# Patient Record
Sex: MALE | Race: WHITE | NOT HISPANIC OR LATINO | Employment: OTHER | URBAN - METROPOLITAN AREA
[De-identification: names, ages, dates, MRNs, and addresses within clinical notes are randomized per-mention and may not be internally consistent; named-entity substitution may affect disease eponyms.]

---

## 2017-07-25 DIAGNOSIS — D72.819 LEUKOPENIA, UNSPECIFIED TYPE: ICD-10-CM

## 2017-07-25 DIAGNOSIS — D50.9 IRON DEFICIENCY ANEMIA, UNSPECIFIED: ICD-10-CM

## 2017-07-25 DIAGNOSIS — D61.818 PANCYTOPENIA: ICD-10-CM

## 2017-07-25 DIAGNOSIS — D69.59 OTHER SECONDARY THROMBOCYTOPENIA: ICD-10-CM

## 2017-07-25 RX ORDER — FERROUS GLUCONATE 324(38)MG
324 TABLET ORAL 2 TIMES DAILY
Qty: 60 TABLET | Refills: 2 | COMMUNITY
Start: 2017-07-25 | End: 2019-09-09 | Stop reason: SDUPTHER

## 2018-04-23 ENCOUNTER — HISTORICAL (OUTPATIENT)
Dept: ADMINISTRATIVE | Facility: HOSPITAL | Age: 58
End: 2018-04-23

## 2018-04-23 ENCOUNTER — OFFICE VISIT (OUTPATIENT)
Dept: HEMATOLOGY/ONCOLOGY | Facility: CLINIC | Age: 58
End: 2018-04-23
Payer: MEDICARE

## 2018-04-23 VITALS
SYSTOLIC BLOOD PRESSURE: 129 MMHG | BODY MASS INDEX: 29.17 KG/M2 | DIASTOLIC BLOOD PRESSURE: 71 MMHG | WEIGHT: 181.5 LBS | HEART RATE: 74 BPM | HEIGHT: 66 IN | TEMPERATURE: 97 F

## 2018-04-23 DIAGNOSIS — D50.9 IRON DEFICIENCY ANEMIA, UNSPECIFIED IRON DEFICIENCY ANEMIA TYPE: Primary | ICD-10-CM

## 2018-04-23 DIAGNOSIS — D61.818 PANCYTOPENIA: ICD-10-CM

## 2018-04-23 DIAGNOSIS — D69.59 OTHER SECONDARY THROMBOCYTOPENIA: ICD-10-CM

## 2018-04-23 LAB
% SATURATION: 13 %
ALBUMIN SERPL-MCNC: 3.7 G/DL (ref 3.1–4.7)
ALP SERPL-CCNC: 85 IU/L (ref 40–104)
ALT (SGPT): 29 IU/L (ref 3–33)
AST SERPL-CCNC: 29 IU/L (ref 10–40)
BASOPHILS NFR BLD: 0.1 K/UL (ref 0–0.2)
BASOPHILS NFR BLD: 0.6 %
BILIRUB SERPL-MCNC: 0.3 MG/DL (ref 0.3–1)
BUN SERPL-MCNC: 15 MG/DL (ref 8–20)
CALCIUM SERPL-MCNC: 9.1 MG/DL (ref 7.7–10.4)
CHLORIDE: 100 MMOL/L (ref 98–110)
CO2 SERPL-SCNC: 24.5 MMOL/L (ref 22.8–31.6)
CREATININE: 1.12 MG/DL (ref 0.6–1.4)
EOSINOPHIL NFR BLD: 0.1 K/UL (ref 0–0.7)
EOSINOPHIL NFR BLD: 0.7 %
ERYTHROCYTE [DISTWIDTH] IN BLOOD BY AUTOMATED COUNT: 13.2 % (ref 12.5–14.5)
FERRITIN SERPL-MCNC: 33 NG/ML (ref 37–201)
GLUCOSE: 182 MG/DL (ref 70–99)
GRAN #: 6.6 K/UL (ref 1.4–6.5)
GRAN%: 78.2 %
HCT VFR BLD AUTO: 38.8 % (ref 39–55)
HGB BLD-MCNC: 13.2 G/DL (ref 14–16)
IMMATURE GRANS (ABS): 0 K/UL (ref 0–1)
IMMATURE GRANULOCYTES: 0.4 %
IRON: 43 MCG/DL (ref 32–176)
LYMPH #: 1.2 K/UL (ref 1.2–3.4)
LYMPH%: 13.7 %
MCH RBC QN AUTO: 29.9 PG (ref 25–35)
MCHC RBC AUTO-ENTMCNC: 34 G/DL (ref 31–36)
MCV RBC AUTO: 88 FL (ref 80–100)
MONO #: 0.5 K/UL (ref 0.1–0.6)
MONO%: 6.4 %
NUCLEATED RBCS: 0 %
NUCLEATED RED BLOOD CELLS: 0 /100 WBC
PERFORMED BY:: ABNORMAL
PLATELET # BLD AUTO: 187 K/UL (ref 140–440)
PMV BLD AUTO: 9.7 FL (ref 8.8–12.7)
POTASSIUM SERPL-SCNC: 4.3 MMOL/L (ref 3.5–5)
PROT SERPL-MCNC: 7 G/DL (ref 6–8.2)
RBC # BLD AUTO: 4.41 M/UL (ref 4.3–5.9)
SODIUM: 134 MMOL/L (ref 134–144)
TOTAL IRON BINDING CAPACITY: 321 MCG/DL (ref 177–435)
WBC # BLD: 8.5 K/UL (ref 5–10)

## 2018-04-23 PROCEDURE — 99213 OFFICE O/P EST LOW 20 MIN: CPT | Mod: ,,, | Performed by: INTERNAL MEDICINE

## 2018-04-23 RX ORDER — INSULIN GLARGINE 300 U/ML
INJECTION, SOLUTION SUBCUTANEOUS
Refills: 5 | COMMUNITY
Start: 2018-02-07 | End: 2019-11-08 | Stop reason: SDUPTHER

## 2018-04-23 RX ORDER — INSULIN DEGLUDEC 100 U/ML
INJECTION, SOLUTION SUBCUTANEOUS
Refills: 2 | COMMUNITY
Start: 2018-03-07 | End: 2019-11-08 | Stop reason: SDUPTHER

## 2018-04-23 RX ORDER — BLOOD SUGAR DIAGNOSTIC
STRIP MISCELLANEOUS
Refills: 5 | COMMUNITY
Start: 2018-03-19 | End: 2020-05-12 | Stop reason: SDUPTHER

## 2018-04-23 RX ORDER — LANCETS 33 GAUGE
EACH MISCELLANEOUS
Refills: 5 | COMMUNITY
Start: 2018-02-02 | End: 2020-01-08 | Stop reason: SDUPTHER

## 2018-04-23 RX ORDER — BLOOD-GLUCOSE METER
EACH MISCELLANEOUS
Refills: 0 | COMMUNITY
Start: 2018-03-31

## 2018-04-23 NOTE — PROGRESS NOTES
"   [facility] Hematology/Oncology  PROGRESS NOTE      Subjective:       Patient ID:   NAME: Matthieu Jovel : 1960     58 y.o. male    Referring Doc: Efren  Other Physicians:    Chief Complaint:  Iron defic anemia f/u    History of Present Illness:     Patient returns today for a regularly scheduled follow-up visit.  The patient is here with his transport person. He is resident of [facility]. He has some genralized aches and pains which I asked him to address with his mPCP; no SOB, HA's or N/V.             ROS:   GEN: normal without any fever, night sweats or weight loss  HEENT: normal with no HA's, sore throat, stiff neck, changes in vision  CV: normal with no CP, SOB, PND, PRADO or orthopnea  PULM: normal with no SOB, cough, hemoptysis, sputum or pleuritic pain  GI: normal with no abdominal pain, nausea, vomiting, constipation, diarrhea, melanotic stools, BRBPR, or hematemesis  : normal with no hematuria, dysuria  BREAST: normal with no mass, discharge, pain  SKIN: normal with no rash, erythema, bruising, or swelling    Allergies:  Review of patient's allergies indicates:   Allergen Reactions    Codeine        Medications:    Current Outpatient Prescriptions:     ascorbic acid (VITAMIN C) 500 MG tablet, Take 500 mg by mouth once daily., Disp: , Rfl:     BD INSULIN PEN NEEDLE UF MINI 31 gauge x 3/16" Ndle, USE TWICE DAILY AS DIRECTED, Disp: , Rfl: 4    benztropine (COGENTIN) 2 MG Tab, Take 1 mg by mouth 2 (two) times daily., Disp: , Rfl:     bisacodyl (GENTLE LAXATIVE) 5 mg EC tablet, Take 5 mg by mouth daily as needed., Disp: , Rfl:     CONTOUR METER Misc, UTD, Disp: , Rfl: 0    CONTOUR TEST STRIPS Strp, TEST QID PRN, Disp: , Rfl: 5    ferrous gluconate (FERGON) 324 MG tablet, Take 1 tablet (324 mg total) by mouth 2 (two) times daily., Disp: 60 tablet, Rfl: 2    fluoxetine (PROZAC) 10 MG Tab, Take 10 mg by mouth once daily., Disp: , Rfl:     gabapentin (NEURONTIN) 300 MG capsule, TK ONE C PO  " "BID, Disp: , Rfl: 1    hydrOXYzine (VISTARIL) 100 MG capsule, TK ONE C PO  QHS, Disp: , Rfl: 0    metformin (GLUCOPHAGE) 1000 MG tablet, Take 1,000 mg by mouth 2 (two) times daily with meals., Disp: , Rfl:     omeprazole (PRILOSEC) 20 MG capsule, TK ONE C PO  QD, Disp: , Rfl: 3    oxcarbazepine (TRILEPTAL) 300 MG Tab, TK 1 T PO  QD, Disp: , Rfl: 1    potassium chloride (KLOR-CON) 20 mEq Pack, Take 20 mEq by mouth once., Disp: , Rfl:     pravastatin (PRAVACHOL) 40 MG tablet, TK 1 T PO  QD, Disp: , Rfl: 5    quetiapine (SEROQUEL) 300 MG Tab, Take by mouth., Disp: , Rfl:     TOUJEO SOLOSTAR U-300 INSULIN 300 unit/mL (1.5 mL) InPn pen, INJECT 30 UNITS SQ D, Disp: , Rfl: 5    TRESIBA FLEXTOUCH U-100 100 unit/mL (3 mL) InPn, INJECT 30 UNITS UNDER THE SKIN ONCE DAILY, Disp: , Rfl: 2    TRUEPLUS LANCETS 33 gauge Misc, USE AS DIRECTED QID, Disp: , Rfl: 5    divalproex (DEPAKOTE) 500 MG Tb24, Take 500 mg by mouth once daily., Disp: , Rfl:     LATUDA 80 mg Tab tablet, TK 1 T PO BID, Disp: , Rfl: 1    PMHx/PSHx Updates:  See patient's last visit with me on 9/28/2016.  See H&P on 8/26/2013        Pathology:  none          Objective:     Vitals:  Blood pressure 129/71, pulse 74, temperature 97.4 °F (36.3 °C), height 5' 6" (1.676 m), weight 82.3 kg (181 lb 8 oz).    Physical Examination:   GEN: no apparent distress, comfortable; AAOx3  HEAD: atraumatic and normocephalic  EYES: no pallor, no icterus, PERRLA  ENT: OMM, no pharyngeal erythema, external ears WNL; no nasal discharge; no thrush  NECK: no masses, thyroid normal, trachea midline, no LAD/LN's, supple  CV: RRR with no murmur; normal pulse; normal S1 and S2; no pedal edema  CHEST: Normal respiratory effort; CTAB; normal breath sounds; no wheeze or crackles  ABDOM: nontender and nondistended; soft; normal bowel sounds; no rebound/guarding  MUSC/Skeletal: ROM normal; no crepitus; joints normal; no deformities or arthropathy  EXTREM: no clubbing, cyanosis, " inflammation or swelling  SKIN: no rashes, lesions, ulcers, petechiae or subcutaneous nodules  : no patel  NEURO: grossly intact; motor/sensory WNL; AAOx3; no tremors  PSYCH: normal mood, affect and behavior  LYMPH: normal cervical, supraclavicular, axillary and groin LN's            Labs:     Done with Dr Schwartz      Radiology/Diagnostic Studies:    No results found.    I have reviewed all available lab results and radiology reports.    Assessment/Plan:   (1) 58 y.o. male with diagnosis of psychiatric and mental issues who is a resident of [facility]      (2) mild chronic pancytopenia suspected secondary to antipsychotic meds  - he has not had any recent labs done    (3) iron defic anemia in past    (4) Noncompliance with follow-up        PLAN:  1. Check up to date labs incl. Iron panel  2. F/u with PCP  3. encouraged compliance  4.  RTC in 6 months  Fax note to Peter Schwartz MD,         Discussion:     I have explained all of the above in detail and the patient understands all of the current recommendation(s). I have answered all of their questions to the best of my ability and to their complete satisfaction.   The patient is to continue with the current management plan.            Electronically signed by Wing Arechiga MD

## 2018-04-23 NOTE — LETTER
April 23, 2018      Peter Schwartz MD  [address]  [address]  [place]  [place] [address]           [facility] Hematology Oncology  [address]  [address]  [place] [address]  Phone: [phone]  Fax: [phone]          Patient: Matthieu Jovel   MR Number: [id number]   YOB: 1960   Date of Visit: 4/23/2018       Dear Dr. Peter Schwartz:    Thank you for referring Matthieu Jovel to me for evaluation. Attached you will find relevant portions of my assessment and plan of care.    If you have questions, please do not hesitate to call me. I look forward to following Matthieu Jovel along with you.    Sincerely,    Wing Arechiga MD    Enclosure  CC:  No Recipients    If you would like to receive this communication electronically, please contact [email or web address] or [phone] to request more information on [employer] Link access.    For providers and/or their staff who would like to refer a patient to [facility], please contact us through our one-stop-shop provider referral line, [facility], at [phone].    If you feel you have received this communication in error or would no longer like to receive these types of communications, please e-mail [email or web address]

## 2018-09-24 LAB — HBA1C MFR BLD: 8.8 %

## 2018-10-18 ENCOUNTER — TELEPHONE (OUTPATIENT)
Dept: HEMATOLOGY/ONCOLOGY | Facility: CLINIC | Age: 58
End: 2018-10-18

## 2018-10-18 NOTE — TELEPHONE ENCOUNTER
Called to remind the pt to have the lab work done prior to the upcoming appt.    Pt ask that I call back tomorrow to remind his  Mireille.    Assured the pt that I will call back tomorrow

## 2018-10-19 NOTE — TELEPHONE ENCOUNTER
Called to speak w/ Mireille as the pt requested, the  said she wasn't in. I let the home know that the pt has a appt this Monday and we would like the pt to have his bloodwork done prior to the appt.  She will relay the message and try and have the labs done.

## 2018-10-22 ENCOUNTER — OFFICE VISIT (OUTPATIENT)
Dept: HEMATOLOGY/ONCOLOGY | Facility: CLINIC | Age: 58
End: 2018-10-22
Payer: MEDICARE

## 2018-10-22 VITALS
DIASTOLIC BLOOD PRESSURE: 70 MMHG | WEIGHT: 183.13 LBS | HEART RATE: 79 BPM | SYSTOLIC BLOOD PRESSURE: 143 MMHG | BODY MASS INDEX: 29.55 KG/M2 | TEMPERATURE: 98 F | RESPIRATION RATE: 20 BRPM

## 2018-10-22 DIAGNOSIS — D72.819 LEUKOPENIA, UNSPECIFIED TYPE: ICD-10-CM

## 2018-10-22 DIAGNOSIS — D69.59 OTHER SECONDARY THROMBOCYTOPENIA: Primary | ICD-10-CM

## 2018-10-22 DIAGNOSIS — D61.818 PANCYTOPENIA: ICD-10-CM

## 2018-10-22 DIAGNOSIS — D50.9 IRON DEFICIENCY ANEMIA, UNSPECIFIED IRON DEFICIENCY ANEMIA TYPE: ICD-10-CM

## 2018-10-22 LAB
% SATURATION: 8 %
ALBUMIN SERPL-MCNC: 3.7 G/DL (ref 3.1–4.7)
ALP SERPL-CCNC: 117 IU/L (ref 40–104)
ALT (SGPT): 18 IU/L (ref 3–33)
AST SERPL-CCNC: 17 IU/L (ref 10–40)
BASOPHILS NFR BLD: 0 K/UL (ref 0–0.2)
BASOPHILS NFR BLD: 0.6 %
BILIRUB SERPL-MCNC: 0.6 MG/DL (ref 0.3–1)
BUN SERPL-MCNC: 15 MG/DL (ref 8–20)
CALCIUM SERPL-MCNC: 9.1 MG/DL (ref 7.7–10.4)
CHLORIDE: 105 MMOL/L (ref 98–110)
CO2 SERPL-SCNC: 22.5 MMOL/L (ref 22.8–31.6)
CREATININE: 1.13 MG/DL (ref 0.6–1.4)
EOSINOPHIL NFR BLD: 0.1 K/UL (ref 0–0.7)
EOSINOPHIL NFR BLD: 1.2 %
ERYTHROCYTE [DISTWIDTH] IN BLOOD BY AUTOMATED COUNT: 13.1 % (ref 12.5–14.5)
FERRITIN SERPL-MCNC: 65 NG/ML (ref 37–201)
GLUCOSE: 356 MG/DL (ref 70–99)
GRAN #: 5.3 K/UL (ref 1.4–6.5)
GRAN%: 79.7 %
HCT VFR BLD AUTO: 39.9 % (ref 39–55)
HGB BLD-MCNC: 13.2 G/DL (ref 14–16)
IMMATURE GRANS (ABS): 0 K/UL (ref 0–1)
IMMATURE GRANULOCYTES: 0.5 %
IRON: 25 MCG/DL (ref 32–176)
LYMPH #: 0.8 K/UL (ref 1.2–3.4)
LYMPH%: 12.4 %
MCH RBC QN AUTO: 29.7 PG (ref 25–35)
MCHC RBC AUTO-ENTMCNC: 33.1 G/DL (ref 31–36)
MCV RBC AUTO: 89.9 FL (ref 80–100)
MONO #: 0.4 K/UL (ref 0.1–0.6)
MONO%: 5.6 %
NUCLEATED RBCS: 0 %
NUCLEATED RED BLOOD CELLS: 0 /100 WBC
PERFORMED BY:: ABNORMAL
PLATELET # BLD AUTO: 120 K/UL (ref 140–440)
PMV BLD AUTO: 11 FL (ref 8.8–12.7)
POTASSIUM SERPL-SCNC: 4.5 MMOL/L (ref 3.5–5)
PROT SERPL-MCNC: 7 G/DL (ref 6–8.2)
RBC # BLD AUTO: 4.44 M/UL (ref 4.3–5.9)
SODIUM: 137 MMOL/L (ref 134–144)
TOTAL IRON BINDING CAPACITY: 296 MCG/DL (ref 177–435)
WBC # BLD: 6.6 K/UL (ref 5–10)

## 2018-10-22 PROCEDURE — 99213 OFFICE O/P EST LOW 20 MIN: CPT | Mod: ,,, | Performed by: INTERNAL MEDICINE

## 2018-10-22 NOTE — PROGRESS NOTES
"   [facility] Hematology/Oncology  PROGRESS NOTE      Subjective:       Patient ID:   NAME: Matthieu Jovel : 1960     58 y.o. male    Referring Doc: Efren  Other Physicians:    Chief Complaint:  Iron defic anemia f/u    History of Present Illness:     Patient returns today for a regularly scheduled follow-up visit.  The patient is here with his transport person. He is resident of [facility]. He went to ER at [facility] recently because his catheter bag was clogged; otherwise he denies any new issues; no SOB, HA's or N/V.             ROS:   GEN: normal without any fever, night sweats or weight loss  HEENT: normal with no HA's, sore throat, stiff neck, changes in vision  CV: normal with no CP, SOB, PND, PRADO or orthopnea  PULM: normal with no SOB, cough, hemoptysis, sputum or pleuritic pain  GI: normal with no abdominal pain, nausea, vomiting, constipation, diarrhea, melanotic stools, BRBPR, or hematemesis  : normal with no hematuria, dysuria  BREAST: normal with no mass, discharge, pain  SKIN: normal with no rash, erythema, bruising, or swelling    Allergies:  Review of patient's allergies indicates:   Allergen Reactions    Codeine        Medications:    Current Outpatient Medications:     ascorbic acid (VITAMIN C) 500 MG tablet, Take 500 mg by mouth once daily., Disp: , Rfl:     BD INSULIN PEN NEEDLE UF MINI 31 gauge x 3/16" Ndle, USE TWICE DAILY AS DIRECTED, Disp: , Rfl: 4    benztropine (COGENTIN) 2 MG Tab, Take 1 mg by mouth 2 (two) times daily., Disp: , Rfl:     bisacodyl (GENTLE LAXATIVE) 5 mg EC tablet, Take 5 mg by mouth daily as needed., Disp: , Rfl:     CONTOUR METER Misc, UTD, Disp: , Rfl: 0    CONTOUR TEST STRIPS Strp, TEST QID PRN, Disp: , Rfl: 5    divalproex (DEPAKOTE) 500 MG Tb24, Take 500 mg by mouth once daily., Disp: , Rfl:     ferrous gluconate (FERGON) 324 MG tablet, Take 1 tablet (324 mg total) by mouth 2 (two) times daily., Disp: 60 tablet, Rfl: 2    fluoxetine (PROZAC) 10 MG " Tab, Take 10 mg by mouth once daily., Disp: , Rfl:     gabapentin (NEURONTIN) 300 MG capsule, TK ONE C PO  BID, Disp: , Rfl: 1    hydrOXYzine (VISTARIL) 100 MG capsule, TK ONE C PO  QHS, Disp: , Rfl: 0    metformin (GLUCOPHAGE) 1000 MG tablet, Take 1,000 mg by mouth 2 (two) times daily with meals., Disp: , Rfl:     omeprazole (PRILOSEC) 20 MG capsule, TK ONE C PO  QD, Disp: , Rfl: 3    oxcarbazepine (TRILEPTAL) 300 MG Tab, TK 1 T PO  QD, Disp: , Rfl: 1    potassium chloride (KLOR-CON) 20 mEq Pack, Take 20 mEq by mouth once., Disp: , Rfl:     pravastatin (PRAVACHOL) 40 MG tablet, TK 1 T PO  QD, Disp: , Rfl: 5    quetiapine (SEROQUEL) 300 MG Tab, Take by mouth., Disp: , Rfl:     TOUJEO SOLOSTAR U-300 INSULIN 300 unit/mL (1.5 mL) InPn pen, INJECT 30 UNITS SQ D, Disp: , Rfl: 5    TRESIBA FLEXTOUCH U-100 100 unit/mL (3 mL) InPn, INJECT 30 UNITS UNDER THE SKIN ONCE DAILY, Disp: , Rfl: 2    TRUEPLUS LANCETS 33 gauge Misc, USE AS DIRECTED QID, Disp: , Rfl: 5    LATUDA 80 mg Tab tablet, TK 1 T PO BID, Disp: , Rfl: 1    PMHx/PSHx Updates:  See patient's last visit with me on 4/23/2018  See H&P on 8/26/2013        Pathology:  none          Objective:     Vitals:  Blood pressure (!) 143/70, pulse 79, temperature 98 °F (36.7 °C), resp. rate 20, weight 83.1 kg (183 lb 1.6 oz).    Physical Examination:   GEN: no apparent distress, comfortable; AAOx3  HEAD: atraumatic and normocephalic  EYES: no pallor, no icterus, PERRLA  ENT: OMM, no pharyngeal erythema, external ears WNL; no nasal discharge; no thrush  NECK: no masses, thyroid normal, trachea midline, no LAD/LN's, supple  CV: RRR with no murmur; normal pulse; normal S1 and S2; no pedal edema  CHEST: Normal respiratory effort; CTAB; normal breath sounds; no wheeze or crackles  ABDOM: nontender and nondistended; soft; normal bowel sounds; no rebound/guarding  MUSC/Skeletal: ROM normal; no crepitus; joints normal; no deformities or arthropathy  EXTREM: no clubbing,  cyanosis, inflammation or swelling  SKIN: no rashes, lesions, ulcers, petechiae or subcutaneous nodules  : no patel  NEURO: grossly intact; motor/sensory WNL; AAOx3; no tremors  PSYCH: normal mood, affect and behavior  LYMPH: normal cervical, supraclavicular, axillary and groin LN's            Labs:     10/22/2018 pending      Radiology/Diagnostic Studies:    No results found.    I have reviewed all available lab results and radiology reports.    Assessment/Plan:   (1) 58 y.o. male with diagnosis of psychiatric and mental issues who is a resident of [facility]      (2) mild chronic pancytopenia suspected secondary to antipsychotic meds  - he has not had any recent labs done until today and they are pending    (3) iron defic anemia in past    (4) Noncompliance with follow-up and labs    1. Other secondary thrombocytopenia  CBC auto differential    Comprehensive metabolic panel    Iron and TIBC    Ferritin    CBC auto differential    Comprehensive metabolic panel    Iron and TIBC    Ferritin   2. Pancytopenia  CBC auto differential    Comprehensive metabolic panel    Iron and TIBC    Ferritin    CBC auto differential    Comprehensive metabolic panel    Iron and TIBC    Ferritin   3. Iron deficiency anemia, unspecified iron deficiency anemia type  CBC auto differential    Comprehensive metabolic panel    Iron and TIBC    Ferritin    CBC auto differential    Comprehensive metabolic panel    Iron and TIBC    Ferritin   4. Leukopenia, unspecified type  CBC auto differential    Comprehensive metabolic panel    Iron and TIBC    Ferritin    CBC auto differential    Comprehensive metabolic panel    Iron and TIBC    Ferritin         PLAN:  1. Check up to date labs incl. Iron panel that were drawn today  2. F/u with PCP  3. encouraged compliance  4.  RTC in 6 months  Fax note to Efren        Discussion:     I have explained all of the above in detail and the patient understands all of the current recommendation(s). I  have answered all of their questions to the best of my ability and to their complete satisfaction.   The patient is to continue with the current management plan.            Electronically signed by Wing Arechiga MD

## 2018-10-22 NOTE — LETTER
October 22, 2018      Peter Schwartz MD  [address]  [address]  [place]  [place] [address]           [facility] - Hematology Oncology  [address]  [address]  [place] [address]  Phone: [phone]  Fax: [phone]          Patient: Matthieu Jovel   MR Number: [id number]   YOB: 1960   Date of Visit: 10/22/2018       Dear Dr. Peter Schwartz:    Thank you for referring Matthieu Jovel to me for evaluation. Attached you will find relevant portions of my assessment and plan of care.    If you have questions, please do not hesitate to call me. I look forward to following Matthieu Jovel along with you.    Sincerely,    Wing Arechiga MD    Enclosure  CC:  No Recipients    If you would like to receive this communication electronically, please contact [email or web address] or [phone] to request more information on [employer] access.    For providers and/or their staff who would like to refer a patient to [facility], please contact us through our one-stop-shop provider referral line, [facility], at [phone].    If you feel you have received this communication in error or would no longer like to receive these types of communications, please e-mail [email or web address]

## 2019-04-17 ENCOUNTER — TELEPHONE (OUTPATIENT)
Dept: HEMATOLOGY/ONCOLOGY | Facility: CLINIC | Age: 59
End: 2019-04-17

## 2019-04-17 DIAGNOSIS — D72.819 LEUKOPENIA, UNSPECIFIED TYPE: ICD-10-CM

## 2019-04-17 DIAGNOSIS — D50.9 IRON DEFICIENCY ANEMIA, UNSPECIFIED IRON DEFICIENCY ANEMIA TYPE: Primary | ICD-10-CM

## 2019-04-17 DIAGNOSIS — D61.818 PANCYTOPENIA: ICD-10-CM

## 2019-04-17 DIAGNOSIS — D69.59 OTHER SECONDARY THROMBOCYTOPENIA: ICD-10-CM

## 2019-04-17 NOTE — TELEPHONE ENCOUNTER
Called to see if the pt had any labs done prior to f/u appt.    Spoke w/ Mireille @ the Group home and let her know about the standing orders to be done @ [facility].  She will have the labs done @ [facility] in [place].

## 2019-04-20 LAB
ALBUMIN SERPL-MCNC: 4.6 G/DL (ref 3.6–5.1)
ALBUMIN/GLOB SERPL: 1.6 (CALC) (ref 1–2.5)
ALP SERPL-CCNC: 114 U/L (ref 40–115)
ALT SERPL-CCNC: 14 U/L (ref 9–46)
AST SERPL-CCNC: 16 U/L (ref 10–35)
BASOPHILS # BLD AUTO: 52 CELLS/UL (ref 0–200)
BASOPHILS NFR BLD AUTO: 0.9 %
BILIRUB SERPL-MCNC: 0.5 MG/DL (ref 0.2–1.2)
BUN SERPL-MCNC: 19 MG/DL (ref 7–25)
BUN/CREAT SERPL: ABNORMAL (CALC) (ref 6–22)
CALCIUM SERPL-MCNC: 9.4 MG/DL (ref 8.6–10.3)
CHLORIDE SERPL-SCNC: 99 MMOL/L (ref 98–110)
CO2 SERPL-SCNC: 29 MMOL/L (ref 20–32)
CREAT SERPL-MCNC: 1.25 MG/DL (ref 0.7–1.33)
EOSINOPHIL # BLD AUTO: 99 CELLS/UL (ref 15–500)
EOSINOPHIL NFR BLD AUTO: 1.7 %
ERYTHROCYTE [DISTWIDTH] IN BLOOD BY AUTOMATED COUNT: 14.3 % (ref 11–15)
FERRITIN SERPL-MCNC: 43 NG/ML (ref 20–380)
GFRSERPLBLD MDRD-ARVRAT: 63 ML/MIN/1.73M2
GLOBULIN SER CALC-MCNC: 2.9 G/DL (CALC) (ref 1.9–3.7)
GLUCOSE SERPL-MCNC: 130 MG/DL (ref 65–99)
HCT VFR BLD AUTO: 41.4 % (ref 38.5–50)
HGB BLD-MCNC: 14.1 G/DL (ref 13.2–17.1)
IRON SATN MFR SERPL: 18 % (CALC) (ref 15–60)
IRON SERPL-MCNC: 64 MCG/DL (ref 50–180)
LYMPHOCYTES # BLD AUTO: 1015 CELLS/UL (ref 850–3900)
LYMPHOCYTES NFR BLD AUTO: 17.5 %
MCH RBC QN AUTO: 29.9 PG (ref 27–33)
MCHC RBC AUTO-ENTMCNC: 34.1 G/DL (ref 32–36)
MCV RBC AUTO: 87.7 FL (ref 80–100)
MONOCYTES # BLD AUTO: 360 CELLS/UL (ref 200–950)
MONOCYTES NFR BLD AUTO: 6.2 %
NEUTROPHILS # BLD AUTO: 4275 CELLS/UL (ref 1500–7800)
NEUTROPHILS NFR BLD AUTO: 73.7 %
PLATELET # BLD AUTO: 203 THOUSAND/UL (ref 140–400)
PMV BLD REES-ECKER: 10.6 FL (ref 7.5–12.5)
POTASSIUM SERPL-SCNC: 4.4 MMOL/L (ref 3.5–5.3)
PROT SERPL-MCNC: 7.5 G/DL (ref 6.1–8.1)
RBC # BLD AUTO: 4.72 MILLION/UL (ref 4.2–5.8)
SODIUM SERPL-SCNC: 135 MMOL/L (ref 135–146)
TIBC SERPL-MCNC: 352 MCG/DL (CALC) (ref 250–425)
WBC # BLD AUTO: 5.8 THOUSAND/UL (ref 3.8–10.8)

## 2019-04-21 NOTE — PROGRESS NOTES
"   [facility] Hematology/Oncology  PROGRESS NOTE      Subjective:       Patient ID:   NAME: Matthieu Jovel : 1960     59 y.o. male    Referring Doc: Efren  Other Physicians:    Chief Complaint:  Iron defic anemia f/u    History of Present Illness:     Patient returns today for a regularly scheduled follow-up visit.  The patient is here with his transport person. He is resident of [facility]. He denies any new issues; no SOB, HA's or N/V. He has been busy with the holidays.             ROS:   GEN: normal without any fever, night sweats or weight loss  HEENT: normal with no HA's, sore throat, stiff neck, changes in vision  CV: normal with no CP, SOB, PND, PRADO or orthopnea  PULM: normal with no SOB, cough, hemoptysis, sputum or pleuritic pain  GI: normal with no abdominal pain, nausea, vomiting, constipation, diarrhea, melanotic stools, BRBPR, or hematemesis  : normal with no hematuria, dysuria  BREAST: normal with no mass, discharge, pain  SKIN: normal with no rash, erythema, bruising, or swelling    Allergies:  Review of patient's allergies indicates:   Allergen Reactions    Codeine        Medications:    Current Outpatient Medications:     ascorbic acid (VITAMIN C) 500 MG tablet, Take 500 mg by mouth once daily., Disp: , Rfl:     BD INSULIN PEN NEEDLE UF MINI 31 gauge x 3/16" Ndle, USE TWICE DAILY AS DIRECTED, Disp: , Rfl: 4    benztropine (COGENTIN) 2 MG Tab, Take 1 mg by mouth 2 (two) times daily., Disp: , Rfl:     bisacodyl (GENTLE LAXATIVE) 5 mg EC tablet, Take 5 mg by mouth daily as needed., Disp: , Rfl:     CONTOUR METER Misc, UTD, Disp: , Rfl: 0    CONTOUR TEST STRIPS Strp, TEST QID PRN, Disp: , Rfl: 5    divalproex (DEPAKOTE) 500 MG Tb24, Take 500 mg by mouth once daily., Disp: , Rfl:     ferrous gluconate (FERGON) 324 MG tablet, Take 1 tablet (324 mg total) by mouth 2 (two) times daily., Disp: 60 tablet, Rfl: 2    fluoxetine (PROZAC) 10 MG Tab, Take 10 mg by mouth once daily., Disp: " , Rfl:     gabapentin (NEURONTIN) 300 MG capsule, TK ONE C PO  BID, Disp: , Rfl: 1    hydrOXYzine (VISTARIL) 100 MG capsule, TK ONE C PO  QHS, Disp: , Rfl: 0    ibuprofen (ADVIL,MOTRIN) 600 MG tablet, Take 1 tablet (600 mg total) by mouth every 6 (six) hours as needed for Pain., Disp: 20 tablet, Rfl: 0    LATUDA 80 mg Tab tablet, TK 1 T PO BID, Disp: , Rfl: 1    metformin (GLUCOPHAGE) 1000 MG tablet, Take 1,000 mg by mouth 2 (two) times daily with meals., Disp: , Rfl:     OLANZapine (ZYPREXA) 10 MG tablet, Take 10 mg by mouth every evening., Disp: , Rfl:     omeprazole (PRILOSEC) 20 MG capsule, TK ONE C PO  QD, Disp: , Rfl: 3    oxcarbazepine (TRILEPTAL) 300 MG Tab, TK 1 T PO  QD, Disp: , Rfl: 1    potassium chloride (KLOR-CON) 20 mEq Pack, Take 20 mEq by mouth once., Disp: , Rfl:     pravastatin (PRAVACHOL) 40 MG tablet, TK 1 T PO  QD, Disp: , Rfl: 5    quetiapine (SEROQUEL) 300 MG Tab, Take by mouth., Disp: , Rfl:     TOUJEO SOLOSTAR U-300 INSULIN 300 unit/mL (1.5 mL) InPn pen, INJECT 30 UNITS SQ D, Disp: , Rfl: 5    TRESIBA FLEXTOUCH U-100 100 unit/mL (3 mL) InPn, INJECT 30 UNITS UNDER THE SKIN ONCE DAILY, Disp: , Rfl: 2    TRUEPLUS LANCETS 33 gauge Misc, USE AS DIRECTED QID, Disp: , Rfl: 5    PMHx/PSHx Updates:  See patient's last visit with me on 10/22/2018  See H&P on 8/26/2013        Pathology:  none          Objective:     Vitals:  Blood pressure 127/77, pulse 78, temperature 98 °F (36.7 °C), temperature source Oral, resp. rate 20, weight 85.4 kg (188 lb 3.2 oz).    Physical Examination:   GEN: no apparent distress, comfortable; AAOx3  HEAD: atraumatic and normocephalic  EYES: no pallor, no icterus, PERRLA  ENT: OMM, no pharyngeal erythema, external ears WNL; no nasal discharge; no thrush  NECK: no masses, thyroid normal, trachea midline, no LAD/LN's, supple  CV: RRR with no murmur; normal pulse; normal S1 and S2; no pedal edema  CHEST: Normal respiratory effort; CTAB; normal breath sounds; no  wheeze or crackles  ABDOM: nontender and nondistended; soft; normal bowel sounds; no rebound/guarding  MUSC/Skeletal: ROM normal; no crepitus; joints normal; no deformities or arthropathy  EXTREM: no clubbing, cyanosis, inflammation or swelling  SKIN: no rashes, lesions, ulcers, petechiae or subcutaneous nodules  : no patel  NEURO: grossly intact; motor/sensory WNL; AAOx3; no tremors  PSYCH: normal mood, affect and behavior  LYMPH: normal cervical, supraclavicular, axillary and groin LN's            Labs:     4/19/2019  Lab Results   Component Value Date    WBC 5.8 04/19/2019    HGB 14.1 04/19/2019    HCT 41.4 04/19/2019    MCV 87.7 04/19/2019     04/19/2019     CMP  Sodium   Date Value Ref Range Status   04/19/2019 135 135 - 146 mmol/L Final   10/22/2018 137 134 - 144 mmol/L      Potassium   Date Value Ref Range Status   04/19/2019 4.4 3.5 - 5.3 mmol/L Final     Chloride   Date Value Ref Range Status   04/19/2019 99 98 - 110 mmol/L Final   10/22/2018 105 98 - 110 mmol/L      CO2   Date Value Ref Range Status   04/19/2019 29 20 - 32 mmol/L Final     Glucose   Date Value Ref Range Status   04/19/2019 130 (H) 65 - 99 mg/dL Final     Comment:                   Fasting reference interval     For someone without known diabetes, a glucose  value >125 mg/dL indicates that they may have  diabetes and this should be confirmed with a  follow-up test.        10/22/2018 356 (H) 70 - 99 mg/dL      BUN, Bld   Date Value Ref Range Status   04/19/2019 19 7 - 25 mg/dL Final     Creatinine   Date Value Ref Range Status   04/19/2019 1.25 0.70 - 1.33 mg/dL Final     Comment:     For patients >49 years of age, the reference limit  for Creatinine is approximately 13% higher for people  identified as -American.        10/22/2018 1.13 0.60 - 1.40 mg/dL      Calcium   Date Value Ref Range Status   04/19/2019 9.4 8.6 - 10.3 mg/dL Final     Total Protein   Date Value Ref Range Status   04/19/2019 7.5 6.1 - 8.1 g/dL Final      Albumin   Date Value Ref Range Status   04/19/2019 4.6 3.6 - 5.1 g/dL Final   10/22/2018 3.7 3.1 - 4.7 g/dL      Total Bilirubin   Date Value Ref Range Status   04/19/2019 0.5 0.2 - 1.2 mg/dL Final     Alkaline Phosphatase   Date Value Ref Range Status   04/19/2019 114 40 - 115 U/L Final     AST   Date Value Ref Range Status   04/19/2019 16 10 - 35 U/L Final     ALT   Date Value Ref Range Status   04/19/2019 14 9 - 46 U/L Final     eGFR if    Date Value Ref Range Status   04/19/2019 73 > OR = 60 mL/min/1.73m2 Final     eGFR if non    Date Value Ref Range Status   04/19/2019 63 > OR = 60 mL/min/1.73m2 Final     Lab Results   Component Value Date    IRON 64 04/19/2019    TIBC 352 04/19/2019    FERRITIN 43 04/19/2019           Radiology/Diagnostic Studies:    No results found.    I have reviewed all available lab results and radiology reports.    Assessment/Plan:   (1) 59 y.o. male with diagnosis of psychiatric and mental issues who is a resident of [facility]    (2) Mild chronic pancytopenia suspected secondary to antipsychotic meds  - he had some recent labs done and CBC is adequate  - hgb is WNL, WBC is WNL  - iron panel is adequate    (3) iron defic anemia in past    (4) Noncompliance with follow-up and labs    1. Other secondary thrombocytopenia  CBC auto differential    Comprehensive metabolic panel    Iron and TIBC    Ferritin   2. Pancytopenia  CBC auto differential    Comprehensive metabolic panel    Iron and TIBC    Ferritin   3. Iron deficiency anemia, unspecified iron deficiency anemia type  CBC auto differential    Comprehensive metabolic panel    Iron and TIBC    Ferritin   4. Leukopenia, unspecified type  CBC auto differential    Comprehensive metabolic panel    Iron and TIBC    Ferritin         PLAN:  1. Check up to date labs incl. Iron panel every 6 months  2. F/u with PCP  3. encouraged compliance  4.  RTC in 6 months  Fax note to Efren        Discussion:      I have explained all of the above in detail and the patient understands all of the current recommendation(s). I have answered all of their questions to the best of my ability and to their complete satisfaction.   The patient is to continue with the current management plan.            Electronically signed by Wing Arechiga MD

## 2019-04-22 ENCOUNTER — OFFICE VISIT (OUTPATIENT)
Dept: HEMATOLOGY/ONCOLOGY | Facility: CLINIC | Age: 59
End: 2019-04-22
Payer: MEDICARE

## 2019-04-22 VITALS
TEMPERATURE: 98 F | RESPIRATION RATE: 20 BRPM | SYSTOLIC BLOOD PRESSURE: 127 MMHG | DIASTOLIC BLOOD PRESSURE: 77 MMHG | HEART RATE: 78 BPM | BODY MASS INDEX: 30.38 KG/M2 | WEIGHT: 188.19 LBS

## 2019-04-22 DIAGNOSIS — D72.819 LEUKOPENIA, UNSPECIFIED TYPE: ICD-10-CM

## 2019-04-22 DIAGNOSIS — D61.818 PANCYTOPENIA: ICD-10-CM

## 2019-04-22 DIAGNOSIS — D69.59 OTHER SECONDARY THROMBOCYTOPENIA: Primary | ICD-10-CM

## 2019-04-22 DIAGNOSIS — D50.9 IRON DEFICIENCY ANEMIA, UNSPECIFIED IRON DEFICIENCY ANEMIA TYPE: ICD-10-CM

## 2019-04-22 PROCEDURE — 99213 OFFICE O/P EST LOW 20 MIN: CPT | Mod: ,,, | Performed by: INTERNAL MEDICINE

## 2019-04-22 PROCEDURE — 99213 PR OFFICE/OUTPT VISIT, EST, LEVL III, 20-29 MIN: ICD-10-PCS | Mod: ,,, | Performed by: INTERNAL MEDICINE

## 2019-04-22 RX ORDER — OLANZAPINE 10 MG/1
10 TABLET ORAL NIGHTLY
COMMUNITY
End: 2020-02-18

## 2019-04-22 NOTE — LETTER
April 22, 2019      Peter Schwartz MD  [address]  [address]  [facility]  [place] [address]           [facility] - Hematology Oncology  [address] [facility]  [address]  [place] [address]  Phone: [phone]  Fax: [phone]          Patient: Matthieu Jovel   MR Number: [id number]   YOB: 1960   Date of Visit: 4/22/2019       Dear Dr. Peter Schwartz:    Thank you for referring Matthieu Jovel to me for evaluation. Attached you will find relevant portions of my assessment and plan of care.    If you have questions, please do not hesitate to call me. I look forward to following Matthieu Jovel along with you.    Sincerely,    Wing Arechiga MD    Enclosure  CC:  No Recipients    If you would like to receive this communication electronically, please contact [email or web address] or [phone] to request more information on [employer] access.    For providers and/or their staff who would like to refer a patient to [facility], please contact us through our one-stop-shop provider referral line, [facility], at [phone].    If you feel you have received this communication in error or would no longer like to receive these types of communications, please e-mail [email or web address]

## 2019-09-09 DIAGNOSIS — D69.59 OTHER SECONDARY THROMBOCYTOPENIA: ICD-10-CM

## 2019-09-09 DIAGNOSIS — D50.9 IRON DEFICIENCY ANEMIA, UNSPECIFIED: ICD-10-CM

## 2019-09-09 DIAGNOSIS — D72.819 LEUKOPENIA, UNSPECIFIED TYPE: ICD-10-CM

## 2019-09-09 DIAGNOSIS — D61.818 PANCYTOPENIA: ICD-10-CM

## 2019-09-09 RX ORDER — FERROUS GLUCONATE 324(38)MG
324 TABLET ORAL 2 TIMES DAILY
Qty: 60 TABLET | Refills: 2 | COMMUNITY
Start: 2019-09-09 | End: 2020-02-05

## 2019-09-24 DIAGNOSIS — R11.0 NAUSEA: Primary | ICD-10-CM

## 2019-09-24 DIAGNOSIS — F20.9 SCHIZOPHRENIA, UNSPECIFIED TYPE: ICD-10-CM

## 2019-09-24 DIAGNOSIS — E11.69 TYPE 2 DIABETES MELLITUS WITH OTHER SPECIFIED COMPLICATION, UNSPECIFIED WHETHER LONG TERM INSULIN USE: ICD-10-CM

## 2019-09-24 RX ORDER — ONDANSETRON 4 MG/1
4 TABLET, FILM COATED ORAL DAILY
Qty: 30 TABLET | Refills: 2 | Status: SHIPPED | OUTPATIENT
Start: 2019-09-24 | End: 2019-10-09 | Stop reason: SDUPTHER

## 2019-09-24 RX ORDER — GABAPENTIN 300 MG/1
300 CAPSULE ORAL 2 TIMES DAILY
Qty: 180 CAPSULE | Refills: 1 | Status: SHIPPED | OUTPATIENT
Start: 2019-09-24 | End: 2019-10-09 | Stop reason: SDUPTHER

## 2019-09-24 RX ORDER — BENZTROPINE MESYLATE 1 MG/1
1 TABLET ORAL 2 TIMES DAILY
Qty: 180 TABLET | Refills: 2 | Status: SHIPPED | OUTPATIENT
Start: 2019-09-24 | End: 2019-10-09 | Stop reason: SDUPTHER

## 2019-09-24 RX ORDER — ONDANSETRON 4 MG/1
TABLET, FILM COATED ORAL
Refills: 2 | COMMUNITY
Start: 2019-08-26 | End: 2019-09-24 | Stop reason: SDUPTHER

## 2019-09-24 NOTE — TELEPHONE ENCOUNTER
----- Message from Nikos Zamudio sent at 9/24/2019 10:23 AM CDT -----  Contact: Mireille dimas's   Needs gabapentin 300 MG, Benztropine 1Mg, and Ondansetron 4Mg sent to [facility] in [place] .   #[phone]

## 2019-10-09 ENCOUNTER — OFFICE VISIT (OUTPATIENT)
Dept: FAMILY MEDICINE | Facility: CLINIC | Age: 59
End: 2019-10-09
Payer: MEDICARE

## 2019-10-09 DIAGNOSIS — E78.5 HYPERLIPIDEMIA, UNSPECIFIED HYPERLIPIDEMIA TYPE: ICD-10-CM

## 2019-10-09 DIAGNOSIS — F20.9 SCHIZOPHRENIA, UNSPECIFIED TYPE: ICD-10-CM

## 2019-10-09 DIAGNOSIS — E11.42 DM TYPE 2 WITH DIABETIC PERIPHERAL NEUROPATHY: Primary | ICD-10-CM

## 2019-10-09 DIAGNOSIS — K21.9 GASTROESOPHAGEAL REFLUX DISEASE, ESOPHAGITIS PRESENCE NOT SPECIFIED: ICD-10-CM

## 2019-10-09 DIAGNOSIS — N32.0 BLADDER OUTLET OBSTRUCTION: ICD-10-CM

## 2019-10-09 DIAGNOSIS — R11.0 NAUSEA: ICD-10-CM

## 2019-10-09 LAB — HBA1C MFR BLD: 12.1 %

## 2019-10-09 PROCEDURE — 99214 PR OFFICE/OUTPT VISIT, EST, LEVL IV, 30-39 MIN: ICD-10-PCS | Mod: S$GLB,,, | Performed by: FAMILY MEDICINE

## 2019-10-09 PROCEDURE — 99214 OFFICE O/P EST MOD 30 MIN: CPT | Mod: S$GLB,,, | Performed by: FAMILY MEDICINE

## 2019-10-09 PROCEDURE — 83036 POCT HEMOGLOBIN A1C: ICD-10-PCS | Mod: QW,,, | Performed by: FAMILY MEDICINE

## 2019-10-09 PROCEDURE — 83036 HEMOGLOBIN GLYCOSYLATED A1C: CPT | Mod: QW,,, | Performed by: FAMILY MEDICINE

## 2019-10-09 RX ORDER — METFORMIN HYDROCHLORIDE 500 MG/1
TABLET, EXTENDED RELEASE ORAL
COMMUNITY
End: 2019-10-09 | Stop reason: SDUPTHER

## 2019-10-09 NOTE — PROGRESS NOTES
SUBJECTIVE:    Patient ID: Matthieu Jovel is a 59 y.o. male.    Chief Complaint: Diabetes (check up); Schizophrenia; and Gastroesophageal Reflux    Pt here to checkup on chronic conditions.      Pt has been losing weight.  Pt has lost 13 pounds since last visit. Admits to eating 3 meals a day.  Snacking once day.  Drinking some water.  Drinks coke zero, about 3 per day.  Drinks OJ.  Eating candy every now and then.  Takes blood sugar 4 times a day.      Has been having a lot of heartburn, thinks he has been out of zantac, has been having some nausea.  Has to eat what is given, lives in a group home.  HbA1c is 12.2, not eating correctly all the time. Works 3 days a week, 6 hours at a time.  Last saw dr. Pacheco last week, still doing in/out cath.    Had labs done with Dr. Arechiga. Nl creatinine.      Office Visit on 10/09/2019   Component Date Value Ref Range Status    Hemoglobin A1C 10/09/2019 12.1  % Final   Telephone on 04/17/2019   Component Date Value Ref Range Status    Ferritin 04/19/2019 43  20 - 380 ng/mL Final    Iron 04/19/2019 64  50 - 180 mcg/dL Final    TIBC 04/19/2019 352  250 - 425 mcg/dL (calc) Final    Iron Saturation 04/19/2019 18  15 - 60 % (calc) Final    Glucose 04/19/2019 130* 65 - 99 mg/dL Final    BUN, Bld 04/19/2019 19  7 - 25 mg/dL Final    Creatinine 04/19/2019 1.25  0.70 - 1.33 mg/dL Final    eGFR if non African American 04/19/2019 63  > OR = 60 mL/min/1.73m2 Final    eGFR if African American 04/19/2019 73  > OR = 60 mL/min/1.73m2 Final    BUN/Creatinine Ratio 04/19/2019 NOT APPLICABLE  6 - 22 (calc) Final    Sodium 04/19/2019 135  135 - 146 mmol/L Final    Potassium 04/19/2019 4.4  3.5 - 5.3 mmol/L Final    Chloride 04/19/2019 99  98 - 110 mmol/L Final    CO2 04/19/2019 29  20 - 32 mmol/L Final    Calcium 04/19/2019 9.4  8.6 - 10.3 mg/dL Final    Total Protein 04/19/2019 7.5  6.1 - 8.1 g/dL Final    Albumin 04/19/2019 4.6  3.6 - 5.1 g/dL Final    Globulin, Total  04/19/2019 2.9  1.9 - 3.7 g/dL (calc) Final    Albumin/Globulin Ratio 04/19/2019 1.6  1.0 - 2.5 (calc) Final    Total Bilirubin 04/19/2019 0.5  0.2 - 1.2 mg/dL Final    Alkaline Phosphatase 04/19/2019 114  40 - 115 U/L Final    AST 04/19/2019 16  10 - 35 U/L Final    ALT 04/19/2019 14  9 - 46 U/L Final    WBC 04/19/2019 5.8  3.8 - 10.8 Thousand/uL Final    RBC 04/19/2019 4.72  4.20 - 5.80 Million/uL Final    Hemoglobin 04/19/2019 14.1  13.2 - 17.1 g/dL Final    Hematocrit 04/19/2019 41.4  38.5 - 50.0 % Final    Mean Corpuscular Volume 04/19/2019 87.7  80.0 - 100.0 fL Final    Mean Corpuscular Hemoglobin 04/19/2019 29.9  27.0 - 33.0 pg Final    Mean Corpuscular Hemoglobin Conc 04/19/2019 34.1  32.0 - 36.0 g/dL Final    RDW 04/19/2019 14.3  11.0 - 15.0 % Final    Platelets 04/19/2019 203  140 - 400 Thousand/uL Final    MPV 04/19/2019 10.6  7.5 - 12.5 fL Final    Neutrophils Absolute 04/19/2019 4,275  1,500 - 7,800 cells/uL Final    Lymph # 04/19/2019 1,015  850 - 3,900 cells/uL Final    Mono # 04/19/2019 360  200 - 950 cells/uL Final    Eos # 04/19/2019 99  15 - 500 cells/uL Final    Baso # 04/19/2019 52  0 - 200 cells/uL Final    Neutrophils Relative 04/19/2019 73.7  % Final    Lymph% 04/19/2019 17.5  % Final    Mono% 04/19/2019 6.2  % Final    Eosinophil% 04/19/2019 1.7  % Final    Basophil% 04/19/2019 0.9  % Final       Past Medical History:   Diagnosis Date    Development disorder, mixed     Diabetes mellitus type II     Mental and behavioral problems with communication (including speech)      Past Surgical History:   Procedure Laterality Date    FOOT SURGERY       Family History   Problem Relation Age of Onset    Glaucoma Mother        Marital Status: Single  Alcohol History:  reports that he does not drink alcohol.  Tobacco History:  reports that he quit smoking about 21 years ago. He has never used smokeless tobacco.  Drug History:  reports that he does not use drugs.    Review of  "patient's allergies indicates:   Allergen Reactions    Codeine     Morphine sulfate      Other reaction(s): Unknown       Current Outpatient Medications:     benztropine (COGENTIN) 1 MG tablet, Take 1 tablet (1 mg total) by mouth 2 (two) times daily., Disp: 180 tablet, Rfl: 2    CONTOUR METER Misc, UTD, Disp: , Rfl: 0    CONTOUR TEST STRIPS Strp, TEST QID PRN, Disp: , Rfl: 5    ferrous gluconate (FERGON) 324 MG tablet, Take 1 tablet (324 mg total) by mouth 2 (two) times daily., Disp: 60 tablet, Rfl: 2    fluoxetine (PROZAC) 10 MG Tab, Take 10 mg by mouth once daily., Disp: , Rfl:     gabapentin (NEURONTIN) 300 MG capsule, Take 1 capsule (300 mg total) by mouth 2 (two) times daily., Disp: 180 capsule, Rfl: 1    LATUDA 80 mg Tab tablet, TK 1 T PO BID, Disp: , Rfl: 1    OLANZapine (ZYPREXA) 10 MG tablet, Take 10 mg by mouth every evening., Disp: , Rfl:     ondansetron (ZOFRAN) 4 MG tablet, Take 1 tablet (4 mg total) by mouth once daily. for 30 doses, Disp: 30 tablet, Rfl: 2    oxcarbazepine (TRILEPTAL) 300 MG Tab, TK 1 T PO  QD, Disp: , Rfl: 1    potassium chloride (KLOR-CON) 20 mEq Pack, Take 20 mEq by mouth once., Disp: , Rfl:     pravastatin (PRAVACHOL) 40 MG tablet, Take 1 tablet (40 mg total) by mouth once daily., Disp: 90 tablet, Rfl: 1    quetiapine (SEROQUEL) 300 MG Tab, Take by mouth., Disp: , Rfl:     ranitidine (ZANTAC) 150 MG tablet, Take 1 tablet (150 mg total) by mouth 2 (two) times daily., Disp: 180 tablet, Rfl: 1    TRUEPLUS LANCETS 33 gauge Misc, USE AS DIRECTED QID, Disp: , Rfl: 5    ascorbic acid (VITAMIN C) 500 MG tablet, Take 500 mg by mouth once daily., Disp: , Rfl:     BD INSULIN PEN NEEDLE UF MINI 31 gauge x 3/16" Ndle, USE TWICE DAILY AS DIRECTED, Disp: , Rfl: 4    bisacodyl (GENTLE LAXATIVE) 5 mg EC tablet, Take 5 mg by mouth daily as needed., Disp: , Rfl:     divalproex (DEPAKOTE) 500 MG Tb24, Take 500 mg by mouth once daily., Disp: , Rfl:     hydrOXYzine (VISTARIL) 100 " "MG capsule, TK ONE C PO  QHS, Disp: , Rfl: 0    ibuprofen (ADVIL,MOTRIN) 600 MG tablet, Take 1 tablet (600 mg total) by mouth every 6 (six) hours as needed for Pain. (Patient not taking: Reported on 10/9/2019), Disp: 20 tablet, Rfl: 0    metFORMIN (GLUCOPHAGE-XR) 500 MG 24 hr tablet, Take 2 tablets (1,000 mg total) by mouth 2 (two) times daily with meals., Disp: 360 tablet, Rfl: 1    TOUJEO SOLOSTAR U-300 INSULIN 300 unit/mL (1.5 mL) InPn pen, INJECT 30 UNITS SQ D, Disp: , Rfl: 5    TRESIBA FLEXTOUCH U-100 100 unit/mL (3 mL) InPn, INJECT 30 UNITS UNDER THE SKIN ONCE DAILY, Disp: , Rfl: 2    Review of Systems   Constitutional: Negative for appetite change, fatigue, fever and unexpected weight change.   Respiratory: Negative for cough, chest tightness, shortness of breath and wheezing.    Cardiovascular: Negative for chest pain and leg swelling.   Gastrointestinal: Positive for nausea. Negative for abdominal pain, constipation and vomiting.   Genitourinary: Negative for decreased urine volume, difficulty urinating, dysuria and frequency.   Musculoskeletal: Negative for arthralgias, back pain, myalgias and neck pain.        +left hand stiffness since accident last month where he hurt his left 4th finger.  Went to ER and had it stitched, which has healed well.     Skin: Negative for rash.   Neurological: Negative for dizziness, weakness, numbness and headaches.   Hematological: Does not bruise/bleed easily.   Psychiatric/Behavioral: Negative for behavioral problems, sleep disturbance and suicidal ideas. The patient is nervous/anxious.           Objective:      Vitals:    10/09/19 1033   BP: (P) 118/72   Pulse: (P) 70   Weight: (P) 74.3 kg (163 lb 12.8 oz)   Height: (P) 5' 6" (1.676 m)     Body mass index is 26.44 kg/m² (pended).  Physical Exam   Constitutional: He is oriented to person, place, and time. He appears well-developed and well-nourished. No distress.   HENT:   Head: Normocephalic and atraumatic.   Neck: " Neck supple. No thyromegaly present.   Cardiovascular: Normal rate, regular rhythm and normal heart sounds. Exam reveals no friction rub.   No murmur heard.  Pulmonary/Chest: Effort normal and breath sounds normal. He has no wheezes. He has no rales.   Abdominal: Soft. Bowel sounds are normal. He exhibits no distension. There is no tenderness.   Musculoskeletal: He exhibits no edema.   Lymphadenopathy:     He has no cervical adenopathy.   Neurological: He is alert and oriented to person, place, and time.   Skin: Skin is warm and dry. No rash noted.   Psychiatric: His behavior is normal. Judgment and thought content normal. His mood appears anxious. His speech is not rapid and/or pressured. He is not agitated. Cognition and memory are normal. He is attentive.   Vitals reviewed.        Assessment:       1. DM type 2 with diabetic peripheral neuropathy    2. Gastroesophageal reflux disease, esophagitis presence not specified    3. Nausea    4. Hyperlipidemia, unspecified hyperlipidemia type    5. Schizophrenia, unspecified type    6. Bladder outlet obstruction         Plan:       DM type 2 with diabetic peripheral neuropathy  Comments:  Unxontrolled with HbA1c of 12.1%. Noncompliant with diet. Continue BS checks QID, given BS log, to return for adjustment  Orders:  -     POCT HEMOGLOBIN A1C  -     gabapentin (NEURONTIN) 300 MG capsule; Take 1 capsule (300 mg total) by mouth 2 (two) times daily.  Dispense: 180 capsule; Refill: 1  -     metFORMIN (GLUCOPHAGE-XR) 500 MG 24 hr tablet; Take 2 tablets (1,000 mg total) by mouth 2 (two) times daily with meals.  Dispense: 360 tablet; Refill: 1    Gastroesophageal reflux disease, esophagitis presence not specified  Comments:  Uncontrolled. Pt to resume zantac. Will continue to monitor symtomss  Orders:  -     ranitidine (ZANTAC) 150 MG tablet; Take 1 tablet (150 mg total) by mouth 2 (two) times daily.  Dispense: 180 tablet; Refill: 1    Nausea  Comments:  Chronic. Gastroparesis  vs. GERD vs. Dyspepsia. Will continue to monitor sympotms  Orders:  -     ondansetron (ZOFRAN) 4 MG tablet; Take 1 tablet (4 mg total) by mouth once daily. for 30 doses  Dispense: 30 tablet; Refill: 2    Hyperlipidemia, unspecified hyperlipidemia type  Comments:  Pt requested and received prescription for pravasatin today. Labs ordered for future monitoring of lipid profile  Orders:  -     pravastatin (PRAVACHOL) 40 MG tablet; Take 1 tablet (40 mg total) by mouth once daily.  Dispense: 90 tablet; Refill: 1    Schizophrenia, unspecified type  Comments:  Stable. Continue to follow with psyche.  Orders:  -     benztropine (COGENTIN) 1 MG tablet; Take 1 tablet (1 mg total) by mouth 2 (two) times daily.  Dispense: 180 tablet; Refill: 2    Bladder outlet obstruction  Comments:  Stable. Continues intermittent catheterization. To continue to follow up with Dr. Pacheco      Follow up in about 3 months (around 1/9/2020).

## 2019-10-13 RX ORDER — PRAVASTATIN SODIUM 40 MG/1
40 TABLET ORAL DAILY
Qty: 90 TABLET | Refills: 1 | Status: SHIPPED | OUTPATIENT
Start: 2019-10-13 | End: 2020-04-29 | Stop reason: SDUPTHER

## 2019-10-13 RX ORDER — BENZTROPINE MESYLATE 1 MG/1
1 TABLET ORAL 2 TIMES DAILY
Qty: 180 TABLET | Refills: 2 | Status: SHIPPED | OUTPATIENT
Start: 2019-10-13 | End: 2020-04-29 | Stop reason: SDUPTHER

## 2019-10-13 RX ORDER — METFORMIN HYDROCHLORIDE 500 MG/1
1000 TABLET, EXTENDED RELEASE ORAL 2 TIMES DAILY WITH MEALS
Qty: 360 TABLET | Refills: 1 | Status: SHIPPED | OUTPATIENT
Start: 2019-10-13 | End: 2020-04-29 | Stop reason: SDUPTHER

## 2019-10-13 RX ORDER — GABAPENTIN 300 MG/1
300 CAPSULE ORAL 2 TIMES DAILY
Qty: 180 CAPSULE | Refills: 1 | Status: SHIPPED | OUTPATIENT
Start: 2019-10-13 | End: 2020-04-29 | Stop reason: SDUPTHER

## 2019-10-13 RX ORDER — ONDANSETRON 4 MG/1
4 TABLET, FILM COATED ORAL DAILY
Qty: 30 TABLET | Refills: 2 | Status: SHIPPED | OUTPATIENT
Start: 2019-10-13 | End: 2019-10-19

## 2019-10-16 ENCOUNTER — TELEPHONE (OUTPATIENT)
Dept: HEMATOLOGY/ONCOLOGY | Facility: CLINIC | Age: 59
End: 2019-10-16

## 2019-10-16 NOTE — TELEPHONE ENCOUNTER
Called to see if the pt had any labs done prior to f/u appt.    BEAU for the labs to be done @ [facility]

## 2019-10-21 ENCOUNTER — OFFICE VISIT (OUTPATIENT)
Dept: ORTHOPEDICS | Facility: CLINIC | Age: 59
End: 2019-10-21
Payer: MEDICARE

## 2019-10-21 VITALS — BODY MASS INDEX: 27 KG/M2 | HEIGHT: 66 IN | WEIGHT: 168 LBS
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
## 2019-10-21 DIAGNOSIS — S82.831A CLOSED FRACTURE OF DISTAL END OF RIGHT FIBULA, UNSPECIFIED FRACTURE MORPHOLOGY, INITIAL ENCOUNTER: ICD-10-CM

## 2019-10-21 DIAGNOSIS — M79.671 RIGHT FOOT PAIN: Primary | ICD-10-CM

## 2019-10-21 PROCEDURE — 27786 TREATMENT OF ANKLE FRACTURE: CPT | Mod: PBBFAC,PN | Performed by: ORTHOPAEDIC SURGERY

## 2019-10-21 PROCEDURE — 97760 PR ORTHOTIC MGMT&TRAINJ INITIAL ENC EA 15 MINS: ICD-10-PCS | Mod: GP,S$PBB,, | Performed by: ORTHOPAEDIC SURGERY

## 2019-10-21 PROCEDURE — 97760 ORTHOTIC MGMT&TRAING 1ST ENC: CPT | Mod: GP,S$PBB,, | Performed by: ORTHOPAEDIC SURGERY

## 2019-10-21 PROCEDURE — 99999 PR PBB SHADOW E&M-EST. PATIENT-LVL II: ICD-10-PCS | Mod: PBBFAC,,, | Performed by: ORTHOPAEDIC SURGERY

## 2019-10-21 PROCEDURE — 99999 PR PBB SHADOW E&M-EST. PATIENT-LVL II: CPT | Mod: PBBFAC,,, | Performed by: ORTHOPAEDIC SURGERY

## 2019-10-21 PROCEDURE — 99204 PR OFFICE/OUTPT VISIT, NEW, LEVL IV, 45-59 MIN: ICD-10-PCS | Mod: 57,S$PBB,, | Performed by: ORTHOPAEDIC SURGERY

## 2019-10-21 PROCEDURE — 27786 PR CLOSED RX DIST FIBULA FX: ICD-10-PCS | Mod: S$PBB,RT,, | Performed by: ORTHOPAEDIC SURGERY

## 2019-10-21 PROCEDURE — 27786 TREATMENT OF ANKLE FRACTURE: CPT | Mod: S$PBB,RT,, | Performed by: ORTHOPAEDIC SURGERY

## 2019-10-21 PROCEDURE — 99204 OFFICE O/P NEW MOD 45 MIN: CPT | Mod: 57,S$PBB,, | Performed by: ORTHOPAEDIC SURGERY

## 2019-10-21 PROCEDURE — 99212 OFFICE O/P EST SF 10 MIN: CPT | Mod: PBBFAC,PN | Performed by: ORTHOPAEDIC SURGERY

## 2019-10-21 NOTE — PROGRESS NOTES
"   [facility] Hematology/Oncology  PROGRESS NOTE      Subjective:       Patient ID:   NAME: Matthieu Jovel : 1960     59 y.o. male    Referring Doc: Efren  Other Physicians:    Chief Complaint:  Iron defic anemia f/u    History of Present Illness:     Patient returns today for a regularly scheduled follow-up visit.  The patient is here with his transport person. He is resident of [facility]. He denies any new issues; no SOB, HA's or N/V. He recently fell and broke right ankle and he is under the care of Dr Barkley.           ROS:   GEN: normal without any fever, night sweats or weight loss  HEENT: normal with no HA's, sore throat, stiff neck, changes in vision  CV: normal with no CP, SOB, PND, PRADO or orthopnea  PULM: normal with no SOB, cough, hemoptysis, sputum or pleuritic pain  GI: normal with no abdominal pain, nausea, vomiting, constipation, diarrhea, melanotic stools, BRBPR, or hematemesis  : normal with no hematuria, dysuria  BREAST: normal with no mass, discharge, pain  SKIN: normal with no rash, erythema, bruising, or swelling    Allergies:  Review of patient's allergies indicates:   Allergen Reactions    Codeine        Medications:    Current Outpatient Medications:     ascorbic acid (VITAMIN C) 500 MG tablet, Take 500 mg by mouth once daily., Disp: , Rfl:     BD INSULIN PEN NEEDLE UF MINI 31 gauge x 3/16" Ndle, USE TWICE DAILY AS DIRECTED, Disp: , Rfl: 4    benztropine (COGENTIN) 1 MG tablet, Take 1 tablet (1 mg total) by mouth 2 (two) times daily., Disp: 180 tablet, Rfl: 2    bisacodyl (GENTLE LAXATIVE) 5 mg EC tablet, Take 5 mg by mouth daily as needed., Disp: , Rfl:     CONTOUR METER Misc, UTD, Disp: , Rfl: 0    CONTOUR TEST STRIPS Strp, TEST QID PRN, Disp: , Rfl: 5    divalproex (DEPAKOTE) 500 MG Tb24, Take 500 mg by mouth once daily., Disp: , Rfl:     ferrous gluconate (FERGON) 324 MG tablet, Take 1 tablet (324 mg total) by mouth 2 (two) times daily., Disp: 60 tablet, Rfl: 2    " fluoxetine (PROZAC) 10 MG Tab, Take 10 mg by mouth once daily., Disp: , Rfl:     gabapentin (NEURONTIN) 300 MG capsule, Take 1 capsule (300 mg total) by mouth 2 (two) times daily., Disp: 180 capsule, Rfl: 1    hydrOXYzine (VISTARIL) 100 MG capsule, TK ONE C PO  QHS, Disp: , Rfl: 0    ibuprofen (ADVIL,MOTRIN) 600 MG tablet, Take 1 tablet (600 mg total) by mouth every 6 (six) hours as needed for Pain., Disp: 20 tablet, Rfl: 0    LATUDA 80 mg Tab tablet, TK 1 T PO BID, Disp: , Rfl: 1    metFORMIN (GLUCOPHAGE-XR) 500 MG 24 hr tablet, Take 2 tablets (1,000 mg total) by mouth 2 (two) times daily with meals., Disp: 360 tablet, Rfl: 1    naproxen (NAPROSYN) 375 MG tablet, Take 1 tablet (375 mg total) by mouth 2 (two) times daily with meals., Disp: 30 tablet, Rfl: 0    OLANZapine (ZYPREXA) 10 MG tablet, Take 10 mg by mouth every evening., Disp: , Rfl:     oxcarbazepine (TRILEPTAL) 300 MG Tab, TK 1 T PO  QD, Disp: , Rfl: 1    potassium chloride (KLOR-CON) 20 mEq Pack, Take 20 mEq by mouth once., Disp: , Rfl:     pravastatin (PRAVACHOL) 40 MG tablet, Take 1 tablet (40 mg total) by mouth once daily., Disp: 90 tablet, Rfl: 1    quetiapine (SEROQUEL) 300 MG Tab, Take by mouth., Disp: , Rfl:     ranitidine (ZANTAC) 150 MG tablet, Take 1 tablet (150 mg total) by mouth 2 (two) times daily., Disp: 180 tablet, Rfl: 1    TOUJEO SOLOSTAR U-300 INSULIN 300 unit/mL (1.5 mL) InPn pen, INJECT 30 UNITS SQ D, Disp: , Rfl: 5    TRESIBA FLEXTOUCH U-100 100 unit/mL (3 mL) InPn, INJECT 30 UNITS UNDER THE SKIN ONCE DAILY, Disp: , Rfl: 2    TRUEPLUS LANCETS 33 gauge Misc, USE AS DIRECTED QID, Disp: , Rfl: 5    PMHx/PSHx Updates:  See patient's last visit with me on 4/22/2019  See H&P on 8/26/2013        Pathology:  none          Objective:     Vitals:  Blood pressure 137/79, pulse 74, temperature 98 °F (36.7 °C), temperature source Oral, resp. rate 20, weight 69.6 kg (153 lb 8 oz).    Physical Examination:   GEN: no apparent  distress, comfortable; AAOx3  HEAD: atraumatic and normocephalic  EYES: no pallor, no icterus, PERRLA  ENT: OMM, no pharyngeal erythema, external ears WNL; no nasal discharge; no thrush  NECK: no masses, thyroid normal, trachea midline, no LAD/LN's, supple  CV: RRR with no murmur; normal pulse; normal S1 and S2; no pedal edema  CHEST: Normal respiratory effort; CTAB; normal breath sounds; no wheeze or crackles  ABDOM: nontender and nondistended; soft; normal bowel sounds; no rebound/guarding  MUSC/Skeletal: ROM normal; no crepitus; joints normal; no deformities or arthropathy  EXTREM: no clubbing, cyanosis, inflammation or swelling; right ankle is in boot  SKIN: no rashes, lesions, ulcers, petechiae or subcutaneous nodules  : no patel  NEURO: grossly intact; motor/sensory WNL; AAOx3; no tremors  PSYCH: normal mood, affect and behavior  LYMPH: normal cervical, supraclavicular, axillary and groin LN's            Labs:     10/21/2019  Lab Results   Component Value Date    WBC 4.3 10/21/2019    HGB 11.6 (L) 10/21/2019    HCT 35.8 (L) 10/21/2019    MCV 92.5 10/21/2019     10/21/2019     CMP  Sodium   Date Value Ref Range Status   10/21/2019 132 (L) 135 - 146 mmol/L Final   10/22/2018 137 134 - 144 mmol/L      Potassium   Date Value Ref Range Status   10/21/2019 4.8 3.5 - 5.3 mmol/L Final     Chloride   Date Value Ref Range Status   10/21/2019 98 98 - 110 mmol/L Final   10/22/2018 105 98 - 110 mmol/L      CO2   Date Value Ref Range Status   10/21/2019 24 20 - 32 mmol/L Final     Glucose   Date Value Ref Range Status   10/21/2019 559 (H) 65 - 99 mg/dL Final     Comment:     Verified by repeat analysis.                   Fasting reference interval     For someone without known diabetes, a glucose  value >125 mg/dL indicates that they may have  diabetes and this should be confirmed with a  follow-up test.        10/22/2018 356 (H) 70 - 99 mg/dL      BUN, Bld   Date Value Ref Range Status   10/21/2019 20 7 - 25 mg/dL  Final     Creatinine   Date Value Ref Range Status   10/21/2019 1.30 0.70 - 1.33 mg/dL Final     Comment:     For patients >49 years of age, the reference limit  for Creatinine is approximately 13% higher for people  identified as -American.        10/22/2018 1.13 0.60 - 1.40 mg/dL      Calcium   Date Value Ref Range Status   10/21/2019 8.8 8.6 - 10.3 mg/dL Final     Total Protein   Date Value Ref Range Status   10/21/2019 6.6 6.1 - 8.1 g/dL Final     Albumin   Date Value Ref Range Status   10/21/2019 3.7 3.6 - 5.1 g/dL Final   10/22/2018 3.7 3.1 - 4.7 g/dL      Total Bilirubin   Date Value Ref Range Status   10/21/2019 0.4 0.2 - 1.2 mg/dL Final     Alkaline Phosphatase   Date Value Ref Range Status   10/21/2019 129 (H) 40 - 115 U/L Final     AST   Date Value Ref Range Status   10/21/2019 11 10 - 35 U/L Final     ALT   Date Value Ref Range Status   10/21/2019 11 9 - 46 U/L Final     eGFR if    Date Value Ref Range Status   10/21/2019 70 > OR = 60 mL/min/1.73m2 Final     eGFR if non    Date Value Ref Range Status   10/21/2019 60 > OR = 60 mL/min/1.73m2 Final     Lab Results   Component Value Date    IRON 40 (L) 10/21/2019    TIBC 265 10/21/2019    FERRITIN 43 04/19/2019           Radiology/Diagnostic Studies:    No results found.    I have reviewed all available lab results and radiology reports.    Assessment/Plan:   (1) 59 y.o. male with diagnosis of psychiatric and mental issues who is a resident of [facility]    (2) Mild chronic pancytopenia suspected secondary to antipsychotic meds  - he had some recent labs done and CBC was adequate but hgb was a little lower this time  - hgb was 11.6  - he is on oral iron    (3) iron defic anemia in past    (4) Noncompliance with follow-up and labs    1. Other secondary thrombocytopenia     2. Leukopenia, unspecified type     3. Pancytopenia     4. Iron deficiency anemia, unspecified iron deficiency anemia type           PLAN:  1.  Check labs monthly for now to make sure the hgb improves back to normal; continue oral iron  2. F/u with PCP  3. encouraged compliance  4.  RTC in 3-4 months  Fax note to Efren        Discussion:     I have explained all of the above in detail and the patient understands all of the current recommendation(s). I have answered all of their questions to the best of my ability and to their complete satisfaction.   The patient is to continue with the current management plan.            Electronically signed by Wing Arechiga MD

## 2019-10-21 NOTE — PROGRESS NOTES
HISTORY OF PRESENT ILLNESS:  A 59 year old, two days  ago, tripped and fell and injured his right ankle.  It  has been painful since then.  He was given a boot at  outlying facility, comes today for followup and  treatment.    PHYSICAL EXAMINATION:  Exam today shows he has  tenderness to the right distal fibula.  Skin is intact.   Compartments are soft.  Overall, good alignment.  No  bruising detected.    X-rays show distal tibial fracture with good alignment.    ASSESSMENT:  Right distal fibular fracture.    PLAN:  Boot.  He can weightbear to tolerance.  I will  have him come back in about a month's time as a  postoperative visit with x-rays of his right ankle.        PBB/HN  dd: 10/21/2019 12:09:43 (CDT)  td: 10/22/2019 01:37:35 (CDT)  Doc ID   #[id number]  Job ID #[id number]    CC:     Further History  Aching pain  Worse with activity  Relieved with rest  No other associated symptoms  No other radiation    Further Exam  Alert and oriented  Pleasant  Contralateral limb has appropriate range of motion for age and condition  Contralateral limb has appropriate strength for age and condition  Contralateral limb has appropriate stability  for age and condition  No adenopathy  Pulses are appropriate for current condition  Skin is intact        Chief Complaint    Chief Complaint   Patient presents with    Right Foot - Pain, Injury, Swelling       HPI  Matthieu Jovel is a 59 y.o.  male who presents with       Past Medical History  Past Medical History:   Diagnosis Date    Development disorder, mixed     Diabetes mellitus type II     Mental and behavioral problems with communication (including speech)        Past Surgical History  Past Surgical History:   Procedure Laterality Date    FOOT SURGERY         Medications  Current Outpatient Medications   Medication Sig    benztropine (COGENTIN) 1 MG tablet Take 1 tablet (1 mg total) by mouth 2 (two) times daily.    ferrous gluconate (FERGON) 324 MG tablet Take 1 tablet (324  "mg total) by mouth 2 (two) times daily.    fluoxetine (PROZAC) 10 MG Tab Take 10 mg by mouth once daily.    gabapentin (NEURONTIN) 300 MG capsule Take 1 capsule (300 mg total) by mouth 2 (two) times daily.    LATUDA 80 mg Tab tablet TK 1 T PO BID    metFORMIN (GLUCOPHAGE-XR) 500 MG 24 hr tablet Take 2 tablets (1,000 mg total) by mouth 2 (two) times daily with meals.    OLANZapine (ZYPREXA) 10 MG tablet Take 10 mg by mouth every evening.    oxcarbazepine (TRILEPTAL) 300 MG Tab TK 1 T PO  QD    potassium chloride (KLOR-CON) 20 mEq Pack Take 20 mEq by mouth once.    pravastatin (PRAVACHOL) 40 MG tablet Take 1 tablet (40 mg total) by mouth once daily.    quetiapine (SEROQUEL) 300 MG Tab Take by mouth.    ranitidine (ZANTAC) 150 MG tablet Take 1 tablet (150 mg total) by mouth 2 (two) times daily.    ascorbic acid (VITAMIN C) 500 MG tablet Take 500 mg by mouth once daily.    BD INSULIN PEN NEEDLE UF MINI 31 gauge x 3/16" Ndle USE TWICE DAILY AS DIRECTED    bisacodyl (GENTLE LAXATIVE) 5 mg EC tablet Take 5 mg by mouth daily as needed.    CONTOUR METER Misc UTD    CONTOUR TEST STRIPS Strp TEST QID PRN    divalproex (DEPAKOTE) 500 MG Tb24 Take 500 mg by mouth once daily.    hydrOXYzine (VISTARIL) 100 MG capsule TK ONE C PO  QHS    ibuprofen (ADVIL,MOTRIN) 600 MG tablet Take 1 tablet (600 mg total) by mouth every 6 (six) hours as needed for Pain.    naproxen (NAPROSYN) 375 MG tablet Take 1 tablet (375 mg total) by mouth 2 (two) times daily with meals.    TOUJEO SOLOSTAR U-300 INSULIN 300 unit/mL (1.5 mL) InPn pen INJECT 30 UNITS SQ D    TRESIBA FLEXTOUCH U-100 100 unit/mL (3 mL) InPn INJECT 30 UNITS UNDER THE SKIN ONCE DAILY    TRUEPLUS LANCETS 33 gauge Misc USE AS DIRECTED QID     No current facility-administered medications for this visit.        Allergies  Review of patient's allergies indicates:   Allergen Reactions    Codeine     Morphine sulfate Other (See Comments)     Other reaction(s): Unknown "       Family History  Family History   Problem Relation Age of Onset    Glaucoma Mother        Social History  Social History     Socioeconomic History    Marital status: Single     Spouse name: Not on file    Number of children: Not on file    Years of education: Not on file    Highest education level: Not on file   Occupational History    Not on file   Social Needs    Financial resource strain: Not on file    Food insecurity:     Worry: Not on file     Inability: Not on file    Transportation needs:     Medical: Not on file     Non-medical: Not on file   Tobacco Use    Smoking status: Former Smoker     Last attempt to quit: 1998     Years since quittin.5    Smokeless tobacco: Never Used   Substance and Sexual Activity    Alcohol use: No    Drug use: No    Sexual activity: Not on file   Lifestyle    Physical activity:     Days per week: Not on file     Minutes per session: Not on file    Stress: Not on file   Relationships    Social connections:     Talks on phone: Not on file     Gets together: Not on file     Attends Protestant service: Not on file     Active member of club or organization: Not on file     Attends meetings of clubs or organizations: Not on file     Relationship status: Not on file   Other Topics Concern    Not on file   Social History Narrative    Not on file               Review of Systems     Constitutional: Negative    HENT: Negative  Eyes: Negative  Respiratory: Negative  Cardiovascular: Negative  Musculoskeletal: HPI  Skin: Negative  Neurological: Negative  Hematological: Negative  Endocrine: Negative                 Physical Exam    There were no vitals filed for this visit.  Body mass index is 27.12 kg/m².  Physical Examination:     General appearance -  well appearing, and in no distress  Mental status - awake  Neck - supple  Chest -  symmetric air entry  Heart - normal rate   Abdomen - soft      Assessment     1. Right foot pain    2. Fall with injury, initial  encounter    3. Closed fracture of distal end of right fibula, unspecified fracture morphology, initial encounter      We performed a custom orthotic/brace fitting, adjusting and training with the patient. The patient demonstrated understanding and proper care. This was performed for 15 minutes.        Plan

## 2019-10-22 ENCOUNTER — OFFICE VISIT (OUTPATIENT)
Dept: HEMATOLOGY/ONCOLOGY | Facility: CLINIC | Age: 59
End: 2019-10-22
Payer: MEDICARE

## 2019-10-22 ENCOUNTER — TELEPHONE (OUTPATIENT)
Dept: HEMATOLOGY/ONCOLOGY | Facility: CLINIC | Age: 59
End: 2019-10-22

## 2019-10-22 VITALS
DIASTOLIC BLOOD PRESSURE: 79 MMHG | WEIGHT: 153.5 LBS | SYSTOLIC BLOOD PRESSURE: 137 MMHG | HEART RATE: 74 BPM | RESPIRATION RATE: 20 BRPM | TEMPERATURE: 98 F | BODY MASS INDEX: 24.78 KG/M2

## 2019-10-22 DIAGNOSIS — D50.9 IRON DEFICIENCY ANEMIA, UNSPECIFIED IRON DEFICIENCY ANEMIA TYPE: ICD-10-CM

## 2019-10-22 DIAGNOSIS — D72.819 LEUKOPENIA, UNSPECIFIED TYPE: ICD-10-CM

## 2019-10-22 DIAGNOSIS — D61.818 PANCYTOPENIA: ICD-10-CM

## 2019-10-22 DIAGNOSIS — D69.59 OTHER SECONDARY THROMBOCYTOPENIA: Primary | ICD-10-CM

## 2019-10-22 PROCEDURE — 99213 OFFICE O/P EST LOW 20 MIN: CPT | Mod: S$GLB,,, | Performed by: INTERNAL MEDICINE

## 2019-10-22 PROCEDURE — 99213 PR OFFICE/OUTPT VISIT, EST, LEVL III, 20-29 MIN: ICD-10-PCS | Mod: S$GLB,,, | Performed by: INTERNAL MEDICINE

## 2019-10-23 ENCOUNTER — TELEPHONE (OUTPATIENT)
Dept: FAMILY MEDICINE | Facility: CLINIC | Age: 59
End: 2019-10-23

## 2019-10-23 LAB
ALBUMIN SERPL-MCNC: 3.7 G/DL (ref 3.6–5.1)
ALBUMIN/GLOB SERPL: 1.3 (CALC) (ref 1–2.5)
ALP SERPL-CCNC: 129 U/L (ref 40–115)
ALT SERPL-CCNC: 11 U/L (ref 9–46)
AST SERPL-CCNC: 11 U/L (ref 10–35)
BASOPHILS # BLD AUTO: 60 CELLS/UL (ref 0–200)
BASOPHILS NFR BLD AUTO: 1.4 %
BILIRUB SERPL-MCNC: 0.4 MG/DL (ref 0.2–1.2)
BUN SERPL-MCNC: 20 MG/DL (ref 7–25)
BUN/CREAT SERPL: ABNORMAL (CALC) (ref 6–22)
CALCIUM SERPL-MCNC: 8.8 MG/DL (ref 8.6–10.3)
CHLORIDE SERPL-SCNC: 98 MMOL/L (ref 98–110)
CO2 SERPL-SCNC: 24 MMOL/L (ref 20–32)
CREAT SERPL-MCNC: 1.3 MG/DL (ref 0.7–1.33)
EOSINOPHIL # BLD AUTO: 30 CELLS/UL (ref 15–500)
EOSINOPHIL NFR BLD AUTO: 0.7 %
ERYTHROCYTE [DISTWIDTH] IN BLOOD BY AUTOMATED COUNT: 13.2 % (ref 11–15)
FERRITIN SERPL-MCNC: 79 NG/ML (ref 38–380)
GFRSERPLBLD MDRD-ARVRAT: 60 ML/MIN/1.73M2
GLOBULIN SER CALC-MCNC: 2.9 G/DL (CALC) (ref 1.9–3.7)
GLUCOSE SERPL-MCNC: 559 MG/DL (ref 65–99)
HCT VFR BLD AUTO: 35.8 % (ref 38.5–50)
HGB BLD-MCNC: 11.6 G/DL (ref 13.2–17.1)
IRON SATN MFR SERPL: 15 % (CALC) (ref 20–48)
IRON SERPL-MCNC: 40 MCG/DL (ref 50–180)
LYMPHOCYTES # BLD AUTO: 520 CELLS/UL (ref 850–3900)
LYMPHOCYTES NFR BLD AUTO: 12.1 %
MCH RBC QN AUTO: 30 PG (ref 27–33)
MCHC RBC AUTO-ENTMCNC: 32.4 G/DL (ref 32–36)
MCV RBC AUTO: 92.5 FL (ref 80–100)
MONOCYTES # BLD AUTO: 271 CELLS/UL (ref 200–950)
MONOCYTES NFR BLD AUTO: 6.3 %
NEUTROPHILS # BLD AUTO: 3419 CELLS/UL (ref 1500–7800)
NEUTROPHILS NFR BLD AUTO: 79.5 %
PLATELET # BLD AUTO: 224 THOUSAND/UL (ref 140–400)
PMV BLD REES-ECKER: 10.9 FL (ref 7.5–12.5)
POTASSIUM SERPL-SCNC: 4.8 MMOL/L (ref 3.5–5.3)
PROT SERPL-MCNC: 6.6 G/DL (ref 6.1–8.1)
RBC # BLD AUTO: 3.87 MILLION/UL (ref 4.2–5.8)
SODIUM SERPL-SCNC: 132 MMOL/L (ref 135–146)
TIBC SERPL-MCNC: 265 MCG/DL (CALC) (ref 250–425)
WBC # BLD AUTO: 4.3 THOUSAND/UL (ref 3.8–10.8)

## 2019-10-23 NOTE — TELEPHONE ENCOUNTER
Call pt and let him know that his sugar is too high. He is at risk for coma and associated complications. Pt needs to follow ADA diet, not eat/drink sugars and starches.  May need to see a dietician. Pt needs to take sugar regularly, take insulin and make sure that he brings log to his appointment

## 2019-10-23 NOTE — TELEPHONE ENCOUNTER
Spoke with pt  and informed her about pt elevated blood glucose levels and Dr. Schwartz's recommendations.  states that pt is taking and keeping a log of blood sugars 3 x's daily and that she would find a dietician for pt.

## 2019-11-06 DIAGNOSIS — E11.42 DM TYPE 2 WITH DIABETIC PERIPHERAL NEUROPATHY: Primary | ICD-10-CM

## 2019-11-06 RX ORDER — POTASSIUM CHLORIDE 20 MEQ/1
TABLET, EXTENDED RELEASE ORAL
COMMUNITY
End: 2019-11-06

## 2019-11-06 RX ORDER — POTASSIUM CHLORIDE 20 MEQ/1
20 TABLET, EXTENDED RELEASE ORAL DAILY
Qty: 90 TABLET | Refills: 1 | Status: SHIPPED | OUTPATIENT
Start: 2019-11-06 | End: 2020-02-04

## 2019-11-06 NOTE — TELEPHONE ENCOUNTER
The patient's prescription has been approved and sent to   [id number] - [place], [place] - [address] [facility] [address]  [address] [facility] [facility] [address]  Phone: [phone] Fax: [phone]

## 2019-11-06 NOTE — TELEPHONE ENCOUNTER
----- Message from Palma Singh sent at 11/6/2019 10:12 AM CST -----  Pt needs a refill for Potassium. [facility] [place] [facility] [address] [address]. Mireille @[phone]

## 2019-11-08 DIAGNOSIS — E11.42 DM TYPE 2 WITH DIABETIC PERIPHERAL NEUROPATHY: ICD-10-CM

## 2019-11-08 RX ORDER — INSULIN DEGLUDEC 100 U/ML
INJECTION, SOLUTION SUBCUTANEOUS
Qty: 15 ML | Refills: 5 | Status: SHIPPED | OUTPATIENT
Start: 2019-11-08 | End: 2020-04-29 | Stop reason: ALTCHOICE

## 2019-11-08 RX ORDER — INSULIN GLARGINE 300 U/ML
INJECTION, SOLUTION SUBCUTANEOUS
Qty: 9 ML | Refills: 5 | Status: SHIPPED | OUTPATIENT
Start: 2019-11-08 | End: 2020-04-29 | Stop reason: SDUPTHER

## 2019-11-08 NOTE — TELEPHONE ENCOUNTER
The patient's prescription has been approved and sent to   [id number] - [place], [place] - [address] [facility] [address] [facility] [address] [facility] [address]  [address] [facility] [address]  Phone: [phone] Fax: [phone]

## 2019-11-08 NOTE — TELEPHONE ENCOUNTER
----- Message from Tanja Banks sent at 11/8/2019  9:15 AM CST -----  Contact: pt  Pt needs refill on both of insulin rxs    [facility]    [phone]

## 2019-11-15 DIAGNOSIS — S82.831A CLOSED FRACTURE OF DISTAL END OF RIGHT FIBULA, UNSPECIFIED FRACTURE MORPHOLOGY, INITIAL ENCOUNTER: ICD-10-CM

## 2019-11-15 DIAGNOSIS — M79.671 RIGHT FOOT PAIN: Primary | ICD-10-CM

## 2019-11-15 DIAGNOSIS — S82.831D CLOSED FRACTURE OF DISTAL END OF RIGHT FIBULA WITH ROUTINE HEALING, UNSPECIFIED FRACTURE MORPHOLOGY, SUBSEQUENT ENCOUNTER: ICD-10-CM

## 2019-11-18 ENCOUNTER — HOSPITAL ENCOUNTER (OUTPATIENT)
Dept: RADIOLOGY | Facility: HOSPITAL | Age: 59
Discharge: HOME | End: 2019-11-18
Attending: ORTHOPAEDIC SURGERY
Payer: MEDICARE

## 2019-11-18 ENCOUNTER — OFFICE VISIT (OUTPATIENT)
Dept: ORTHOPEDICS | Facility: CLINIC | Age: 59
End: 2019-11-18
Payer: MEDICARE

## 2019-11-18 VITALS — BODY MASS INDEX: 24.59 KG/M2 | HEIGHT: 66 IN | WEIGHT: 153 LBS

## 2019-11-18 DIAGNOSIS — S82.831D CLOSED FRACTURE OF DISTAL END OF RIGHT FIBULA WITH ROUTINE HEALING, UNSPECIFIED FRACTURE MORPHOLOGY, SUBSEQUENT ENCOUNTER: ICD-10-CM

## 2019-11-18 DIAGNOSIS — M25.571 ACUTE RIGHT ANKLE PAIN: ICD-10-CM

## 2019-11-18 DIAGNOSIS — M79.671 RIGHT FOOT PAIN: ICD-10-CM

## 2019-11-18 DIAGNOSIS — S82.831D CLOSED FRACTURE OF DISTAL END OF RIGHT FIBULA WITH ROUTINE HEALING, UNSPECIFIED FRACTURE MORPHOLOGY, SUBSEQUENT ENCOUNTER: Primary | ICD-10-CM
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
## 2019-11-18 PROCEDURE — 73610 X-RAY EXAM OF ANKLE: CPT | Mod: 26,RT,, | Performed by: RADIOLOGY

## 2019-11-18 PROCEDURE — 99999 PR PBB SHADOW E&M-EST. PATIENT-LVL II: CPT | Mod: PBBFAC,,, | Performed by: ORTHOPAEDIC SURGERY

## 2019-11-18 PROCEDURE — 99999 PR PBB SHADOW E&M-EST. PATIENT-LVL II: ICD-10-PCS | Mod: PBBFAC,,, | Performed by: ORTHOPAEDIC SURGERY

## 2019-11-18 PROCEDURE — 99212 OFFICE O/P EST SF 10 MIN: CPT | Mod: PBBFAC,25,PN | Performed by: ORTHOPAEDIC SURGERY

## 2019-11-18 PROCEDURE — 99024 PR POST-OP FOLLOW-UP VISIT: ICD-10-PCS | Mod: POP,,, | Performed by: ORTHOPAEDIC SURGERY

## 2019-11-18 PROCEDURE — 73610 X-RAY EXAM OF ANKLE: CPT | Mod: TC,PO,RT

## 2019-11-18 PROCEDURE — 99024 POSTOP FOLLOW-UP VISIT: CPT | Mod: POP,,, | Performed by: ORTHOPAEDIC SURGERY

## 2019-11-18 PROCEDURE — 73610 XR ANKLE COMPLETE 3 VIEW RIGHT: ICD-10-PCS | Mod: 26,RT,, | Performed by: RADIOLOGY

## 2019-11-18 NOTE — PROGRESS NOTES
HISTORY OF PRESENT ILLNESS:  A little over a month out from distal fibular   fracture, not been wearing the boot, reports no pain.  He is nontender.    X-rays look good.    PLAN:  He can continue with weightbearing to tolerance.  We can check him back   in several weeks' time to see how things are coming along.      PBB/HN  dd: 11/18/2019 11:31:02 (CST)  td: 11/19/2019 01:02:05 (CST)  Doc ID   #[id number]  Job ID #[id number]    CC:

## 2020-01-03 ENCOUNTER — TELEPHONE (OUTPATIENT)
Dept: FAMILY MEDICINE | Facility: CLINIC | Age: 60
End: 2020-01-03

## 2020-01-03 NOTE — TELEPHONE ENCOUNTER
Called [facility] and asked Karen Nguyen pt  and was informed that there was no one there by that name.    Pt has upcoming appointment on 1/9/2020

## 2020-01-03 NOTE — TELEPHONE ENCOUNTER
----- Message from Nikos Zamudio sent at 1/3/2020  9:41 AM CST -----  Contact: [facility]   Pt needs a HFU . He was admitted at [facility] and discharged last Friday. Please give Ms. Suzanne a call back # [phone]

## 2020-01-08 DIAGNOSIS — E11.42 DM TYPE 2 WITH DIABETIC PERIPHERAL NEUROPATHY: ICD-10-CM

## 2020-01-08 RX ORDER — PEN NEEDLE, DIABETIC 31 GX5/16"
1 NEEDLE, DISPOSABLE MISCELLANEOUS 2 TIMES DAILY
Qty: 90 EACH | Refills: 0 | Status: SHIPPED | OUTPATIENT
Start: 2020-01-08 | End: 2020-03-13 | Stop reason: SDUPTHER

## 2020-01-08 RX ORDER — LANCETS 33 GAUGE
1 EACH MISCELLANEOUS 4 TIMES DAILY
Qty: 200 EACH | Refills: 0 | Status: SHIPPED | OUTPATIENT
Start: 2020-01-08 | End: 2021-03-23 | Stop reason: SDUPTHER

## 2020-01-08 NOTE — TELEPHONE ENCOUNTER
----- Message from Tanja Arreguin sent at 1/8/2020  9:36 AM CST -----  Contact: pts  (chelo)  Pt called and needs refills his     Pin Collison  Lancets    [facility] in [place] ([facility])    [phone]    ## she said she also called last week b/c pt went in the hospital ([facility]) and needed a HFU and hasn't heard back.

## 2020-01-09 ENCOUNTER — TELEPHONE (OUTPATIENT)
Dept: FAMILY MEDICINE | Facility: CLINIC | Age: 60
End: 2020-01-09

## 2020-01-09 NOTE — TELEPHONE ENCOUNTER
Unable to reach pt - Spoke with aide and she stated that she would call back when she is near pt so they can reschedule.

## 2020-01-09 NOTE — TELEPHONE ENCOUNTER
The patient's prescription has been approved and sent to   [id number] - [place], [place] - [address] [facility] [address]  [address] [facility] [address]  Phone: [phone] Fax: [phone]

## 2020-01-30 DIAGNOSIS — R11.0 NAUSEA: Primary | ICD-10-CM

## 2020-01-30 RX ORDER — ONDANSETRON 4 MG/1
4 TABLET, FILM COATED ORAL EVERY 6 HOURS PRN
Qty: 30 TABLET | Refills: 0 | Status: SHIPPED | OUTPATIENT
Start: 2020-01-30 | End: 2020-01-30

## 2020-01-30 RX ORDER — ONDANSETRON 4 MG/1
4 TABLET, FILM COATED ORAL EVERY 6 HOURS PRN
Qty: 30 TABLET | Refills: 0 | Status: SHIPPED | OUTPATIENT
Start: 2020-01-30 | End: 2020-02-09

## 2020-01-30 NOTE — TELEPHONE ENCOUNTER
----- Message from Ted Varma sent at 1/30/2020 10:23 AM CST -----  Refills on nausea meds   Pharm [facility] on [address]   Pt [phone]

## 2020-01-30 NOTE — TELEPHONE ENCOUNTER
The patient's prescription has been approved and sent to   [id number] - [place], [place] - [address] [facility] [address]  [address] [facility] [address]  Phone: [phone] Fax: [phone]

## 2020-02-05 DIAGNOSIS — D50.9 IRON DEFICIENCY ANEMIA, UNSPECIFIED: ICD-10-CM

## 2020-02-05 DIAGNOSIS — D69.59 OTHER SECONDARY THROMBOCYTOPENIA: ICD-10-CM

## 2020-02-05 DIAGNOSIS — D61.818 PANCYTOPENIA: ICD-10-CM

## 2020-02-05 DIAGNOSIS — D72.819 LEUKOPENIA, UNSPECIFIED TYPE: ICD-10-CM

## 2020-02-05 RX ORDER — FERROUS GLUCONATE 324(38)MG
TABLET ORAL
Qty: 60 TABLET | Refills: 2 | Status: SHIPPED | OUTPATIENT
Start: 2020-02-05 | End: 2020-03-13 | Stop reason: SDUPTHER

## 2020-02-11 DIAGNOSIS — F20.9 SCHIZOPHRENIA, UNSPECIFIED TYPE: ICD-10-CM

## 2020-02-11 DIAGNOSIS — K21.9 GASTROESOPHAGEAL REFLUX DISEASE, ESOPHAGITIS PRESENCE NOT SPECIFIED: Primary | ICD-10-CM

## 2020-02-11 RX ORDER — OXCARBAZEPINE 300 MG/1
300 TABLET, FILM COATED ORAL DAILY
Qty: 90 TABLET | Refills: 1 | Status: SHIPPED | OUTPATIENT
Start: 2020-02-11 | End: 2023-05-10

## 2020-02-11 NOTE — TELEPHONE ENCOUNTER
The patient's prescription has been approved and sent to   [facility][id number] - [place], [place] - [address] [facility] [address] [facility] [address]  [address] [facility] [address]  Phone: [phone] Fax: [phone]

## 2020-02-11 NOTE — TELEPHONE ENCOUNTER
----- Message from Ted Varma sent at 2/11/2020 11:09 AM CST -----  Ranitidine    Oxcarbazepine   Pharm [facility] [phone]

## 2020-02-18 ENCOUNTER — OFFICE VISIT (OUTPATIENT)
Dept: FAMILY MEDICINE | Facility: CLINIC | Age: 60
End: 2020-02-18
Payer: MEDICARE

## 2020-02-18 DIAGNOSIS — K59.04 CHRONIC IDIOPATHIC CONSTIPATION: ICD-10-CM

## 2020-02-18 DIAGNOSIS — K21.9 GASTROESOPHAGEAL REFLUX DISEASE, ESOPHAGITIS PRESENCE NOT SPECIFIED: ICD-10-CM

## 2020-02-18 DIAGNOSIS — E11.42 DM TYPE 2 WITH DIABETIC PERIPHERAL NEUROPATHY: Primary | ICD-10-CM

## 2020-02-18 DIAGNOSIS — K08.9 POOR DENTITION: ICD-10-CM

## 2020-02-18 DIAGNOSIS — F20.9 SCHIZOPHRENIA, UNSPECIFIED TYPE: ICD-10-CM

## 2020-02-18 DIAGNOSIS — N32.0 BLADDER OUTLET OBSTRUCTION: ICD-10-CM

## 2020-02-18 PROCEDURE — 99214 PR OFFICE/OUTPT VISIT, EST, LEVL IV, 30-39 MIN: ICD-10-PCS | Mod: S$GLB,,, | Performed by: FAMILY MEDICINE

## 2020-02-18 PROCEDURE — 99214 OFFICE O/P EST MOD 30 MIN: CPT | Mod: S$GLB,,, | Performed by: FAMILY MEDICINE

## 2020-02-18 RX ORDER — HYDROXYZINE PAMOATE 25 MG/1
25 CAPSULE ORAL 2 TIMES DAILY
Qty: 180 CAPSULE | Refills: 1 | Status: SHIPPED | OUTPATIENT
Start: 2020-02-18 | End: 2020-05-21

## 2020-02-18 RX ORDER — POTASSIUM CHLORIDE 20 MEQ/1
20 TABLET, EXTENDED RELEASE ORAL DAILY
COMMUNITY
End: 2021-01-07 | Stop reason: SDUPTHER

## 2020-02-18 RX ORDER — LINACLOTIDE 290 UG/1
1 CAPSULE, GELATIN COATED ORAL DAILY
COMMUNITY
Start: 2020-01-15 | End: 2021-04-29 | Stop reason: SDUPTHER

## 2020-02-18 RX ORDER — ONDANSETRON 4 MG/1
1 TABLET, ORALLY DISINTEGRATING ORAL DAILY
COMMUNITY
Start: 2019-12-25 | End: 2021-09-21 | Stop reason: SDUPTHER

## 2020-02-18 NOTE — PROGRESS NOTES
SUBJECTIVE:    Patient ID: Matthieu Jovel is a 60 y.o. male.    Chief Complaint: Diabetes (check up)    Pt here to checkup on chronic conditions.      Pt has been in the hospital a few time due to issues with constipation.  (Dr. Danielle) Has to repeat cscope due to inadequate prep.    Pt has only lost 2 pounds since last visit.  Admits to eating 3 meals a day.  Snacking once day.  Drinking some water.  Drinks coke zero, about 3 per day.  Not drinking OJ, and eating candy.  Takes blood sugar 4 times a day.      Since last appt, now living in an apartment, feels like he has more freedom, and is happier there.  They still bring him food and sometimes they bring him food.    Denies heartburn, no longer taking protonix.  HbA1c is 11, did bring in a log today, with BS range from 107-351 fasting.  Highest BS id [id number].   Although looking at log and having pt explain it to me, I am not sure if pt is taking insulin at the correct times.  Has trouble eating due to poor dentition.  Pt complaining of issues with his teeth, but not willing to save his funds from working to get his teeth fixed. Works 2 days a week, 6 hours at a time.    Pt needs to get teeth pulled.     Continue to see Dr. Pacheco, has an appt in May, still doing in/out cath.    Had labs done with Dr. Arechiga. Nl creatinine.      Admission on 12/23/2019, Discharged on 12/23/2019   Component Date Value Ref Range Status    POC Molecular Influenza A Ag 12/23/2019 Negative  Negative, Not Reported Final    POC Molecular Influenza B Ag 12/23/2019 Negative  Negative, Not Reported Final     Acceptable 12/23/2019 Yes   Final   Orders Only on 10/21/2019   Component Date Value Ref Range Status    Iron 10/21/2019 40* 50 - 180 mcg/dL Final    TIBC 10/21/2019 265  250 - 425 mcg/dL (calc) Final    Iron Saturation 10/21/2019 15* 20 - 48 % (calc) Final    Glucose 10/21/2019 559* 65 - 99 mg/dL Final    BUN, Bld 10/21/2019 20  7 - 25 mg/dL Final     Creatinine 10/21/2019 1.30  0.70 - 1.33 mg/dL Final    eGFR if non African American 10/21/2019 60  > OR = 60 mL/min/1.73m2 Final    eGFR if  10/21/2019 70  > OR = 60 mL/min/1.73m2 Final    BUN/Creatinine Ratio 10/21/2019 NOT APPLICABLE  6 - 22 (calc) Final    Sodium 10/21/2019 132* 135 - 146 mmol/L Final    Potassium 10/21/2019 4.8  3.5 - 5.3 mmol/L Final    Chloride 10/21/2019 98  98 - 110 mmol/L Final    CO2 10/21/2019 24  20 - 32 mmol/L Final    Calcium 10/21/2019 8.8  8.6 - 10.3 mg/dL Final    Total Protein 10/21/2019 6.6  6.1 - 8.1 g/dL Final    Albumin 10/21/2019 3.7  3.6 - 5.1 g/dL Final    Globulin, Total 10/21/2019 2.9  1.9 - 3.7 g/dL (calc) Final    Albumin/Globulin Ratio 10/21/2019 1.3  1.0 - 2.5 (calc) Final    Total Bilirubin 10/21/2019 0.4  0.2 - 1.2 mg/dL Final    Alkaline Phosphatase 10/21/2019 129* 40 - 115 U/L Final    AST 10/21/2019 11  10 - 35 U/L Final    ALT 10/21/2019 11  9 - 46 U/L Final    WBC 10/21/2019 4.3  3.8 - 10.8 Thousand/uL Final    RBC 10/21/2019 3.87* 4.20 - 5.80 Million/uL Final    Hemoglobin 10/21/2019 11.6* 13.2 - 17.1 g/dL Final    Hematocrit 10/21/2019 35.8* 38.5 - 50.0 % Final    Mean Corpuscular Volume 10/21/2019 92.5  80.0 - 100.0 fL Final    Mean Corpuscular Hemoglobin 10/21/2019 30.0  27.0 - 33.0 pg Final    Mean Corpuscular Hemoglobin Conc 10/21/2019 32.4  32.0 - 36.0 g/dL Final    RDW 10/21/2019 13.2  11.0 - 15.0 % Final    Platelets 10/21/2019 224  140 - 400 Thousand/uL Final    MPV 10/21/2019 10.9  7.5 - 12.5 fL Final    Neutrophils Absolute 10/21/2019 3,419  1,500 - 7,800 cells/uL Final    Lymph # 10/21/2019 520* 850 - 3,900 cells/uL Final    Mono # 10/21/2019 271  200 - 950 cells/uL Final    Eos # 10/21/2019 30  15 - 500 cells/uL Final    Baso # 10/21/2019 60  0 - 200 cells/uL Final    Neutrophils Relative 10/21/2019 79.5  % Final    Lymph% 10/21/2019 12.1  % Final    Mono% 10/21/2019 6.3  % Final    Eosinophil%  "10/21/2019 0.7  % Final    Basophil% 10/21/2019 1.4  % Final    Ferritin 10/21/2019 79  38 - 380 ng/mL Final   Office Visit on 10/09/2019   Component Date Value Ref Range Status    Hemoglobin A1C 10/09/2019 12.1  % Final       Past Medical History:   Diagnosis Date    Development disorder, mixed     Diabetes mellitus type II     Mental and behavioral problems with communication (including speech)      Past Surgical History:   Procedure Laterality Date    FOOT SURGERY       Family History   Problem Relation Age of Onset    Glaucoma Mother        Marital Status: Single  Alcohol History:  reports that he does not drink alcohol.  Tobacco History:  reports that he quit smoking about 21 years ago. He has never used smokeless tobacco.  Drug History:  reports that he does not use drugs.    Review of patient's allergies indicates:   Allergen Reactions    Codeine     Morphine sulfate Other (See Comments)     Other reaction(s): Unknown       Current Outpatient Medications:     ascorbic acid (VITAMIN C) 500 MG tablet, Take 500 mg by mouth once daily., Disp: , Rfl:     BD ULTRA-FINE MINI PEN NEEDLE 31 gauge x 3/16" Ndle, Inject 1 each into the skin 2 (two) times daily., Disp: 90 each, Rfl: 0    benztropine (COGENTIN) 1 MG tablet, Take 1 tablet (1 mg total) by mouth 2 (two) times daily., Disp: 180 tablet, Rfl: 2    bisacodyl (GENTLE LAXATIVE) 5 mg EC tablet, Take 5 mg by mouth daily as needed., Disp: , Rfl:     CONTOUR METER Misc, UTD, Disp: , Rfl: 0    CONTOUR TEST STRIPS Strp, TEST QID PRN, Disp: , Rfl: 5    ferrous gluconate (FERGON) 324 MG tablet, TAKE 1 TABLET BY MOUTH TWICE DAILY, Disp: 60 tablet, Rfl: 2    fluoxetine (PROZAC) 10 MG Tab, Take 10 mg by mouth once daily., Disp: , Rfl:     gabapentin (NEURONTIN) 300 MG capsule, Take 1 capsule (300 mg total) by mouth 2 (two) times daily., Disp: 180 capsule, Rfl: 1    hydrOXYzine pamoate (VISTARIL) 25 MG Cap, Take 1 capsule (25 mg total) by mouth 2 (two) " times daily. At 9am and 3pm, Disp: 180 capsule, Rfl: 1    LATUDA 80 mg Tab tablet, TK 1 T PO BID, Disp: , Rfl: 1    LINZESS 290 mcg Cap capsule, Take 1 capsule by mouth once daily., Disp: , Rfl:     metFORMIN (GLUCOPHAGE-XR) 500 MG 24 hr tablet, Take 2 tablets (1,000 mg total) by mouth 2 (two) times daily with meals., Disp: 360 tablet, Rfl: 1    ondansetron (ZOFRAN-ODT) 4 MG TbDL, Take 1 tablet by mouth once daily., Disp: , Rfl:     OXcarbazepine (TRILEPTAL) 300 MG Tab, Take 1 tablet (300 mg total) by mouth once daily., Disp: 90 tablet, Rfl: 1    potassium chloride SA (K-DUR,KLOR-CON) 20 MEQ tablet, Take 20 mEq by mouth once daily., Disp: , Rfl:     pravastatin (PRAVACHOL) 40 MG tablet, Take 1 tablet (40 mg total) by mouth once daily., Disp: 90 tablet, Rfl: 1    quetiapine (SEROQUEL) 300 MG Tab, Take by mouth., Disp: , Rfl:     ranitidine (ZANTAC) 150 MG tablet, Take 1 tablet (150 mg total) by mouth once daily., Disp: 90 tablet, Rfl: 1    TOUJEO SOLOSTAR U-300 INSULIN 300 unit/mL (1.5 mL) InPn pen, INJECT 30 UNITS SQ D, Disp: 9 mL, Rfl: 5    TRESIBA FLEXTOUCH U-100 100 unit/mL (3 mL) InPn, INJECT 30 UNITS UNDER THE SKIN ONCE DAILY, Disp: 15 mL, Rfl: 5    TRUEPLUS LANCETS 33 gauge Misc, 1 lancet by In Vitro route 4 (four) times daily., Disp: 200 each, Rfl: 0    Review of Systems   Constitutional: Negative for appetite change, fatigue, fever and unexpected weight change.   Respiratory: Negative for cough, chest tightness, shortness of breath and wheezing.    Cardiovascular: Negative for chest pain and leg swelling.   Gastrointestinal: Negative for abdominal pain, constipation, nausea and vomiting.        -heartburn   Genitourinary: Negative for decreased urine volume, difficulty urinating, dysuria and frequency.   Musculoskeletal: Negative for arthralgias, back pain, myalgias and neck pain.   Skin: Negative for rash.   Neurological: Negative for dizziness, weakness, numbness and headaches.   Hematological:  "Does not bruise/bleed easily.   Psychiatric/Behavioral: Negative for dysphoric mood, sleep disturbance and suicidal ideas. The patient is not nervous/anxious.    All other systems reviewed and are negative.         Objective:      Vitals:    02/18/20 1100   BP: (P) 118/78   Pulse: (P) 79   SpO2: (P) 96%   Weight: (P) 73.3 kg (161 lb 9.6 oz)   Height: (P) 5' 6" (1.676 m)     Body mass index is 26.08 kg/m² (pended).     Physical Exam   Constitutional: He is oriented to person, place, and time. He appears well-developed and well-nourished. No distress.   obese   HENT:   Head: Normocephalic and atraumatic.   Neck: Neck supple. No thyromegaly present.   Cardiovascular: Normal rate, regular rhythm and normal heart sounds. Exam reveals no friction rub.   No murmur heard.  Pulses:       Dorsalis pedis pulses are 2+ on the right side, and 2+ on the left side.        Posterior tibial pulses are 2+ on the right side, and 2+ on the left side.   Pulmonary/Chest: Effort normal and breath sounds normal. He has no wheezes. He has no rales.   Abdominal: Soft. Bowel sounds are normal. He exhibits no distension. There is no tenderness.   Musculoskeletal: He exhibits no edema.        Right foot: There is normal range of motion and no deformity.        Left foot: There is normal range of motion and no deformity.   Feet:   Right Foot:   Protective Sensation: 5 sites tested. 5 sites sensed.   Skin Integrity: Positive for callus. Negative for ulcer, blister, skin breakdown, erythema, warmth or dry skin.   Left Foot:   Protective Sensation: 5 sites tested. 5 sites sensed.   Skin Integrity: Positive for callus. Negative for ulcer, blister, skin breakdown, erythema, warmth or dry skin.   Lymphadenopathy:     He has no cervical adenopathy.   Neurological: He is alert and oriented to person, place, and time.   Skin: Skin is warm and dry. No rash noted.   Psychiatric: His behavior is normal. Judgment and thought content normal. His mood appears " anxious. His speech is not rapid and/or pressured. He is not agitated. Cognition and memory are normal. He is attentive.   Vitals reviewed.        Assessment:       1. DM type 2 with diabetic peripheral neuropathy    2. Gastroesophageal reflux disease, esophagitis presence not specified    3. Chronic idiopathic constipation    4. Poor dentition    5. Bladder outlet obstruction    6. Schizophrenia, unspecified type         Plan:       DM type 2 with diabetic peripheral neuropathy  Comments:  Uncontrolled. Encouraged ADA diet now living in apartment, regular BS checks.  Unsure if pt able to understand sliding scale fully. Will monitor A1c.  Orders:  -     POCT HEMOGLOBIN A1C    Gastroesophageal reflux disease, esophagitis presence not specified  Comments:  Controlled. Will continue to monitor.    Chronic idiopathic constipation  Comments:  Stable. Now on Linzess. Will continue to monitor symptoms.    Poor dentition  Comments:  Active. Pt encouraged to save money to get teeth fixed as he feels it is impairing his dietary intake.    Bladder outlet obstruction  Comments:  Chronic. To continue to follow with Urology for management.    Schizophrenia, unspecified type  Comments:  Active. To continue to psyche management through [facility] services.  Orders:  -     hydrOXYzine pamoate (VISTARIL) 25 MG Cap; Take 1 capsule (25 mg total) by mouth 2 (two) times daily. At 9am and 3pm  Dispense: 180 capsule; Refill: 1      Follow up in about 3 months (around 5/18/2020) for DM.

## 2020-03-13 DIAGNOSIS — E11.42 DM TYPE 2 WITH DIABETIC PERIPHERAL NEUROPATHY: ICD-10-CM

## 2020-03-13 DIAGNOSIS — D61.818 PANCYTOPENIA: ICD-10-CM

## 2020-03-13 DIAGNOSIS — D50.9 IRON DEFICIENCY ANEMIA, UNSPECIFIED: ICD-10-CM

## 2020-03-13 DIAGNOSIS — D69.59 OTHER SECONDARY THROMBOCYTOPENIA: ICD-10-CM

## 2020-03-13 DIAGNOSIS — D72.819 LEUKOPENIA, UNSPECIFIED TYPE: ICD-10-CM

## 2020-03-13 RX ORDER — PEN NEEDLE, DIABETIC 31 GX5/16"
1 NEEDLE, DISPOSABLE MISCELLANEOUS 2 TIMES DAILY
Qty: 90 EACH | Refills: 0 | Status: SHIPPED | OUTPATIENT
Start: 2020-03-13 | End: 2020-05-21 | Stop reason: SDUPTHER

## 2020-03-13 RX ORDER — FERROUS GLUCONATE 324(38)MG
1 TABLET ORAL 2 TIMES DAILY
Qty: 180 TABLET | Refills: 0 | Status: SHIPPED | OUTPATIENT
Start: 2020-03-13 | End: 2020-06-11

## 2020-03-13 NOTE — TELEPHONE ENCOUNTER
----- Message from Monae Bartlett sent at 3/13/2020  9:47 AM CDT -----  Refill on Pen Needles and iron pills sent to [facility] [address] [facility] # [phone]

## 2020-03-13 NOTE — TELEPHONE ENCOUNTER
The patient's prescription has been approved and sent to   [facility][id number] - [place], [place] - [address] [facility] [address]  [address] [facility] [address]  Phone: [phone] Fax: [phone]

## 2020-04-29 DIAGNOSIS — F20.9 SCHIZOPHRENIA, UNSPECIFIED TYPE: ICD-10-CM

## 2020-04-29 DIAGNOSIS — E11.42 DM TYPE 2 WITH DIABETIC PERIPHERAL NEUROPATHY: ICD-10-CM

## 2020-04-29 DIAGNOSIS — E78.5 HYPERLIPIDEMIA, UNSPECIFIED HYPERLIPIDEMIA TYPE: ICD-10-CM

## 2020-04-29 RX ORDER — PRAVASTATIN SODIUM 40 MG/1
40 TABLET ORAL DAILY
Qty: 90 TABLET | Refills: 1 | Status: SHIPPED | OUTPATIENT
Start: 2020-04-29 | End: 2020-09-21 | Stop reason: SDUPTHER

## 2020-04-29 RX ORDER — INSULIN GLARGINE 300 U/ML
INJECTION, SOLUTION SUBCUTANEOUS
Qty: 9 ML | Refills: 5 | Status: SHIPPED | OUTPATIENT
Start: 2020-04-29 | End: 2020-10-13 | Stop reason: SDUPTHER

## 2020-04-29 RX ORDER — BENZTROPINE MESYLATE 1 MG/1
1 TABLET ORAL 2 TIMES DAILY
Qty: 180 TABLET | Refills: 2 | Status: SHIPPED | OUTPATIENT
Start: 2020-04-29 | End: 2020-09-21 | Stop reason: SDUPTHER

## 2020-04-29 RX ORDER — INSULIN DEGLUDEC 100 U/ML
INJECTION, SOLUTION SUBCUTANEOUS
Qty: 15 ML | Refills: 5 | Status: CANCELLED | OUTPATIENT
Start: 2020-04-29

## 2020-04-29 RX ORDER — INSULIN GLARGINE 300 U/ML
INJECTION, SOLUTION SUBCUTANEOUS
Qty: 9 ML | Refills: 5 | Status: CANCELLED | OUTPATIENT
Start: 2020-04-29

## 2020-04-29 RX ORDER — GABAPENTIN 300 MG/1
300 CAPSULE ORAL 2 TIMES DAILY
Qty: 180 CAPSULE | Refills: 1 | Status: SHIPPED | OUTPATIENT
Start: 2020-04-29 | End: 2020-09-21 | Stop reason: SDUPTHER

## 2020-04-29 RX ORDER — METFORMIN HYDROCHLORIDE 500 MG/1
1000 TABLET, EXTENDED RELEASE ORAL 2 TIMES DAILY WITH MEALS
Qty: 360 TABLET | Refills: 1 | Status: SHIPPED | OUTPATIENT
Start: 2020-04-29 | End: 2020-09-21 | Stop reason: SDUPTHER

## 2020-04-29 NOTE — TELEPHONE ENCOUNTER
----- Message from Ted Varma sent at 4/29/2020  2:31 PM CDT -----  Metfomin, rantidine gabapentin and other medical meds that are needed   [facility] yessenia sullivan [facility] [phone]

## 2020-05-12 DIAGNOSIS — E11.42 DM TYPE 2 WITH DIABETIC PERIPHERAL NEUROPATHY: Primary | ICD-10-CM

## 2020-05-12 RX ORDER — BLOOD SUGAR DIAGNOSTIC
1 STRIP MISCELLANEOUS 4 TIMES DAILY PRN
Qty: 200 EACH | Refills: 5 | Status: SHIPPED | OUTPATIENT
Start: 2020-05-12 | End: 2020-09-21 | Stop reason: SDUPTHER

## 2020-05-12 NOTE — TELEPHONE ENCOUNTER
----- Message from Ted Varma sent at 5/12/2020  9:31 AM CDT -----  Refills on test strips   Pharm [facility] [address]   Pt [phone]

## 2020-05-13 DIAGNOSIS — E11.42 DM TYPE 2 WITH DIABETIC PERIPHERAL NEUROPATHY: Primary | ICD-10-CM

## 2020-05-13 NOTE — PROGRESS NOTES
Adding labs for upcoming visit. Pt also asked to bring in bs log for visit as he is having trouble with bs.

## 2020-05-19 LAB
ALBUMIN/CREAT UR: 18 MCG/MG CREAT
CHOLEST SERPL-MCNC: 147 MG/DL
CHOLEST/HDLC SERPL: 2.5 (CALC)
CREAT UR-MCNC: 34 MG/DL (ref 20–320)
HBA1C MFR BLD: 9.4 % OF TOTAL HGB
HDLC SERPL-MCNC: 59 MG/DL
LDLC SERPL CALC-MCNC: 67 MG/DL (CALC)
MICROALBUMIN UR-MCNC: 0.6 MG/DL
NONHDLC SERPL-MCNC: 88 MG/DL (CALC)
TRIGL SERPL-MCNC: 126 MG/DL
TSH SERPL-ACNC: 2 MIU/L (ref 0.4–4.5)

## 2020-05-21 ENCOUNTER — OFFICE VISIT (OUTPATIENT)
Dept: FAMILY MEDICINE | Facility: CLINIC | Age: 60
End: 2020-05-21
Payer: MEDICARE

## 2020-05-21 VITALS
HEART RATE: 76 BPM | DIASTOLIC BLOOD PRESSURE: 70 MMHG | WEIGHT: 173 LBS | TEMPERATURE: 99 F | OXYGEN SATURATION: 97 % | BODY MASS INDEX: 27.8 KG/M2 | SYSTOLIC BLOOD PRESSURE: 122 MMHG | HEIGHT: 66 IN

## 2020-05-21 DIAGNOSIS — K21.9 GASTROESOPHAGEAL REFLUX DISEASE WITHOUT ESOPHAGITIS: ICD-10-CM

## 2020-05-21 DIAGNOSIS — F20.9 SCHIZOPHRENIA, UNSPECIFIED TYPE: ICD-10-CM

## 2020-05-21 DIAGNOSIS — E78.2 MIXED HYPERLIPIDEMIA: ICD-10-CM

## 2020-05-21 DIAGNOSIS — E11.42 DM TYPE 2 WITH DIABETIC PERIPHERAL NEUROPATHY: Primary | ICD-10-CM

## 2020-05-21 DIAGNOSIS — K59.04 CHRONIC IDIOPATHIC CONSTIPATION: ICD-10-CM

## 2020-05-21 PROCEDURE — 99214 OFFICE O/P EST MOD 30 MIN: CPT | Mod: S$GLB,,, | Performed by: FAMILY MEDICINE

## 2020-05-21 PROCEDURE — 99214 PR OFFICE/OUTPT VISIT, EST, LEVL IV, 30-39 MIN: ICD-10-PCS | Mod: S$GLB,,, | Performed by: FAMILY MEDICINE

## 2020-05-21 RX ORDER — PEN NEEDLE, DIABETIC 31 GX5/16"
1 NEEDLE, DISPOSABLE MISCELLANEOUS 4 TIMES DAILY
Qty: 150 EACH | Refills: 2 | Status: SHIPPED | OUTPATIENT
Start: 2020-05-21 | End: 2020-08-19

## 2020-05-21 RX ORDER — PANTOPRAZOLE SODIUM 20 MG/1
20 TABLET, DELAYED RELEASE ORAL DAILY
Qty: 30 TABLET | Refills: 5 | Status: SHIPPED | OUTPATIENT
Start: 2020-05-21 | End: 2021-01-07 | Stop reason: SDUPTHER

## 2020-05-21 NOTE — PROGRESS NOTES
SUBJECTIVE:    Patient ID: Matthieu Jovle is a 60 y.o. male.    Chief Complaint: Diabetes (follow up) and Bottles not brought (Medication list provided)    Pt here to checkup on chronic conditions.    Pt has not been working since the COVID pandemic, was collecting baskets in grocer parking lot.    No longer having issues with constipation since being on linzess  (Dr. Danielle). Has to repeat cscope due to inadequate prep.  Has had it done 3 times.    Pt gained 18 pounds since last visit.  Admits to eating 3 meals a day.  Snacking once day, potatoe chips.  Drinking some water and crystal light.  Takes blood sugar 3 times a day.      Has been having heartburn lately. Denies heartburn, no longer taking protonix.    HbA1c is 9.4%, which is improved. Did not bring in a log today.     Continue to see Dr. Pacheco, just seen last month, still doing in/out cath.    Had labs done, LDL is 67, TSH is 2.0      Orders Only on 05/13/2020   Component Date Value Ref Range Status    Cholesterol 05/18/2020 147  <200 mg/dL Final    HDL 05/18/2020 59  > OR = 40 mg/dL Final    Triglycerides 05/18/2020 126  <150 mg/dL Final    LDL Cholesterol 05/18/2020 67  mg/dL (calc) Final    Hdl/Cholesterol Ratio 05/18/2020 2.5  <5.0 (calc) Final    Non HDL Chol. (LDL+VLDL) 05/18/2020 88  <130 mg/dL (calc) Final    Hemoglobin A1C 05/18/2020 9.4* <5.7 % of total Hgb Final    Creatinine, Random Ur 05/18/2020 34  20 - 320 mg/dL Final    Microalb, Ur 05/18/2020 0.6  See Note: mg/dL Final    Microalb Creat Ratio 05/18/2020 18  <30 mcg/mg creat Final    TSH w/reflex to FT4 05/18/2020 2.00  0.40 - 4.50 mIU/L Final   Admission on 03/09/2020, Discharged on 03/09/2020   Component Date Value Ref Range Status    POC WBC 03/09/2020 8.6  3.9 - 12.7 K/uL Final    POC GRA% 03/09/2020 88.1* 38.0 - 73.0 % Final    POC MID% 03/09/2020 3.0* 4.0 - 15.0 % Final    POC LYM% 03/09/2020 8.9* 18.0 - 48.0 % Final    POC Gran# 03/09/2020 7.6  1.8 - 7.7 K/uL  Final    POC MID# 03/09/2020 0.3  0.3 - 1.0 K/uL Final    POC LYM# 03/09/2020 0.7* 1.0 - 4.8 K/uL Final    POC RBC 03/09/2020 4.21* 4.60 - 6.20 M/uL Final    POC HGB 03/09/2020 12.4* 14.0 - 18.0 g/dL Final    POC MCV 03/09/2020 83.0  82.0 - 98.0 fl Final    POC HCT 03/09/2020 34.9* 40.0 - 54.0 % Final    POC MCH 03/09/2020 29.6  27.0 - 31.0 pg Final    POC MCHC 03/09/2020 35.7  32.0 - 36.0 g/dL Final    POC RDW% 03/09/2020 12.1  11.5 - 14.5 % Final    POC PLT 03/09/2020 269  150 - 350 K/uL Final    POC MPV 03/09/2020 7.8* 9.2 - 12.9 fl Final    POC Sodium 03/09/2020 137  128 - 145 mmol/L Final    POC Potassium 03/09/2020 4.9  3.6 - 5.1 mmol/L Final    POC Chloride 03/09/2020 93* 98 - 108 mmol/L Final    POC CO2 03/09/2020 30  18 - 33 mmol/L Final    POC Glucose 03/09/2020 547* 73 - 118 mg/dL Final    POC BUN 03/09/2020 21  7 - 22 mg/dL Final    POC Creatinine 03/09/2020 1.1  0.6 - 1.2 mg/dL Final    Calcium, POC 03/09/2020 9.4  8.0 - 10.3 mg/dL Final    Albumin, POC 03/09/2020 3.8  3.3 - 5.5 g/dL Final    POC ALT 03/09/2020 14  10 - 47 U/L Final    POC AST 03/09/2020 18  11 - 38 U/L Final    Alkaline Phosphatase, POC 03/09/2020 169* 53 - 128 U/L Final    POC TOTAL PROTEIN 03/09/2020 8.1  6.4 - 8.1 g/dL Final    POC TOTAL BILIRUBIN 03/09/2020 0.5  0.2 - 1.6 mg/dL Final    POC eGFR 03/09/2020 >60  61 - 2,000 mL/min Final    POC Hemolysis 03/09/2020 0   Final    Specimen Type 03/09/2020 BLNK   Final    POCT Glucose 03/09/2020 514* 70 - 110 mg/dL Final   Admission on 12/23/2019, Discharged on 12/23/2019   Component Date Value Ref Range Status    POC Molecular Influenza A Ag 12/23/2019 Negative  Negative, Not Reported Final    POC Molecular Influenza B Ag 12/23/2019 Negative  Negative, Not Reported Final     Acceptable 12/23/2019 Yes   Final       Past Medical History:   Diagnosis Date    Development disorder, mixed     Diabetes mellitus type II     Mental and behavioral  "problems with communication (including speech)      Past Surgical History:   Procedure Laterality Date    FOOT SURGERY       Family History   Problem Relation Age of Onset    Glaucoma Mother        Marital Status: Single  Alcohol History:  reports that he does not drink alcohol.  Tobacco History:  reports that he quit smoking about 22 years ago. He has never used smokeless tobacco.  Drug History:  reports that he does not use drugs.    Review of patient's allergies indicates:   Allergen Reactions    Codeine     Morphine sulfate Other (See Comments)     Other reaction(s): Unknown       Current Outpatient Medications:     BD ULTRA-FINE MINI PEN NEEDLE 31 gauge x 3/16" Ndle, Inject 1 each into the skin 4 (four) times daily., Disp: 150 each, Rfl: 2    benztropine (COGENTIN) 1 MG tablet, Take 1 tablet (1 mg total) by mouth 2 (two) times daily., Disp: 180 tablet, Rfl: 2    CONTOUR METER Misc, UTD, Disp: , Rfl: 0    CONTOUR TEST STRIPS Strp, 1 each by In Vitro route 4 (four) times daily as needed., Disp: 200 each, Rfl: 5    ferrous gluconate (FERGON) 324 MG tablet, Take 1 tablet (324 mg total) by mouth 2 (two) times daily., Disp: 180 tablet, Rfl: 0    FLUoxetine 40 MG capsule, Take 40 mg by mouth once daily. , Disp: , Rfl:     gabapentin (NEURONTIN) 300 MG capsule, Take 1 capsule (300 mg total) by mouth 2 (two) times daily., Disp: 180 capsule, Rfl: 1    LATUDA 80 mg Tab tablet, TK 1 T PO BID, Disp: , Rfl: 1    LINZESS 290 mcg Cap capsule, Take 1 capsule by mouth once daily., Disp: , Rfl:     metFORMIN (GLUCOPHAGE-XR) 500 MG XR 24hr tablet, Take 2 tablets (1,000 mg total) by mouth 2 (two) times daily with meals., Disp: 360 tablet, Rfl: 1    ondansetron (ZOFRAN-ODT) 4 MG TbDL, Take 1 tablet by mouth once daily., Disp: , Rfl:     OXcarbazepine (TRILEPTAL) 300 MG Tab, Take 1 tablet (300 mg total) by mouth once daily., Disp: 90 tablet, Rfl: 1    potassium chloride SA (K-DUR,KLOR-CON) 20 MEQ tablet, Take 20 mEq " "by mouth once daily., Disp: , Rfl:     pravastatin (PRAVACHOL) 40 MG tablet, Take 1 tablet (40 mg total) by mouth once daily., Disp: 90 tablet, Rfl: 1    quetiapine (SEROQUEL) 300 MG Tab, Take by mouth., Disp: , Rfl:     TOUJEO SOLOSTAR U-300 INSULIN 300 unit/mL (1.5 mL) InPn pen, INJECT 30 UNITS SQ D, Disp: 9 mL, Rfl: 5    TRUEPLUS LANCETS 33 gauge Misc, 1 lancet by In Vitro route 4 (four) times daily., Disp: 200 each, Rfl: 0    pantoprazole (PROTONIX) 20 MG tablet, Take 1 tablet (20 mg total) by mouth once daily., Disp: 30 tablet, Rfl: 5    Review of Systems   Constitutional: Negative for appetite change, fatigue, fever and unexpected weight change.   Respiratory: Negative for cough, chest tightness, shortness of breath and wheezing.    Cardiovascular: Negative for chest pain and leg swelling.   Gastrointestinal: Negative for abdominal pain, constipation, nausea and vomiting.        +heartburn   Genitourinary: Negative for decreased urine volume, difficulty urinating, dysuria and frequency.   Musculoskeletal: Negative for arthralgias, back pain, myalgias and neck pain.   Skin: Negative for rash.   Neurological: Negative for dizziness, weakness, numbness and headaches.   Hematological: Does not bruise/bleed easily.   Psychiatric/Behavioral: Negative for dysphoric mood, sleep disturbance and suicidal ideas. The patient is not nervous/anxious.    All other systems reviewed and are negative.         Objective:      Vitals:    05/21/20 1047   BP: 122/70   Pulse: 76   Temp: 98.6 °F (37 °C)   SpO2: 97%   Weight: 78.5 kg (173 lb)   Height: 5' 6" (1.676 m)     Wt Readings from Last 3 Encounters:   05/21/20 78.5 kg (173 lb)   03/09/20 70.7 kg (155 lb 13.8 oz)   02/18/20 (P) 73.3 kg (161 lb 9.6 oz)         Body mass index is 27.92 kg/m².       Physical Exam   Constitutional: He is oriented to person, place, and time. He appears well-developed and well-nourished. No distress.   obese   HENT:   Head: Normocephalic and " "atraumatic.   Neck: Neck supple. No thyromegaly present.   Cardiovascular: Normal rate, regular rhythm and normal heart sounds. Exam reveals no friction rub.   No murmur heard.  Pulmonary/Chest: Effort normal and breath sounds normal. He has no wheezes. He has no rales.   Abdominal: Soft. Bowel sounds are normal. He exhibits no distension. There is no tenderness.   Musculoskeletal: He exhibits no edema.   Lymphadenopathy:     He has no cervical adenopathy.   Neurological: He is alert and oriented to person, place, and time.   Skin: Skin is warm and dry. No rash noted.   Psychiatric: His behavior is normal. Judgment and thought content normal. His mood appears not anxious. His speech is not rapid and/or pressured. He is not agitated. Cognition and memory are normal. He is attentive.   Vitals reviewed.        Assessment:       1. DM type 2 with diabetic peripheral neuropathy    2. Gastroesophageal reflux disease without esophagitis    3. Chronic idiopathic constipation    4. Schizophrenia, unspecified type    5. Mixed hyperlipidemia         Plan:       DM type 2 with diabetic peripheral neuropathy  Comments:  Improved. To continue ADA diet, given BS log to retun for testing 3-4 times daily, encouraged regular exercise. Will continue to monitor A1c.   Orders:  -     BD ULTRA-FINE MINI PEN NEEDLE 31 gauge x 3/16" Ndle; Inject 1 each into the skin 4 (four) times daily.  Dispense: 150 each; Refill: 2    Gastroesophageal reflux disease without esophagitis  Comments:  Symptomatic. Will resume pantoprazele.  Will reassess symptoms.  Orders:  -     pantoprazole (PROTONIX) 20 MG tablet; Take 1 tablet (20 mg total) by mouth once daily.  Dispense: 30 tablet; Refill: 5    Chronic idiopathic constipation  Comments:  Controlled. To continue Linzess. Will continue to monitor symptoms    Schizophrenia, unspecified type  Comments:  Controlled. Continue to IOP.    Mixed hyperlipidemia  Comments:  Controlled. LDL 67. To continue " statin.    Other  Lab results discussed and reviewed with patient.  Given blood sugar logs.    Follow up in about 3 months (around 8/21/2020) for DM, GERD, Constipation, Schizophrenia.

## 2020-06-03 NOTE — PROGRESS NOTES
"   [facility]ematology/[facility]  PROGRESS NOTE - Telemedicine Visit      Subjective:       Patient ID:   NAME: Matthieu Jovel : 1960     60 y.o. male    Referring Doc: Efren  Other Physicians:    Chief Complaint:  Iron defic anemia f/u    History of Present Illness:     Patient is being seen today via a telemedicine follow-up visit in lieu of a normal in-person visit due to the recent COVID19 outbreak. The patient is currently located at home. This visit type is a virtual visit with synchronous audio without video.      The patient is on with his sister, Ita Wiley. He is resident of [facility]. He denies any new issues; no SOB, HA's or N/V.     He previously broke right ankle and has since fully healed.    Discussed Covid19 precautions        ROS:   GEN: normal without any fever, night sweats or weight loss  HEENT: normal with no HA's, sore throat, stiff neck, changes in vision  CV: normal with no CP, SOB, PND, PRADO or orthopnea  PULM: normal with no SOB, cough, hemoptysis, sputum or pleuritic pain  GI: normal with no abdominal pain, nausea, vomiting, constipation, diarrhea, melanotic stools, BRBPR, or hematemesis  : normal with no hematuria, dysuria  BREAST: normal with no mass, discharge, pain  SKIN: normal with no rash, erythema, bruising, or swelling    Allergies:  Review of patient's allergies indicates:   Allergen Reactions    Codeine        Medications:    Current Outpatient Medications:     BD ULTRA-FINE MINI PEN NEEDLE 31 gauge x 3/16" Ndle, Inject 1 each into the skin 4 (four) times daily., Disp: 150 each, Rfl: 2    benztropine (COGENTIN) 1 MG tablet, Take 1 tablet (1 mg total) by mouth 2 (two) times daily., Disp: 180 tablet, Rfl: 2    CONTOUR METER Misc, UTD, Disp: , Rfl: 0    CONTOUR TEST STRIPS Strp, 1 each by In Vitro route 4 (four) times daily as needed., Disp: 200 each, Rfl: 5    ferrous gluconate (FERGON) 324 MG tablet, Take 1 tablet (324 mg total) by mouth 2 (two) times " daily., Disp: 180 tablet, Rfl: 0    FLUoxetine 40 MG capsule, Take 40 mg by mouth once daily. , Disp: , Rfl:     gabapentin (NEURONTIN) 300 MG capsule, Take 1 capsule (300 mg total) by mouth 2 (two) times daily., Disp: 180 capsule, Rfl: 1    LATUDA 80 mg Tab tablet, TK 1 T PO BID, Disp: , Rfl: 1    LINZESS 290 mcg Cap capsule, Take 1 capsule by mouth once daily., Disp: , Rfl:     metFORMIN (GLUCOPHAGE-XR) 500 MG XR 24hr tablet, Take 2 tablets (1,000 mg total) by mouth 2 (two) times daily with meals., Disp: 360 tablet, Rfl: 1    ondansetron (ZOFRAN-ODT) 4 MG TbDL, Take 1 tablet by mouth once daily., Disp: , Rfl:     OXcarbazepine (TRILEPTAL) 300 MG Tab, Take 1 tablet (300 mg total) by mouth once daily., Disp: 90 tablet, Rfl: 1    pantoprazole (PROTONIX) 20 MG tablet, Take 1 tablet (20 mg total) by mouth once daily., Disp: 30 tablet, Rfl: 5    potassium chloride SA (K-DUR,KLOR-CON) 20 MEQ tablet, Take 20 mEq by mouth once daily., Disp: , Rfl:     pravastatin (PRAVACHOL) 40 MG tablet, Take 1 tablet (40 mg total) by mouth once daily., Disp: 90 tablet, Rfl: 1    quetiapine (SEROQUEL) 300 MG Tab, Take by mouth., Disp: , Rfl:     TOUJEO SOLOSTAR U-300 INSULIN 300 unit/mL (1.5 mL) InPn pen, INJECT 30 UNITS SQ D, Disp: 9 mL, Rfl: 5    TRUEPLUS LANCETS 33 gauge Misc, 1 lancet by In Vitro route 4 (four) times daily., Disp: 200 each, Rfl: 0    PMHx/PSHx Updates:  See patient's last visit with me on 10/22/2019  See H&P on 8/26/2013        Pathology:  none          Objective:     Vitals:  afebrile    Physical Examination:   GEN: no apparent distress, comfortable; AAOx3  HEAD: atraumatic and normocephalic  EYES: no conjunctival pallor or muddiness, no icterus; normal pupil reaction to ambient light  ENT: OMM, no pharyngeal erythema, external bilateral ears WNL; no visible thrush or ulcers  NECK: no masses or swelling, trachea midline, no visible LAD/LN's   CV: no palpitations; no pedal edema; no noticeable JVD or neck  vein distension  CHEST: Normal respiratory effort; chest wall breath movements symmetrical; no audible wheezing  ABDOM: non-distended; no bloating  MUSC/Skeletal: ROM normal; joints visibly normal; no deformities or arthropathy  EXTREM: no clubbing, cyanosis, inflammation or swelling  SKIN: no rashes, lesions, ulcers, petechiae or subcutaneous nodules  : no patel  NEURO: moving all 4 extremities; AAOx3; no tremors  PSYCH: normal mood, affect and behavior  LYMPH: no visible LN's or LAD            Labs:     6/3/2020  Pending    Lab Results   Component Value Date    IRON 92 06/03/2020    TIBC 310 06/03/2020    FERRITIN 35 (L) 06/03/2020     CMP  Sodium   Date Value Ref Range Status   06/03/2020 140 135 - 146 mmol/L Final   10/22/2018 137 134 - 144 mmol/L      Potassium   Date Value Ref Range Status   06/03/2020 4.4 3.5 - 5.3 mmol/L Final     Chloride   Date Value Ref Range Status   06/03/2020 102 98 - 110 mmol/L Final   10/22/2018 105 98 - 110 mmol/L      CO2   Date Value Ref Range Status   06/03/2020 27 20 - 32 mmol/L Final     Glucose   Date Value Ref Range Status   06/03/2020 47 (L) 65 - 99 mg/dL Final     Comment:                   Fasting reference interval        10/22/2018 356 (H) 70 - 99 mg/dL      BUN, Bld   Date Value Ref Range Status   06/03/2020 19 7 - 25 mg/dL Final     Creatinine   Date Value Ref Range Status   06/03/2020 1.28 0.70 - 1.33 mg/dL Final     Comment:     For patients >49 years of age, the reference limit  for Creatinine is approximately 13% higher for people  identified as -American.        10/22/2018 1.13 0.60 - 1.40 mg/dL      Calcium   Date Value Ref Range Status   06/03/2020 9.7 8.6 - 10.3 mg/dL Final     Total Protein   Date Value Ref Range Status   06/03/2020 7.0 6.1 - 8.1 g/dL Final     Albumin   Date Value Ref Range Status   06/03/2020 4.1 3.6 - 5.1 g/dL Final   10/22/2018 3.7 3.1 - 4.7 g/dL      Total Bilirubin   Date Value Ref Range Status   06/03/2020 0.3 0.2 - 1.2 mg/dL  Final     Alkaline Phosphatase   Date Value Ref Range Status   06/03/2020 133 35 - 144 U/L Final     AST   Date Value Ref Range Status   06/03/2020 13 10 - 35 U/L Final     ALT   Date Value Ref Range Status   06/03/2020 14 9 - 46 U/L Final     eGFR if    Date Value Ref Range Status   06/03/2020 71 > OR = 60 mL/min/1.73m2 Final     eGFR if non    Date Value Ref Range Status   06/03/2020 61 > OR = 60 mL/min/1.73m2 Final                   Radiology/Diagnostic Studies:    No results found.    I have reviewed all available lab results and radiology reports.    Assessment/Plan:   (1) 60 y.o. male with diagnosis of psychiatric and mental issues who is a resident of [facility]    (2) Mild chronic pancytopenia suspected secondary to antipsychotic meds  - he had some recent labs done and CBC was adequate but hgb was a little lower this time  - hgb is currently pending  - he is supposed to be on oral iron with fergon bid    (3) Iron defic anemia in past    (4) Noncompliance with follow-up and labs    1. Pancytopenia     2. Iron deficiency anemia, unspecified iron deficiency anemia type     3. Leukopenia, unspecified type     4. Other secondary thrombocytopenia           PLAN:  1. Check labs monthly for now; continue oral iron bid (encouraged compliance) - awaiting results of CBC from yesterday  2. F/u with PCP  3. encouraged compliance  4.  RTC in 3-4 months    Fax note to Efren    Total Time spent on patient:    I spent over 15 mins of time with the patient. Reviewed results of the recently ordered labs, tests, reports and studies; made directives with regards to the results. Over half of this time was spent couseling and coordinating care, making treatment and analytical decisions; ordering necessary labs, tests and studies; and discussing treatment options and setting up treatment plan(s) if indicated.          Discussion:       COVID-19 Discussion:    I had long discussion with  patient and any applicable family about the COVID-19 coronavirus epidemic and the recommended precautions with regard to cancer and/or hematology patients. I have re-iterated the CDC recommendations for adequate hand washing, use of hand -like products, and coughing into elbow, etc. In addition, especially for our patients who are on chemotherapy and/or our otherwise immunocompromised patients, I have recommended avoidance of crowds, including movie theaters, restaurants, churches, etc. I have recommended avoidance of any sick or symptomatic family members and/or friends. I have also recommended avoidance of any raw and unwashed food products, and general avoidance of food items that have not been prepared by themselves. The patient has been asked to call us immediately with any symptom developments, issues, questions or other general concerns.       Telemedicine Statement:    The patient acknowledged and agreed to the audio/video encounter and the patient who is being provided medical services by telemedicine is:  (1) informed of the relationship between the physician and patient and the respective role of any other health care provider with respect to management of the patient; and (2) notified that he or she may decline to receive medical services by telemedicine and may withdraw from such care at any time.    I have explained all of the above in detail and the patient understands all of the current recommendation(s). I have answered all of their questions to the best of my ability and to their complete satisfaction.   The patient is to continue with the current management plan.            Electronically signed by Wing Arechiga MD

## 2020-06-04 ENCOUNTER — OFFICE VISIT (OUTPATIENT)
Dept: HEMATOLOGY/ONCOLOGY | Facility: CLINIC | Age: 60
End: 2020-06-04
Payer: MEDICARE

## 2020-06-04 DIAGNOSIS — D50.9 IRON DEFICIENCY ANEMIA, UNSPECIFIED IRON DEFICIENCY ANEMIA TYPE: ICD-10-CM

## 2020-06-04 DIAGNOSIS — D61.818 PANCYTOPENIA: Primary | ICD-10-CM

## 2020-06-04 DIAGNOSIS — D72.819 LEUKOPENIA, UNSPECIFIED TYPE: ICD-10-CM

## 2020-06-04 DIAGNOSIS — D69.59 OTHER SECONDARY THROMBOCYTOPENIA: ICD-10-CM

## 2020-06-04 PROCEDURE — 99213 PR OFFICE/OUTPT VISIT, EST, LEVL III, 20-29 MIN: ICD-10-PCS | Mod: 95,,, | Performed by: INTERNAL MEDICINE

## 2020-06-04 PROCEDURE — 99213 OFFICE O/P EST LOW 20 MIN: CPT | Mod: 95,,, | Performed by: INTERNAL MEDICINE

## 2020-06-05 ENCOUNTER — TELEPHONE (OUTPATIENT)
Dept: HEMATOLOGY/ONCOLOGY | Facility: CLINIC | Age: 60
End: 2020-06-05

## 2020-06-05 NOTE — TELEPHONE ENCOUNTER
----- Message from Elizabeth Galvez, Patient Care Assistant sent at 6/4/2020  4:23 PM CDT -----  Patient Sister (Ita) called in for a prescription refill=  Fergon 324 mg . She can be reached at [phone]

## 2020-06-05 NOTE — TELEPHONE ENCOUNTER
Called the patient's sister Ita and she instructed me that he needs his Fergon re-filled.  I called into [facility] in [place].  Fergon 324 mg 1 po bid #180 with 1 refill.

## 2020-06-08 ENCOUNTER — TELEPHONE (OUTPATIENT)
Dept: HEMATOLOGY/ONCOLOGY | Facility: CLINIC | Age: 60
End: 2020-06-08

## 2020-06-08 NOTE — TELEPHONE ENCOUNTER
----- Message from Wing Arechiga MD sent at 6/8/2020  7:32 AM CDT -----  Make sure he is on oral iron. Find out what iron it is. If it is not ICAR-C, change to ICAR-C. If he is on ICAR-C already then increase to bid

## 2020-07-06 ENCOUNTER — PES CALL (OUTPATIENT)
Dept: ADMINISTRATIVE | Facility: CLINIC | Age: 60
End: 2020-07-06

## 2020-09-21 ENCOUNTER — OFFICE VISIT (OUTPATIENT)
Dept: FAMILY MEDICINE | Facility: CLINIC | Age: 60
End: 2020-09-21
Payer: MEDICARE

## 2020-09-21 VITALS
DIASTOLIC BLOOD PRESSURE: 66 MMHG | HEIGHT: 66 IN | HEART RATE: 72 BPM | TEMPERATURE: 98 F | WEIGHT: 175.63 LBS | OXYGEN SATURATION: 96 % | SYSTOLIC BLOOD PRESSURE: 114 MMHG | BODY MASS INDEX: 28.22 KG/M2

## 2020-09-21 DIAGNOSIS — K59.04 CHRONIC IDIOPATHIC CONSTIPATION: ICD-10-CM

## 2020-09-21 DIAGNOSIS — E78.2 MIXED HYPERLIPIDEMIA: ICD-10-CM

## 2020-09-21 DIAGNOSIS — N32.0 BLADDER OUTLET OBSTRUCTION: ICD-10-CM

## 2020-09-21 DIAGNOSIS — K08.9 POOR DENTITION: ICD-10-CM

## 2020-09-21 DIAGNOSIS — F20.9 SCHIZOPHRENIA, UNSPECIFIED TYPE: ICD-10-CM

## 2020-09-21 DIAGNOSIS — E11.42 DM TYPE 2 WITH DIABETIC PERIPHERAL NEUROPATHY: Primary | ICD-10-CM

## 2020-09-21 DIAGNOSIS — K21.9 GASTROESOPHAGEAL REFLUX DISEASE WITHOUT ESOPHAGITIS: ICD-10-CM

## 2020-09-21 LAB — HBA1C MFR BLD: 8.9 %

## 2020-09-21 PROCEDURE — 83036 HEMOGLOBIN GLYCOSYLATED A1C: CPT | Mod: QW,,, | Performed by: FAMILY MEDICINE

## 2020-09-21 PROCEDURE — 83036 POCT HEMOGLOBIN A1C: ICD-10-PCS | Mod: QW,,, | Performed by: FAMILY MEDICINE

## 2020-09-21 PROCEDURE — 99214 OFFICE O/P EST MOD 30 MIN: CPT | Mod: S$GLB,,, | Performed by: FAMILY MEDICINE

## 2020-09-21 PROCEDURE — 99214 PR OFFICE/OUTPT VISIT, EST, LEVL IV, 30-39 MIN: ICD-10-PCS | Mod: S$GLB,,, | Performed by: FAMILY MEDICINE

## 2020-09-21 RX ORDER — METFORMIN HYDROCHLORIDE 500 MG/1
1000 TABLET, EXTENDED RELEASE ORAL 2 TIMES DAILY WITH MEALS
Qty: 360 TABLET | Refills: 1 | Status: SHIPPED | OUTPATIENT
Start: 2020-09-21 | End: 2021-03-23 | Stop reason: SDUPTHER

## 2020-09-21 RX ORDER — PRAVASTATIN SODIUM 40 MG/1
40 TABLET ORAL DAILY
Qty: 90 TABLET | Refills: 1 | Status: SHIPPED | OUTPATIENT
Start: 2020-09-21 | End: 2021-03-23 | Stop reason: SDUPTHER

## 2020-09-21 RX ORDER — BENZTROPINE MESYLATE 1 MG/1
1 TABLET ORAL 2 TIMES DAILY
Qty: 180 TABLET | Refills: 2 | Status: SHIPPED | OUTPATIENT
Start: 2020-09-21 | End: 2021-03-23 | Stop reason: SDUPTHER

## 2020-09-21 RX ORDER — GABAPENTIN 300 MG/1
300 CAPSULE ORAL 2 TIMES DAILY
Qty: 180 CAPSULE | Refills: 1 | Status: SHIPPED | OUTPATIENT
Start: 2020-09-21 | End: 2021-03-23 | Stop reason: SDUPTHER

## 2020-09-21 RX ORDER — BLOOD SUGAR DIAGNOSTIC
1 STRIP MISCELLANEOUS 4 TIMES DAILY PRN
Qty: 200 EACH | Refills: 5 | Status: SHIPPED | OUTPATIENT
Start: 2020-09-21 | End: 2021-01-21

## 2020-09-21 NOTE — PROGRESS NOTES
SUBJECTIVE:    Patient ID: Matthieu Jovel is a 60 y.o. male.    Chief Complaint: Diabetes (follow up) and Bottles not brought (List provided)    Pt here to checkup on chronic conditions.    Pt has been back to work for about 1 month or so.  Drinking and eating things he isn't supposed to be. No weight gain since last visit.  Eating 3 times a day, snacking as well. Taking Toujeo 25-30 units at night.     Constipation is doing ok is linzess. Also thinks his coffee makes him go. (Dr. Danielle). Has had cscope 4 times and has had inadequate prep.    Has been having heartburn lately. Denies heartburn, no longer taking protonix.    HbA1c is 8.9%, which is improved. Brought in log today.     Continue to see Dr. Pacheco, just seen last month, still doing in/out cath.    ---------------------------------------------------------------------------      Orders Only on 05/13/2020   Component Date Value Ref Range Status    Cholesterol 05/18/2020 147  <200 mg/dL Final    HDL 05/18/2020 59  > OR = 40 mg/dL Final    Triglycerides 05/18/2020 126  <150 mg/dL Final    LDL Cholesterol 05/18/2020 67  mg/dL (calc) Final    Hdl/Cholesterol Ratio 05/18/2020 2.5  <5.0 (calc) Final    Non HDL Chol. (LDL+VLDL) 05/18/2020 88  <130 mg/dL (calc) Final    Hemoglobin A1C 05/18/2020 9.4* <5.7 % of total Hgb Final    Creatinine, Random Ur 05/18/2020 34  20 - 320 mg/dL Final    Microalb, Ur 05/18/2020 0.6  See Note: mg/dL Final    Microalb Creat Ratio 05/18/2020 18  <30 mcg/mg creat Final    TSH w/reflex to FT4 05/18/2020 2.00  0.40 - 4.50 mIU/L Final       Past Medical History:   Diagnosis Date    Development disorder, mixed     Diabetes mellitus type II     Mental and behavioral problems with communication (including speech)      Social History     Socioeconomic History    Marital status: Single     Spouse name: Not on file    Number of children: Not on file    Years of education: Not on file    Highest education level: Not on  file   Occupational History    Not on file   Social Needs    Financial resource strain: Not on file    Food insecurity     Worry: Not on file     Inability: Not on file    Transportation needs     Medical: Not on file     Non-medical: Not on file   Tobacco Use    Smoking status: Former Smoker     Quit date: 1998     Years since quittin.4    Smokeless tobacco: Never Used   Substance and Sexual Activity    Alcohol use: No    Drug use: No    Sexual activity: Not on file   Lifestyle    Physical activity     Days per week: Not on file     Minutes per session: Not on file    Stress: Not on file   Relationships    Social connections     Talks on phone: Not on file     Gets together: Not on file     Attends [employer] service: Not on file     Active member of club or organization: Not on file     Attends meetings of clubs or organizations: Not on file     Relationship status: Not on file   Other Topics Concern    Not on file   Social History Narrative    Not on file     Past Surgical History:   Procedure Laterality Date    FOOT SURGERY       Family History   Problem Relation Age of Onset    Glaucoma Mother        Review of patient's allergies indicates:   Allergen Reactions    Codeine     Morphine sulfate Other (See Comments)     Other reaction(s): Unknown       Current Outpatient Medications:     benztropine (COGENTIN) 1 MG tablet, Take 1 tablet (1 mg total) by mouth 2 (two) times daily., Disp: 180 tablet, Rfl: 2    CONTOUR METER Misc, UTD, Disp: , Rfl: 0    CONTOUR TEST STRIPS Strp, 1 each by In Vitro route 4 (four) times daily as needed., Disp: 200 each, Rfl: 5    FLUoxetine 40 MG capsule, Take 40 mg by mouth once daily. , Disp: , Rfl:     gabapentin (NEURONTIN) 300 MG capsule, Take 1 capsule (300 mg total) by mouth 2 (two) times daily., Disp: 180 capsule, Rfl: 1    LATUDA 80 mg Tab tablet, TK 1 T PO BID, Disp: , Rfl: 1    LINZESS 290 mcg Cap capsule, Take 1 capsule by mouth once daily.,  Disp: , Rfl:     metFORMIN (GLUCOPHAGE-XR) 500 MG ER 24hr tablet, Take 2 tablets (1,000 mg total) by mouth 2 (two) times daily with meals., Disp: 360 tablet, Rfl: 1    ondansetron (ZOFRAN-ODT) 4 MG TbDL, Take 1 tablet by mouth once daily., Disp: , Rfl:     OXcarbazepine (TRILEPTAL) 300 MG Tab, Take 1 tablet (300 mg total) by mouth once daily., Disp: 90 tablet, Rfl: 1    pantoprazole (PROTONIX) 20 MG tablet, Take 1 tablet (20 mg total) by mouth once daily., Disp: 30 tablet, Rfl: 5    potassium chloride SA (K-DUR,KLOR-CON) 20 MEQ tablet, Take 20 mEq by mouth once daily., Disp: , Rfl:     pravastatin (PRAVACHOL) 40 MG tablet, Take 1 tablet (40 mg total) by mouth once daily., Disp: 90 tablet, Rfl: 1    quetiapine (SEROQUEL) 300 MG Tab, Take by mouth., Disp: , Rfl:     TOUJEO SOLOSTAR U-300 INSULIN 300 unit/mL (1.5 mL) InPn pen, INJECT 30 UNITS SQ D, Disp: 9 mL, Rfl: 5    TRUEPLUS LANCETS 33 gauge Misc, 1 lancet by In Vitro route 4 (four) times daily., Disp: 200 each, Rfl: 0    Review of Systems   Constitutional: Negative for appetite change, fatigue, fever and unexpected weight change.   Respiratory: Negative for cough, chest tightness, shortness of breath and wheezing.    Cardiovascular: Positive for leg swelling. Negative for chest pain.   Gastrointestinal: Negative for abdominal pain, constipation, nausea and vomiting.        -heartburn   Genitourinary: Negative for decreased urine volume, difficulty urinating, dysuria and frequency.   Musculoskeletal: Negative for arthralgias, back pain, myalgias and neck pain.   Skin: Negative for rash.   Neurological: Positive for numbness. Negative for dizziness, weakness and headaches.   Hematological: Does not bruise/bleed easily.   Psychiatric/Behavioral: Negative for dysphoric mood, sleep disturbance and suicidal ideas. The patient is not nervous/anxious.    All other systems reviewed and are negative.         Objective:      Vitals:    09/21/20 1025   BP: 114/66  "  Pulse: 72   Temp: 98.2 °F (36.8 °C)   SpO2: 96%   Weight: 79.7 kg (175 lb 9.6 oz)   Height: 5' 6" (1.676 m)     Wt Readings from Last 3 Encounters:   09/21/20 79.7 kg (175 lb 9.6 oz)   05/21/20 78.5 kg (173 lb)   03/09/20 70.7 kg (155 lb 13.8 oz)         Physical Exam  Vitals signs reviewed.   Constitutional:       General: He is not in acute distress.     Appearance: Normal appearance. He is well-developed.      Comments: obese   HENT:      Head: Normocephalic and atraumatic.   Neck:      Musculoskeletal: Neck supple.      Thyroid: No thyromegaly.   Cardiovascular:      Rate and Rhythm: Normal rate and regular rhythm.      Heart sounds: Normal heart sounds. No murmur. No friction rub.   Pulmonary:      Effort: Pulmonary effort is normal.      Breath sounds: Normal breath sounds. No wheezing or rales.   Abdominal:      General: Bowel sounds are normal. There is no distension.      Palpations: Abdomen is soft.      Tenderness: There is no abdominal tenderness.   Musculoskeletal:      Right lower leg: Edema (mild) present.      Left lower leg: Edema (mild) present.   Lymphadenopathy:      Cervical: No cervical adenopathy.   Skin:     General: Skin is warm and dry.      Findings: No rash.   Neurological:      Mental Status: He is alert and oriented to person, place, and time.   Psychiatric:         Attention and Perception: He is attentive.         Mood and Affect: Mood is not anxious.         Speech: Speech is not rapid and pressured.         Behavior: Behavior normal. Behavior is not agitated.         Thought Content: Thought content normal.         Judgment: Judgment normal.           Assessment:       1. DM type 2 with diabetic peripheral neuropathy    2. Schizophrenia, unspecified type    3. Mixed hyperlipidemia    4. Chronic idiopathic constipation    5. Gastroesophageal reflux disease without esophagitis    6. Bladder outlet obstruction         Plan:       DM type 2 with diabetic peripheral " neuropathy  Comments:  Improved. HbA1c of 8.9%. Noncompliant with diet. To continue BS checks QID, given BS log, to return for adjustment  Orders:  -     gabapentin (NEURONTIN) 300 MG capsule; Take 1 capsule (300 mg total) by mouth 2 (two) times daily.  Dispense: 180 capsule; Refill: 1  -     metFORMIN (GLUCOPHAGE-XR) 500 MG ER 24hr tablet; Take 2 tablets (1,000 mg total) by mouth 2 (two) times daily with meals.  Dispense: 360 tablet; Refill: 1  -     CONTOUR TEST STRIPS Strp; 1 each by In Vitro route 4 (four) times daily as needed.  Dispense: 200 each; Refill: 5  -     Hemoglobin A1C, POCT    Schizophrenia, unspecified type  Comments:  Stable. Continue to follow with psyche.  Orders:  -     benztropine (COGENTIN) 1 MG tablet; Take 1 tablet (1 mg total) by mouth 2 (two) times daily.  Dispense: 180 tablet; Refill: 2    Mixed hyperlipidemia  Comments:  Controlled. Pravasatin refilled today.   Orders:  -     pravastatin (PRAVACHOL) 40 MG tablet; Take 1 tablet (40 mg total) by mouth once daily.  Dispense: 90 tablet; Refill: 1    Chronic idiopathic constipation  Comments:  Controlled. Will continue to monitor symptoms on linzess.    Gastroesophageal reflux disease without esophagitis  Comments:  Controlled. Will continue to monitor symptoms.    Bladder outlet obstruction  Comments:  Stable. To continue to follow with Dr. Pacheco.      Follow up in about 3 months (around 12/21/2020) for DM, Constipation, GERD.        9/21/2020 Peter Schwartz

## 2020-10-13 DIAGNOSIS — E11.42 DM TYPE 2 WITH DIABETIC PERIPHERAL NEUROPATHY: ICD-10-CM

## 2020-10-13 RX ORDER — INSULIN GLARGINE 300 U/ML
30 INJECTION, SOLUTION SUBCUTANEOUS DAILY
Qty: 9 ML | Refills: 5 | Status: SHIPPED | OUTPATIENT
Start: 2020-10-13 | End: 2021-06-23 | Stop reason: SDUPTHER

## 2020-10-13 NOTE — TELEPHONE ENCOUNTER
----- Message from Ted Varma sent at 10/13/2020  3:02 PM CDT -----  Regarding: refills  Tres[facility]   Pharm [facility] [place]   Pt [phone]

## 2020-10-13 NOTE — TELEPHONE ENCOUNTER
The patient's prescription has been approved and sent to   [id number] - [place], [place] - [address] AT [facility] [address]  [address] [address]  Phone: [phone] Fax: [phone]

## 2020-10-14 ENCOUNTER — TELEPHONE (OUTPATIENT)
Dept: FAMILY MEDICINE | Facility: CLINIC | Age: 60
End: 2020-10-14

## 2020-10-14 DIAGNOSIS — E11.42 DM TYPE 2 WITH DIABETIC PERIPHERAL NEUROPATHY: Primary | ICD-10-CM

## 2020-10-14 RX ORDER — INSULIN ASPART 100 [IU]/ML
15-30 INJECTION, SOLUTION INTRAVENOUS; SUBCUTANEOUS
Qty: 15 ML | Refills: 2 | Status: SHIPPED | OUTPATIENT
Start: 2020-10-14 | End: 2021-05-13 | Stop reason: SDUPTHER

## 2020-10-14 NOTE — TELEPHONE ENCOUNTER
Spoke with pharmacy pt has not filled this since Nov 2019. At the time he was using 15-30 units TID per sliding scale as needed. None of your last few notes mention anything about novolog. Please advise.

## 2020-10-14 NOTE — TELEPHONE ENCOUNTER
----- Message from Ted Varma sent at 10/14/2020  8:35 AM CDT -----  Regarding: REFILLS  NOVOLOG  PHARM [facility] [phone]  [phone]

## 2020-10-16 ENCOUNTER — TELEPHONE (OUTPATIENT)
Dept: HEMATOLOGY/ONCOLOGY | Facility: HOSPITAL | Age: 60
End: 2020-10-16

## 2020-10-16 NOTE — TELEPHONE ENCOUNTER
Called to see if patient completed lab work. Patient states they were done at [facility] in [place]. Patient was reminded of appointment for 10/19 at 10 am.

## 2020-10-18 NOTE — PROGRESS NOTES
[facility] Hematology/Oncology  PROGRESS NOTE - Follow-up Visit    Subjective:       Patient ID:   NAME: Matthieu Jovel : 1960     60 y.o. male    Referring Doc: Efren  Other Physicians:    Chief Complaint:  Iron defic anemia f/u    History of Present Illness:     Patient is being seen today in person in clinic      The patient is here with his  Sahra. He is resident of [facility]. He denies any new issues; no SOB, HA's or N/V.     Everyone at the home is doing ok so far with the corona virus epidemic    Discussed Covid19 precautions        ROS:   GEN: normal without any fever, night sweats or weight loss  HEENT: normal with no HA's, sore throat, stiff neck, changes in vision  CV: normal with no CP, SOB, PND, PRADO or orthopnea  PULM: normal with no SOB, cough, hemoptysis, sputum or pleuritic pain  GI: normal with no abdominal pain, nausea, vomiting, constipation, diarrhea, melanotic stools, BRBPR, or hematemesis  : normal with no hematuria, dysuria  BREAST: normal with no mass, discharge, pain  SKIN: normal with no rash, erythema, bruising, or swelling    Allergies:    Review of patient's allergies indicates:   Allergen Reactions    Codeine        Medications:    Current Outpatient Medications:     benztropine (COGENTIN) 1 MG tablet, Take 1 tablet (1 mg total) by mouth 2 (two) times daily., Disp: 180 tablet, Rfl: 2    CONTOUR METER Misc, UTD, Disp: , Rfl: 0    CONTOUR TEST STRIPS Strp, 1 each by In Vitro route 4 (four) times daily as needed., Disp: 200 each, Rfl: 5    FLUoxetine 40 MG capsule, Take 40 mg by mouth once daily. , Disp: , Rfl:     gabapentin (NEURONTIN) 300 MG capsule, Take 1 capsule (300 mg total) by mouth 2 (two) times daily., Disp: 180 capsule, Rfl: 1    insulin aspart U-100 (NOVOLOG FLEXPEN U-100 INSULIN) 100 unit/mL (3 mL) InPn pen, Inject 15-30 Units into the skin 3 (three) times daily with meals., Disp: 15 mL, Rfl: 2    LATUDA 80 mg Tab tablet, TK 1 T PO BID,  Disp: , Rfl: 1    LINZESS 290 mcg Cap capsule, Take 1 capsule by mouth once daily., Disp: , Rfl:     metFORMIN (GLUCOPHAGE-XR) 500 MG ER 24hr tablet, Take 2 tablets (1,000 mg total) by mouth 2 (two) times daily with meals., Disp: 360 tablet, Rfl: 1    ondansetron (ZOFRAN-ODT) 4 MG TbDL, Take 1 tablet by mouth once daily., Disp: , Rfl:     OXcarbazepine (TRILEPTAL) 300 MG Tab, Take 1 tablet (300 mg total) by mouth once daily., Disp: 90 tablet, Rfl: 1    pantoprazole (PROTONIX) 20 MG tablet, Take 1 tablet (20 mg total) by mouth once daily., Disp: 30 tablet, Rfl: 5    potassium chloride SA (K-DUR,KLOR-CON) 20 MEQ tablet, Take 20 mEq by mouth once daily., Disp: , Rfl:     pravastatin (PRAVACHOL) 40 MG tablet, Take 1 tablet (40 mg total) by mouth once daily., Disp: 90 tablet, Rfl: 1    quetiapine (SEROQUEL) 300 MG Tab, Take by mouth., Disp: , Rfl:     TOUJEO SOLOSTAR U-300 INSULIN 300 unit/mL (1.5 mL) InPn pen, Inject 30 Units into the skin once daily. INJECT 30 UNITS SQ D, Disp: 9 mL, Rfl: 5    TRUEPLUS LANCETS 33 gauge Misc, 1 lancet by In Vitro route 4 (four) times daily., Disp: 200 each, Rfl: 0    PMHx/PSHx Updates:  See patient's last visit with me on 6/4/2020  See H&P on 8/26/2013        Pathology:  none          Objective:     Vitals:  Blood pressure 129/72, pulse 72, temperature 98 °F (36.7 °C), resp. rate 18, weight 82.3 kg (181 lb 6.4 oz).        Physical Examination:   GEN: no apparent distress, comfortable; AAOx3  HEAD: atraumatic and normocephalic  EYES: no conjunctival pallor or muddiness, no icterus; normal pupil reaction to ambient light  ENT: OMM, no pharyngeal erythema, external bilateral ears WNL; no visible thrush or ulcers  NECK: no masses or swelling, trachea midline, no visible LAD/LN's   CV: no palpitations; no pedal edema; no noticeable JVD or neck vein distension  CHEST: Normal respiratory effort; chest wall breath movements symmetrical; no audible wheezing  ABDOM: non-distended; no  bloating  MUSC/Skeletal: ROM normal; joints visibly normal; no deformities or arthropathy  EXTREM: no clubbing, cyanosis, inflammation or swelling  SKIN: no rashes, lesions, ulcers, petechiae or subcutaneous nodules  : no patel  NEURO: moving all 4 extremities; AAOx3; no tremors  PSYCH: normal mood, affect and behavior  LYMPH: no visible LN's or LAD            Labs:        Lab Results   Component Value Date    WBC 8.1 06/03/2020    HGB 13.3 06/03/2020    HCT 39.3 06/03/2020    MCV 88.5 06/03/2020     06/03/2020           Lab Results   Component Value Date    IRON 92 06/03/2020    TIBC 310 06/03/2020    FERRITIN 35 (L) 06/03/2020     CMP  Sodium   Date Value Ref Range Status   06/03/2020 140 135 - 146 mmol/L Final   10/22/2018 137 134 - 144 mmol/L      Potassium   Date Value Ref Range Status   06/03/2020 4.4 3.5 - 5.3 mmol/L Final     Chloride   Date Value Ref Range Status   06/03/2020 102 98 - 110 mmol/L Final   10/22/2018 105 98 - 110 mmol/L      CO2   Date Value Ref Range Status   06/03/2020 27 20 - 32 mmol/L Final     Glucose   Date Value Ref Range Status   06/03/2020 47 (L) 65 - 99 mg/dL Final     Comment:                   Fasting reference interval        10/22/2018 356 (H) 70 - 99 mg/dL      BUN, Bld   Date Value Ref Range Status   06/03/2020 19 7 - 25 mg/dL Final     Creatinine   Date Value Ref Range Status   06/03/2020 1.28 0.70 - 1.33 mg/dL Final     Comment:     For patients >49 years of age, the reference limit  for Creatinine is approximately 13% higher for people  identified as -American.        10/22/2018 1.13 0.60 - 1.40 mg/dL      Calcium   Date Value Ref Range Status   06/03/2020 9.7 8.6 - 10.3 mg/dL Final     Total Protein   Date Value Ref Range Status   06/03/2020 7.0 6.1 - 8.1 g/dL Final     Albumin   Date Value Ref Range Status   06/03/2020 4.1 3.6 - 5.1 g/dL Final   10/22/2018 3.7 3.1 - 4.7 g/dL      Total Bilirubin   Date Value Ref Range Status   06/03/2020 0.3 0.2 - 1.2 mg/dL  Final     Alkaline Phosphatase   Date Value Ref Range Status   06/03/2020 133 35 - 144 U/L Final     AST   Date Value Ref Range Status   06/03/2020 13 10 - 35 U/L Final     ALT   Date Value Ref Range Status   06/03/2020 14 9 - 46 U/L Final     eGFR if    Date Value Ref Range Status   06/03/2020 71 > OR = 60 mL/min/1.73m2 Final     eGFR if non    Date Value Ref Range Status   06/03/2020 61 > OR = 60 mL/min/1.73m2 Final                   Radiology/Diagnostic Studies:    No results found.    I have reviewed all available lab results and radiology reports.    Assessment/Plan:   (1) 60 y.o. male with diagnosis of psychiatric and mental issues who is a resident of [facility]    (2) Mild chronic pancytopenia suspected secondary to antipsychotic meds  - he had some recent labs in June 2020 and the WBC and platelet count are WN;- his hgb is currently WNL  - iron is adequate with only slight decrease in the ferritn  - he is on oral iron with fergon bid    (3) Iron defic anemia in past    (4) DM         1. Iron deficiency anemia, unspecified iron deficiency anemia type     2. Other secondary thrombocytopenia     3. Leukopenia, unspecified type     4. Pancytopenia           PLAN:  1. Check labs every 3 months; continue oral iron bid (encouraged compliance)  2. F/u with PCP  3. encouraged compliance  4.  RTC in 6 months    Fax note to Efren    Total Time spent on patient:    I spent over 15 mins of time with the patient. Reviewed results of the recently ordered labs, tests, reports and studies; made directives with regards to the results. Over half of this time was spent couseling and coordinating care, making treatment and analytical decisions; ordering necessary labs, tests and studies; and discussing treatment options and setting up treatment plan(s) if indicated.          Discussion:       COVID-19 Discussion:    I had long discussion with patient and any applicable family about the  COVID-19 coronavirus epidemic and the recommended precautions with regard to cancer and/or hematology patients. I have re-iterated the CDC recommendations for adequate hand washing, use of hand -like products, and coughing into elbow, etc. In addition, especially for our patients who are on chemotherapy and/or our otherwise immunocompromised patients, I have recommended avoidance of crowds, including movie theaters, restaurants, churches, etc. I have recommended avoidance of any sick or symptomatic family members and/or friends. I have also recommended avoidance of any raw and unwashed food products, and general avoidance of food items that have not been prepared by themselves. The patient has been asked to call us immediately with any symptom developments, issues, questions or other general concerns.        I have explained all of the above in detail and the patient understands all of the current recommendation(s). I have answered all of their questions to the best of my ability and to their complete satisfaction.   The patient is to continue with the current management plan.            Electronically signed by Wing Arechiga MD

## 2020-10-19 ENCOUNTER — OFFICE VISIT (OUTPATIENT)
Dept: HEMATOLOGY/ONCOLOGY | Facility: CLINIC | Age: 60
End: 2020-10-19
Payer: MEDICARE

## 2020-10-19 VITALS
SYSTOLIC BLOOD PRESSURE: 129 MMHG | HEART RATE: 72 BPM | WEIGHT: 181.38 LBS | DIASTOLIC BLOOD PRESSURE: 72 MMHG | TEMPERATURE: 98 F | RESPIRATION RATE: 18 BRPM | BODY MASS INDEX: 29.28 KG/M2

## 2020-10-19 DIAGNOSIS — D72.819 LEUKOPENIA, UNSPECIFIED TYPE: ICD-10-CM

## 2020-10-19 DIAGNOSIS — D50.9 IRON DEFICIENCY ANEMIA, UNSPECIFIED IRON DEFICIENCY ANEMIA TYPE: Primary | ICD-10-CM

## 2020-10-19 DIAGNOSIS — D69.59 OTHER SECONDARY THROMBOCYTOPENIA: ICD-10-CM

## 2020-10-19 DIAGNOSIS — D61.818 PANCYTOPENIA: ICD-10-CM

## 2020-10-19 PROCEDURE — 99213 OFFICE O/P EST LOW 20 MIN: CPT | Mod: S$GLB,,, | Performed by: INTERNAL MEDICINE

## 2020-10-19 PROCEDURE — 99213 PR OFFICE/OUTPT VISIT, EST, LEVL III, 20-29 MIN: ICD-10-PCS | Mod: S$GLB,,, | Performed by: INTERNAL MEDICINE

## 2020-10-20 ENCOUNTER — TELEPHONE (OUTPATIENT)
Dept: HEMATOLOGY/ONCOLOGY | Facility: HOSPITAL | Age: 60
End: 2020-10-20

## 2020-10-20 RX ORDER — FERROUS GLUCONATE 324(38)MG
324 TABLET ORAL 2 TIMES DAILY
COMMUNITY
End: 2021-11-17 | Stop reason: SDUPTHER

## 2020-12-22 ENCOUNTER — TELEPHONE (OUTPATIENT)
Dept: FAMILY MEDICINE | Facility: CLINIC | Age: 60
End: 2020-12-22

## 2020-12-22 NOTE — TELEPHONE ENCOUNTER
----- Message from Nikos Zamudio sent at 12/22/2020 11:28 AM CST -----  Regarding: Be seen today / Right now  Contact: Matthieu Jovel  Pt  Mireille thought that the patient appt was today  and she is out in the parking lot right now. Mireille  would like to know if there's any way the patient can be seen right now/ today ?? She says the patient foot is infected .Please give her a call back asap # [phone]

## 2020-12-23 ENCOUNTER — OFFICE VISIT (OUTPATIENT)
Dept: FAMILY MEDICINE | Facility: CLINIC | Age: 60
End: 2020-12-23
Payer: MEDICARE

## 2020-12-23 VITALS
HEART RATE: 84 BPM | DIASTOLIC BLOOD PRESSURE: 80 MMHG | HEIGHT: 66 IN | WEIGHT: 174 LBS | SYSTOLIC BLOOD PRESSURE: 132 MMHG | BODY MASS INDEX: 27.97 KG/M2

## 2020-12-23 DIAGNOSIS — E11.42 DM TYPE 2 WITH DIABETIC PERIPHERAL NEUROPATHY: Primary | ICD-10-CM

## 2020-12-23 DIAGNOSIS — K21.9 GASTROESOPHAGEAL REFLUX DISEASE WITHOUT ESOPHAGITIS: ICD-10-CM

## 2020-12-23 DIAGNOSIS — L84 CALLUS: ICD-10-CM

## 2020-12-23 DIAGNOSIS — R42 DIZZINESS: ICD-10-CM

## 2020-12-23 DIAGNOSIS — K59.04 CHRONIC IDIOPATHIC CONSTIPATION: ICD-10-CM

## 2020-12-23 DIAGNOSIS — M21.611 BUNION OF GREAT TOE OF RIGHT FOOT: ICD-10-CM

## 2020-12-23 PROCEDURE — 99214 PR OFFICE/OUTPT VISIT, EST, LEVL IV, 30-39 MIN: ICD-10-PCS | Mod: S$GLB,,, | Performed by: FAMILY MEDICINE

## 2020-12-23 PROCEDURE — 99214 OFFICE O/P EST MOD 30 MIN: CPT | Mod: S$GLB,,, | Performed by: FAMILY MEDICINE

## 2020-12-23 NOTE — PROGRESS NOTES
SUBJECTIVE:    Patient ID: Matthieu Jovel is a 60 y.o. male.    Chief Complaint: Follow-up (possible right foot infected// 2-3 weeks ago// brought in list of meds// )    Pt here to checkup on acute and chronic conditions.    Pt complaining of pain in the right foot for a few days to a month. Thinks it might be infected.     Drinking and eating things he isn't supposed to be. Has recently been eating based on a diet based on IOP program. No weight gain since last visit.  Eating 3 times a day, snacking as well. Taking Toujeo 25-30 units at night.     Constipation is doing ok is linzess.  (Dr. Danielle). Has had cscope 4 times and has had inadequate prep.    Has been having heartburn lately.    HbA1c is 10.7%, which is worse.      Continue to see Dr. Pacheco, just seen last Tuesday, still doing in/out cath.    Pt recently had an episode when he got up late at night of dizziness.  Went to the ER and was dx with possible anxiety    ---------------------------------------------------------------------------          Admission on 11/13/2020, Discharged on 11/13/2020   Component Date Value Ref Range Status    NT-proBNP 11/13/2020 48  5 - 900 pg/mL Final    WBC 11/13/2020 5.79  3.90 - 12.70 K/uL Final    RBC 11/13/2020 4.34* 4.60 - 6.20 M/uL Final    Hemoglobin 11/13/2020 13.2* 14.0 - 18.0 g/dL Final    Hematocrit 11/13/2020 37.0* 40.0 - 54.0 % Final    MCV 11/13/2020 85  82 - 98 fL Final    MCH 11/13/2020 30.4  27.0 - 31.0 pg Final    MCHC 11/13/2020 35.7  32.0 - 36.0 g/dL Final    RDW 11/13/2020 13.0  11.5 - 14.5 % Final    Platelets 11/13/2020 189  150 - 350 K/uL Final    MPV 11/13/2020 10.1  9.2 - 12.9 fL Final    Immature Granulocytes 11/13/2020 0.3  0.0 - 0.5 % Final    Gran # (ANC) 11/13/2020 4.4  1.8 - 7.7 K/uL Final    Immature Grans (Abs) 11/13/2020 0.02  0.00 - 0.04 K/uL Final    Lymph # 11/13/2020 0.9* 1.0 - 4.8 K/uL Final    Mono # 11/13/2020 0.3  0.3 - 1.0 K/uL Final    Eos # 11/13/2020 0.1   0.0 - 0.5 K/uL Final    Baso # 11/13/2020 0.04  0.00 - 0.20 K/uL Final    nRBC 11/13/2020 0  0 /100 WBC Final    Gran % 11/13/2020 76.6* 38.0 - 73.0 % Final    Lymph % 11/13/2020 15.7* 18.0 - 48.0 % Final    Mono % 11/13/2020 5.5  4.0 - 15.0 % Final    Eosinophil % 11/13/2020 1.2  0.0 - 8.0 % Final    Basophil % 11/13/2020 0.7  0.0 - 1.9 % Final    Differential Method 11/13/2020 Automated   Final    Sodium 11/13/2020 134* 136 - 145 mmol/L Final    Potassium 11/13/2020 4.3  3.5 - 5.1 mmol/L Final    Chloride 11/13/2020 99  95 - 110 mmol/L Final    CO2 11/13/2020 30  22 - 31 mmol/L Final    Glucose 11/13/2020 312* 70 - 110 mg/dL Final    BUN 11/13/2020 15  9 - 21 mg/dL Final    Creatinine 11/13/2020 1.01  0.50 - 1.40 mg/dL Final    Calcium 11/13/2020 8.8  8.4 - 10.2 mg/dL Final    Total Protein 11/13/2020 6.8  6.0 - 8.4 g/dL Final    Albumin 11/13/2020 4.1  3.5 - 5.2 g/dL Final    Total Bilirubin 11/13/2020 0.3  0.2 - 1.3 mg/dL Final    Alkaline Phosphatase 11/13/2020 125  38 - 145 U/L Final    AST 11/13/2020 27  17 - 59 U/L Final    ALT 11/13/2020 21  0 - 50 U/L Final    Anion Gap 11/13/2020 5* 8 - 16 mmol/L Final    eGFR if African American 11/13/2020 >60  >60 mL/min/1.73 m^2 Final    eGFR if non African American 11/13/2020 >60  >60 mL/min/1.73 m^2 Final    Lipase Result 11/13/2020 41  23 - 300 U/L Final    Troponin I 11/13/2020 <0.012  0.012 - 0.034 ng/mL Final    Magnesium 11/13/2020 1.7  1.6 - 2.6 mg/dL Final    D-Dimer 11/13/2020 <0.27  <0.50 ug/mL FEU Final   Office Visit on 09/21/2020   Component Date Value Ref Range Status    Hemoglobin A1C 09/21/2020 8.9  % Final       Past Medical History:   Diagnosis Date    Development disorder, mixed     Diabetes mellitus type II     Mental and behavioral problems with communication (including speech)      Social History     Socioeconomic History    Marital status: Single     Spouse name: Not on file    Number of children: Not on file     Years of education: Not on file    Highest education level: Not on file   Occupational History    Not on file   Social Needs    Financial resource strain: Not on file    Food insecurity     Worry: Not on file     Inability: Not on file    Transportation needs     Medical: Not on file     Non-medical: Not on file   Tobacco Use    Smoking status: Former Smoker     Quit date: 1998     Years since quittin.6    Smokeless tobacco: Never Used   Substance and Sexual Activity    Alcohol use: No    Drug use: No    Sexual activity: Not on file   Lifestyle    Physical activity     Days per week: Not on file     Minutes per session: Not on file    Stress: Not on file   Relationships    Social connections     Talks on phone: Not on file     Gets together: Not on file     Attends Taoism service: Not on file     Active member of club or organization: Not on file     Attends meetings of clubs or organizations: Not on file     Relationship status: Not on file   Other Topics Concern    Not on file   Social History Narrative    Not on file     Past Surgical History:   Procedure Laterality Date    FOOT SURGERY       Family History   Problem Relation Age of Onset    Glaucoma Mother        Review of patient's allergies indicates:   Allergen Reactions    Codeine     Morphine sulfate Other (See Comments)     Other reaction(s): Unknown       Current Outpatient Medications:     benztropine (COGENTIN) 1 MG tablet, Take 1 tablet (1 mg total) by mouth 2 (two) times daily., Disp: 180 tablet, Rfl: 2    CONTOUR METER Misc, UTD, Disp: , Rfl: 0    CONTOUR TEST STRIPS Strp, 1 each by In Vitro route 4 (four) times daily as needed., Disp: 200 each, Rfl: 5    ferrous gluconate (FERGON) 324 MG tablet, Take 324 mg by mouth 2 (two) times a day., Disp: , Rfl:     FLUoxetine 40 MG capsule, Take 40 mg by mouth once daily. , Disp: , Rfl:     gabapentin (NEURONTIN) 300 MG capsule, Take 1 capsule (300 mg total) by mouth 2  (two) times daily., Disp: 180 capsule, Rfl: 1    insulin aspart U-100 (NOVOLOG FLEXPEN U-100 INSULIN) 100 unit/mL (3 mL) InPn pen, Inject 15-30 Units into the skin 3 (three) times daily with meals., Disp: 15 mL, Rfl: 2    LINZESS 290 mcg Cap capsule, Take 1 capsule by mouth once daily., Disp: , Rfl:     metFORMIN (GLUCOPHAGE-XR) 500 MG ER 24hr tablet, Take 2 tablets (1,000 mg total) by mouth 2 (two) times daily with meals., Disp: 360 tablet, Rfl: 1    pantoprazole (PROTONIX) 20 MG tablet, Take 1 tablet (20 mg total) by mouth once daily., Disp: 30 tablet, Rfl: 5    potassium chloride SA (K-DUR,KLOR-CON) 20 MEQ tablet, Take 20 mEq by mouth once daily., Disp: , Rfl:     pravastatin (PRAVACHOL) 40 MG tablet, Take 1 tablet (40 mg total) by mouth once daily., Disp: 90 tablet, Rfl: 1    quetiapine (SEROQUEL) 300 MG Tab, Take 200 mg by mouth once daily. , Disp: , Rfl:     ranitidine (ZANTAC) 150 MG capsule, Take 150 mg by mouth 2 (two) times daily., Disp: , Rfl:     TOUJEO SOLOSTAR U-300 INSULIN 300 unit/mL (1.5 mL) InPn pen, Inject 30 Units into the skin once daily. INJECT 30 UNITS SQ D, Disp: 9 mL, Rfl: 5    LATUDA 80 mg Tab tablet, TK 1 T PO BID, Disp: , Rfl: 1    ondansetron (ZOFRAN-ODT) 4 MG TbDL, Take 1 tablet by mouth once daily., Disp: , Rfl:     OXcarbazepine (TRILEPTAL) 300 MG Tab, Take 1 tablet (300 mg total) by mouth once daily., Disp: 90 tablet, Rfl: 1    TRUEPLUS LANCETS 33 gauge Misc, 1 lancet by In Vitro route 4 (four) times daily., Disp: 200 each, Rfl: 0    Review of Systems   Constitutional: Negative for appetite change, fatigue, fever and unexpected weight change.   Respiratory: Negative for cough, chest tightness, shortness of breath and wheezing.    Cardiovascular: Negative for chest pain and leg swelling.   Gastrointestinal: Negative for abdominal pain, constipation, nausea and vomiting.        -heartburn   Genitourinary: Negative for decreased urine volume, difficulty urinating, dysuria  "and frequency.   Musculoskeletal: Negative for arthralgias, back pain, myalgias and neck pain.   Skin: Negative for rash.   Neurological: Positive for numbness. Negative for dizziness, weakness and headaches.   Hematological: Does not bruise/bleed easily.   Psychiatric/Behavioral: Negative for dysphoric mood, sleep disturbance and suicidal ideas. The patient is not nervous/anxious.    All other systems reviewed and are negative.         Objective:      Vitals:    12/23/20 0833   BP: 132/80   Pulse: 84   Weight: 78.9 kg (174 lb)   Height: 5' 6" (1.676 m)     Physical Exam  Vitals signs reviewed.   Constitutional:       General: He is not in acute distress.     Appearance: He is well-developed.      Comments: obese   HENT:      Head: Normocephalic and atraumatic.   Neck:      Musculoskeletal: Neck supple.      Thyroid: No thyromegaly.   Cardiovascular:      Rate and Rhythm: Normal rate and regular rhythm.      Heart sounds: Normal heart sounds. No murmur. No friction rub.   Pulmonary:      Effort: Pulmonary effort is normal.      Breath sounds: Normal breath sounds. No wheezing or rales.   Abdominal:      General: Bowel sounds are normal. There is no distension.      Palpations: Abdomen is soft.      Tenderness: There is no abdominal tenderness.   Musculoskeletal:        Feet:    Lymphadenopathy:      Cervical: No cervical adenopathy.   Skin:     General: Skin is warm and dry.      Findings: No rash.   Neurological:      Mental Status: He is alert and oriented to person, place, and time.   Psychiatric:         Attention and Perception: He is attentive.         Mood and Affect: Mood is not anxious.         Speech: Speech is not rapid and pressured.         Behavior: Behavior normal. Behavior is not agitated.         Thought Content: Thought content normal.         Judgment: Judgment normal.           Assessment:       1. DM type 2 with diabetic peripheral neuropathy    2. Bunion of great toe of right foot    3. Callus  "   4. Dizziness    5. Gastroesophageal reflux disease without esophagitis    6. Chronic idiopathic constipation         Plan:       DM type 2 with diabetic peripheral neuropathy  Comments:  Uncontrolled. A1c 10.7%. To continue BS logs, ADA diet per IOP, pt w/concerns of cost of diet. To continue regular activity. Will continue to montior A1c  Orders:  -     POCT HEMOGLOBIN A1C    Bunion of great toe of right foot    Callus  Comments:  Acute. Symptomatic. Pt referred to Podiatry for eval.    Dizziness  Comments:  Asymptomatic. Will refer to Cards for further eval as pt with long hx of DM.  Orders:  -     Ambulatory referral/consult to Cardiology; Future; Expected date: 12/30/2020    Gastroesophageal reflux disease without esophagitis  Comments:  Symptomatic. Will continue to monitor on protonix    Chronic idiopathic constipation  Comments:  Controlled. Will continue to monitor on linzess    Other  Lab results discussed and reviewed with patient.    Follow up in about 3 months (around 3/23/2021) for DM.        12/27/2020 Peter Schwartz

## 2020-12-31 LAB
ALBUMIN SERPL-MCNC: 4.3 G/DL (ref 3.8–4.9)
ALBUMIN/GLOB SERPL: 1.9 {RATIO} (ref 1.2–2.2)
ALP SERPL-CCNC: 143 IU/L (ref 39–117)
ALT SERPL-CCNC: 14 IU/L (ref 0–44)
AST SERPL-CCNC: 16 IU/L (ref 0–40)
BASOPHILS # BLD AUTO: 0 X10E3/UL (ref 0–0.2)
BASOPHILS NFR BLD AUTO: 1 %
BILIRUB SERPL-MCNC: 0.2 MG/DL (ref 0–1.2)
BUN SERPL-MCNC: 13 MG/DL (ref 6–24)
BUN/CREAT SERPL: 10 (ref 9–20)
CALCIUM SERPL-MCNC: 9.3 MG/DL (ref 8.7–10.2)
CHLORIDE SERPL-SCNC: 101 MMOL/L (ref 96–106)
CO2 SERPL-SCNC: 24 MMOL/L (ref 20–29)
CREAT SERPL-MCNC: 1.25 MG/DL (ref 0.76–1.27)
EOSINOPHIL # BLD AUTO: 0.1 X10E3/UL (ref 0–0.4)
EOSINOPHIL NFR BLD AUTO: 1 %
ERYTHROCYTE [DISTWIDTH] IN BLOOD BY AUTOMATED COUNT: 14.2 % (ref 11.6–15.4)
FERRITIN SERPL-MCNC: 101 NG/ML (ref 30–400)
GLOBULIN SER CALC-MCNC: 2.3 G/DL (ref 1.5–4.5)
GLUCOSE SERPL-MCNC: 146 MG/DL (ref 65–99)
HCT VFR BLD AUTO: 37.9 % (ref 37.5–51)
HGB BLD-MCNC: 13 G/DL (ref 13–17.7)
IMM GRANULOCYTES # BLD AUTO: 0 X10E3/UL (ref 0–0.1)
IMM GRANULOCYTES NFR BLD AUTO: 0 %
IRON SATN MFR SERPL: 16 % (ref 15–55)
IRON SERPL-MCNC: 51 UG/DL (ref 38–169)
LYMPHOCYTES # BLD AUTO: 0.9 X10E3/UL (ref 0.7–3.1)
LYMPHOCYTES NFR BLD AUTO: 17 %
MCH RBC QN AUTO: 30.5 PG (ref 26.6–33)
MCHC RBC AUTO-ENTMCNC: 34.3 G/DL (ref 31.5–35.7)
MCV RBC AUTO: 89 FL (ref 79–97)
MONOCYTES # BLD AUTO: 0.3 X10E3/UL (ref 0.1–0.9)
MONOCYTES NFR BLD AUTO: 6 %
NEUTROPHILS # BLD AUTO: 4 X10E3/UL (ref 1.4–7)
NEUTROPHILS NFR BLD AUTO: 75 %
PLATELET # BLD AUTO: 245 X10E3/UL (ref 150–450)
POTASSIUM SERPL-SCNC: 4.3 MMOL/L (ref 3.5–5.2)
PROT SERPL-MCNC: 6.6 G/DL (ref 6–8.5)
RBC # BLD AUTO: 4.26 X10E6/UL (ref 4.14–5.8)
SODIUM SERPL-SCNC: 137 MMOL/L (ref 134–144)
TIBC SERPL-MCNC: 310 UG/DL (ref 250–450)
UIBC SERPL-MCNC: 259 UG/DL (ref 111–343)
WBC # BLD AUTO: 5.3 X10E3/UL (ref 3.4–10.8)

## 2021-01-07 DIAGNOSIS — K21.9 GASTROESOPHAGEAL REFLUX DISEASE WITHOUT ESOPHAGITIS: ICD-10-CM

## 2021-01-07 RX ORDER — PANTOPRAZOLE SODIUM 20 MG/1
20 TABLET, DELAYED RELEASE ORAL DAILY
Qty: 90 TABLET | Refills: 1 | Status: SHIPPED | OUTPATIENT
Start: 2021-01-07 | End: 2021-03-23 | Stop reason: SDUPTHER

## 2021-01-07 RX ORDER — POTASSIUM CHLORIDE 20 MEQ/1
20 TABLET, EXTENDED RELEASE ORAL DAILY
Qty: 90 TABLET | Refills: 1 | Status: SHIPPED | OUTPATIENT
Start: 2021-01-07 | End: 2021-03-23 | Stop reason: SDUPTHER

## 2021-01-21 DIAGNOSIS — E11.42 DM TYPE 2 WITH DIABETIC PERIPHERAL NEUROPATHY: Primary | ICD-10-CM

## 2021-03-23 ENCOUNTER — OFFICE VISIT (OUTPATIENT)
Dept: FAMILY MEDICINE | Facility: CLINIC | Age: 61
End: 2021-03-23
Payer: MEDICARE

## 2021-03-23 VITALS
DIASTOLIC BLOOD PRESSURE: 72 MMHG | HEIGHT: 66 IN | SYSTOLIC BLOOD PRESSURE: 118 MMHG | HEART RATE: 68 BPM | BODY MASS INDEX: 27.64 KG/M2 | WEIGHT: 172 LBS

## 2021-03-23 DIAGNOSIS — Z13.6 SCREENING FOR ISCHEMIC HEART DISEASE (IHD): ICD-10-CM

## 2021-03-23 DIAGNOSIS — E11.42 DM TYPE 2 WITH DIABETIC PERIPHERAL NEUROPATHY: Primary | ICD-10-CM

## 2021-03-23 DIAGNOSIS — Z79.899 LONG-TERM USE OF HIGH-RISK MEDICATION: ICD-10-CM

## 2021-03-23 DIAGNOSIS — Z13.89 SCREENING FOR BLOOD OR PROTEIN IN URINE: ICD-10-CM

## 2021-03-23 DIAGNOSIS — E78.2 MIXED HYPERLIPIDEMIA: ICD-10-CM

## 2021-03-23 DIAGNOSIS — Z13.29 SCREENING FOR ENDOCRINE DISORDER: ICD-10-CM

## 2021-03-23 DIAGNOSIS — K21.9 GASTROESOPHAGEAL REFLUX DISEASE WITHOUT ESOPHAGITIS: ICD-10-CM

## 2021-03-23 DIAGNOSIS — F20.9 SCHIZOPHRENIA, UNSPECIFIED TYPE: ICD-10-CM

## 2021-03-23 PROCEDURE — 99214 PR OFFICE/OUTPT VISIT, EST, LEVL IV, 30-39 MIN: ICD-10-PCS | Mod: S$GLB,,, | Performed by: FAMILY MEDICINE

## 2021-03-23 PROCEDURE — 99214 OFFICE O/P EST MOD 30 MIN: CPT | Mod: S$GLB,,, | Performed by: FAMILY MEDICINE

## 2021-03-23 RX ORDER — PEN NEEDLE, DIABETIC 31 GX5/16"
NEEDLE, DISPOSABLE MISCELLANEOUS
COMMUNITY
Start: 2021-01-12 | End: 2021-03-23 | Stop reason: SDUPTHER

## 2021-03-23 RX ORDER — ONDANSETRON 4 MG/1
4 TABLET, ORALLY DISINTEGRATING ORAL DAILY
Qty: 30 TABLET | Refills: 1 | Status: CANCELLED | OUTPATIENT
Start: 2021-03-23

## 2021-03-23 RX ORDER — LANCETS 33 GAUGE
1 EACH MISCELLANEOUS 4 TIMES DAILY
Qty: 200 EACH | Refills: 1 | Status: SHIPPED | OUTPATIENT
Start: 2021-03-23 | End: 2021-06-23 | Stop reason: SDUPTHER

## 2021-03-23 RX ORDER — FLUOXETINE HYDROCHLORIDE 20 MG/1
CAPSULE ORAL
COMMUNITY
Start: 2021-03-16 | End: 2022-08-23 | Stop reason: DRUGHIGH

## 2021-03-23 RX ORDER — METFORMIN HYDROCHLORIDE 500 MG/1
1000 TABLET, EXTENDED RELEASE ORAL 2 TIMES DAILY WITH MEALS
Qty: 360 TABLET | Refills: 1 | Status: SHIPPED | OUTPATIENT
Start: 2021-03-23 | End: 2021-05-11 | Stop reason: SDUPTHER

## 2021-03-23 RX ORDER — PANTOPRAZOLE SODIUM 20 MG/1
20 TABLET, DELAYED RELEASE ORAL DAILY
Qty: 90 TABLET | Refills: 1 | Status: SHIPPED | OUTPATIENT
Start: 2021-03-23 | End: 2021-09-21 | Stop reason: SDUPTHER

## 2021-03-23 RX ORDER — PEN NEEDLE, DIABETIC 31 GX5/16"
NEEDLE, DISPOSABLE MISCELLANEOUS
Qty: 100 EACH | Refills: 1 | Status: SHIPPED | OUTPATIENT
Start: 2021-03-23 | End: 2021-06-23 | Stop reason: SDUPTHER

## 2021-03-23 RX ORDER — BENZTROPINE MESYLATE 1 MG/1
1 TABLET ORAL 2 TIMES DAILY
Qty: 180 TABLET | Refills: 2 | Status: SHIPPED | OUTPATIENT
Start: 2021-03-23 | End: 2021-09-21 | Stop reason: SDUPTHER

## 2021-03-23 RX ORDER — GABAPENTIN 300 MG/1
300 CAPSULE ORAL 2 TIMES DAILY
Qty: 180 CAPSULE | Refills: 1 | Status: SHIPPED | OUTPATIENT
Start: 2021-03-23 | End: 2021-07-21 | Stop reason: SDUPTHER

## 2021-03-23 RX ORDER — PRAVASTATIN SODIUM 40 MG/1
40 TABLET ORAL DAILY
Qty: 90 TABLET | Refills: 1 | Status: SHIPPED | OUTPATIENT
Start: 2021-03-23 | End: 2021-09-21 | Stop reason: SDUPTHER

## 2021-03-23 RX ORDER — POTASSIUM CHLORIDE 20 MEQ/1
20 TABLET, EXTENDED RELEASE ORAL DAILY
Qty: 90 TABLET | Refills: 1 | Status: SHIPPED | OUTPATIENT
Start: 2021-03-23 | End: 2022-03-08 | Stop reason: SDUPTHER

## 2021-04-19 ENCOUNTER — TELEPHONE (OUTPATIENT)
Dept: HEMATOLOGY/ONCOLOGY | Facility: CLINIC | Age: 61
End: 2021-04-19

## 2021-04-20 ENCOUNTER — OFFICE VISIT (OUTPATIENT)
Dept: HEMATOLOGY/ONCOLOGY | Facility: CLINIC | Age: 61
End: 2021-04-20
Payer: MEDICARE

## 2021-04-20 VITALS
TEMPERATURE: 98 F | SYSTOLIC BLOOD PRESSURE: 132 MMHG | WEIGHT: 171 LBS | HEART RATE: 83 BPM | DIASTOLIC BLOOD PRESSURE: 75 MMHG | BODY MASS INDEX: 26.78 KG/M2 | RESPIRATION RATE: 18 BRPM

## 2021-04-20 DIAGNOSIS — D61.818 PANCYTOPENIA: ICD-10-CM

## 2021-04-20 DIAGNOSIS — D72.819 LEUKOPENIA, UNSPECIFIED TYPE: ICD-10-CM

## 2021-04-20 DIAGNOSIS — D69.59 OTHER SECONDARY THROMBOCYTOPENIA: Primary | ICD-10-CM

## 2021-04-20 DIAGNOSIS — D50.9 IRON DEFICIENCY ANEMIA, UNSPECIFIED IRON DEFICIENCY ANEMIA TYPE: ICD-10-CM

## 2021-04-20 PROCEDURE — 99213 OFFICE O/P EST LOW 20 MIN: CPT | Mod: S$GLB,,, | Performed by: INTERNAL MEDICINE

## 2021-04-20 PROCEDURE — 99213 PR OFFICE/OUTPT VISIT, EST, LEVL III, 20-29 MIN: ICD-10-PCS | Mod: S$GLB,,, | Performed by: INTERNAL MEDICINE

## 2021-04-29 DIAGNOSIS — E11.42 DM TYPE 2 WITH DIABETIC PERIPHERAL NEUROPATHY: ICD-10-CM

## 2021-04-29 RX ORDER — LINACLOTIDE 290 UG/1
290 CAPSULE, GELATIN COATED ORAL DAILY
Qty: 90 CAPSULE | Refills: 1 | Status: SHIPPED | OUTPATIENT
Start: 2021-04-29 | End: 2021-11-09 | Stop reason: SDUPTHER

## 2021-05-11 DIAGNOSIS — E11.42 DM TYPE 2 WITH DIABETIC PERIPHERAL NEUROPATHY: ICD-10-CM

## 2021-05-11 LAB
ALBUMIN SERPL-MCNC: 4.7 G/DL (ref 3.8–4.9)
ALBUMIN/GLOB SERPL: 2 {RATIO} (ref 1.2–2.2)
ALP SERPL-CCNC: 173 IU/L (ref 39–117)
ALT SERPL-CCNC: 14 IU/L (ref 0–44)
AST SERPL-CCNC: 14 IU/L (ref 0–40)
BASOPHILS # BLD AUTO: 0.1 X10E3/UL (ref 0–0.2)
BASOPHILS NFR BLD AUTO: 2 %
BILIRUB SERPL-MCNC: 0.2 MG/DL (ref 0–1.2)
BUN SERPL-MCNC: 16 MG/DL (ref 8–27)
BUN/CREAT SERPL: 13 (ref 10–24)
CALCIUM SERPL-MCNC: 9.7 MG/DL (ref 8.6–10.2)
CHLORIDE SERPL-SCNC: 99 MMOL/L (ref 96–106)
CO2 SERPL-SCNC: 22 MMOL/L (ref 20–29)
CREAT SERPL-MCNC: 1.21 MG/DL (ref 0.76–1.27)
EOSINOPHIL # BLD AUTO: 0.2 X10E3/UL (ref 0–0.4)
EOSINOPHIL NFR BLD AUTO: 3 %
ERYTHROCYTE [DISTWIDTH] IN BLOOD BY AUTOMATED COUNT: 13.4 % (ref 11.6–15.4)
FERRITIN SERPL-MCNC: 74 NG/ML (ref 30–400)
GLOBULIN SER CALC-MCNC: 2.4 G/DL (ref 1.5–4.5)
GLUCOSE SERPL-MCNC: 107 MG/DL (ref 65–99)
HCT VFR BLD AUTO: 41.8 % (ref 37.5–51)
HGB BLD-MCNC: 13.9 G/DL (ref 13–17.7)
IMM GRANULOCYTES # BLD AUTO: 0 X10E3/UL (ref 0–0.1)
IMM GRANULOCYTES NFR BLD AUTO: 0 %
IRON SATN MFR SERPL: 27 % (ref 15–55)
IRON SERPL-MCNC: 82 UG/DL (ref 38–169)
LYMPHOCYTES # BLD AUTO: 1.3 X10E3/UL (ref 0.7–3.1)
LYMPHOCYTES NFR BLD AUTO: 23 %
MCH RBC QN AUTO: 29.9 PG (ref 26.6–33)
MCHC RBC AUTO-ENTMCNC: 33.3 G/DL (ref 31.5–35.7)
MCV RBC AUTO: 90 FL (ref 79–97)
MONOCYTES # BLD AUTO: 0.5 X10E3/UL (ref 0.1–0.9)
MONOCYTES NFR BLD AUTO: 9 %
NEUTROPHILS # BLD AUTO: 3.5 X10E3/UL (ref 1.4–7)
NEUTROPHILS NFR BLD AUTO: 63 %
PLATELET # BLD AUTO: 217 X10E3/UL (ref 150–450)
POTASSIUM SERPL-SCNC: 4.3 MMOL/L (ref 3.5–5.2)
PROT SERPL-MCNC: 7.1 G/DL (ref 6–8.5)
RBC # BLD AUTO: 4.65 X10E6/UL (ref 4.14–5.8)
SODIUM SERPL-SCNC: 136 MMOL/L (ref 134–144)
TIBC SERPL-MCNC: 304 UG/DL (ref 250–450)
UIBC SERPL-MCNC: 222 UG/DL (ref 111–343)
WBC # BLD AUTO: 5.5 X10E3/UL (ref 3.4–10.8)

## 2021-05-11 RX ORDER — METFORMIN HYDROCHLORIDE 500 MG/1
1000 TABLET, EXTENDED RELEASE ORAL 2 TIMES DAILY WITH MEALS
Qty: 360 TABLET | Refills: 1 | Status: SHIPPED | OUTPATIENT
Start: 2021-05-11 | End: 2021-09-21 | Stop reason: SDUPTHER

## 2021-05-13 DIAGNOSIS — E11.42 DM TYPE 2 WITH DIABETIC PERIPHERAL NEUROPATHY: ICD-10-CM

## 2021-05-13 RX ORDER — INSULIN ASPART 100 [IU]/ML
15-30 INJECTION, SOLUTION INTRAVENOUS; SUBCUTANEOUS
Qty: 15 ML | Refills: 2 | Status: SHIPPED | OUTPATIENT
Start: 2021-05-13 | End: 2021-06-23 | Stop reason: SDUPTHER

## 2021-06-09 ENCOUNTER — TELEPHONE (OUTPATIENT)
Dept: FAMILY MEDICINE | Facility: CLINIC | Age: 61
End: 2021-06-09

## 2021-06-16 LAB
ALBUMIN SERPL-MCNC: 4.1 G/DL (ref 3.6–5.1)
ALBUMIN/CREAT UR: 9 MCG/MG CREAT
ALBUMIN/GLOB SERPL: 1.8 (CALC) (ref 1–2.5)
ALP SERPL-CCNC: 121 U/L (ref 35–144)
ALT SERPL-CCNC: 9 U/L (ref 9–46)
APPEARANCE UR: CLEAR
AST SERPL-CCNC: 13 U/L (ref 10–35)
BASOPHILS # BLD AUTO: 77 CELLS/UL (ref 0–200)
BASOPHILS NFR BLD AUTO: 1.1 %
BILIRUB SERPL-MCNC: 0.3 MG/DL (ref 0.2–1.2)
BILIRUB UR QL STRIP: NEGATIVE
BUN SERPL-MCNC: 13 MG/DL (ref 7–25)
BUN/CREAT SERPL: ABNORMAL (CALC) (ref 6–22)
CALCIUM SERPL-MCNC: 8.6 MG/DL (ref 8.6–10.3)
CHLORIDE SERPL-SCNC: 97 MMOL/L (ref 98–110)
CHOLEST SERPL-MCNC: 137 MG/DL
CHOLEST/HDLC SERPL: 2.1 (CALC)
CO2 SERPL-SCNC: 24 MMOL/L (ref 20–32)
COLOR UR: YELLOW
CREAT SERPL-MCNC: 1.07 MG/DL (ref 0.7–1.25)
CREAT UR-MCNC: 23 MG/DL (ref 20–320)
EOSINOPHIL # BLD AUTO: 42 CELLS/UL (ref 15–500)
EOSINOPHIL NFR BLD AUTO: 0.6 %
ERYTHROCYTE [DISTWIDTH] IN BLOOD BY AUTOMATED COUNT: 13.7 % (ref 11–15)
GLOBULIN SER CALC-MCNC: 2.3 G/DL (CALC) (ref 1.9–3.7)
GLUCOSE SERPL-MCNC: 152 MG/DL (ref 65–139)
GLUCOSE UR QL STRIP: ABNORMAL
HBA1C MFR BLD: 7.6 % OF TOTAL HGB
HCT VFR BLD AUTO: 36.1 % (ref 38.5–50)
HDLC SERPL-MCNC: 66 MG/DL
HGB BLD-MCNC: 11.7 G/DL (ref 13.2–17.1)
HGB UR QL STRIP: NEGATIVE
KETONES UR QL STRIP: NEGATIVE
LDLC SERPL CALC-MCNC: 55 MG/DL (CALC)
LEUKOCYTE ESTERASE UR QL STRIP: ABNORMAL
LYMPHOCYTES # BLD AUTO: 1036 CELLS/UL (ref 850–3900)
LYMPHOCYTES NFR BLD AUTO: 14.8 %
MCH RBC QN AUTO: 29 PG (ref 27–33)
MCHC RBC AUTO-ENTMCNC: 32.4 G/DL (ref 32–36)
MCV RBC AUTO: 89.6 FL (ref 80–100)
MICROALBUMIN UR-MCNC: 0.2 MG/DL
MONOCYTES # BLD AUTO: 392 CELLS/UL (ref 200–950)
MONOCYTES NFR BLD AUTO: 5.6 %
NEUTROPHILS # BLD AUTO: 5453 CELLS/UL (ref 1500–7800)
NEUTROPHILS NFR BLD AUTO: 77.9 %
NITRITE UR QL STRIP: NEGATIVE
NONHDLC SERPL-MCNC: 71 MG/DL (CALC)
PH UR STRIP: 6.5 [PH] (ref 5–8)
PLATELET # BLD AUTO: 300 THOUSAND/UL (ref 140–400)
PMV BLD REES-ECKER: 9.1 FL (ref 7.5–12.5)
POTASSIUM SERPL-SCNC: 4.4 MMOL/L (ref 3.5–5.3)
PROT SERPL-MCNC: 6.4 G/DL (ref 6.1–8.1)
PROT UR QL STRIP: NEGATIVE
RBC # BLD AUTO: 4.03 MILLION/UL (ref 4.2–5.8)
SODIUM SERPL-SCNC: 131 MMOL/L (ref 135–146)
SP GR UR STRIP: 1 (ref 1–1.03)
TRIGL SERPL-MCNC: 77 MG/DL
TSH SERPL-ACNC: 4.35 MIU/L (ref 0.4–4.5)
WBC # BLD AUTO: 7 THOUSAND/UL (ref 3.8–10.8)

## 2021-06-23 ENCOUNTER — OFFICE VISIT (OUTPATIENT)
Dept: FAMILY MEDICINE | Facility: CLINIC | Age: 61
End: 2021-06-23
Payer: MEDICARE

## 2021-06-23 VITALS
BODY MASS INDEX: 29.34 KG/M2 | HEART RATE: 74 BPM | OXYGEN SATURATION: 97 % | SYSTOLIC BLOOD PRESSURE: 118 MMHG | DIASTOLIC BLOOD PRESSURE: 74 MMHG | WEIGHT: 181.81 LBS

## 2021-06-23 DIAGNOSIS — K21.9 GASTROESOPHAGEAL REFLUX DISEASE WITHOUT ESOPHAGITIS: ICD-10-CM

## 2021-06-23 DIAGNOSIS — E11.42 DM TYPE 2 WITH DIABETIC PERIPHERAL NEUROPATHY: Primary | ICD-10-CM

## 2021-06-23 DIAGNOSIS — F20.9 SCHIZOPHRENIA, UNSPECIFIED TYPE: ICD-10-CM

## 2021-06-23 DIAGNOSIS — I87.303 STASIS EDEMA OF BOTH LOWER EXTREMITIES: ICD-10-CM

## 2021-06-23 DIAGNOSIS — K59.04 CHRONIC IDIOPATHIC CONSTIPATION: ICD-10-CM

## 2021-06-23 PROCEDURE — 83036 POCT HEMOGLOBIN A1C: ICD-10-PCS | Mod: QW,,, | Performed by: FAMILY MEDICINE

## 2021-06-23 PROCEDURE — 83036 HEMOGLOBIN GLYCOSYLATED A1C: CPT | Mod: QW,,, | Performed by: FAMILY MEDICINE

## 2021-06-23 PROCEDURE — 99214 PR OFFICE/OUTPT VISIT, EST, LEVL IV, 30-39 MIN: ICD-10-PCS | Mod: S$GLB,,, | Performed by: FAMILY MEDICINE

## 2021-06-23 PROCEDURE — 99214 OFFICE O/P EST MOD 30 MIN: CPT | Mod: S$GLB,,, | Performed by: FAMILY MEDICINE

## 2021-06-23 RX ORDER — INSULIN GLARGINE 300 U/ML
30 INJECTION, SOLUTION SUBCUTANEOUS DAILY
Qty: 1 PEN | Refills: 2 | Status: SHIPPED | OUTPATIENT
Start: 2021-06-23 | End: 2022-07-21 | Stop reason: SDUPTHER

## 2021-06-23 RX ORDER — HYDROCODONE BITARTRATE AND ACETAMINOPHEN 5; 325 MG/1; MG/1
1 TABLET ORAL
COMMUNITY
Start: 2021-05-14 | End: 2022-07-13

## 2021-06-23 RX ORDER — LANCETS 33 GAUGE
1 EACH MISCELLANEOUS 4 TIMES DAILY
Qty: 200 EACH | Refills: 1 | Status: SHIPPED | OUTPATIENT
Start: 2021-06-23 | End: 2023-05-10 | Stop reason: SDUPTHER

## 2021-06-23 RX ORDER — PEN NEEDLE, DIABETIC 31 GX5/16"
NEEDLE, DISPOSABLE MISCELLANEOUS
Qty: 100 EACH | Refills: 1 | Status: SHIPPED | OUTPATIENT
Start: 2021-06-23 | End: 2022-04-06 | Stop reason: SDUPTHER

## 2021-06-23 RX ORDER — INSULIN ASPART 100 [IU]/ML
15-30 INJECTION, SOLUTION INTRAVENOUS; SUBCUTANEOUS
Qty: 15 ML | Refills: 2 | Status: SHIPPED | OUTPATIENT
Start: 2021-06-23 | End: 2022-04-06 | Stop reason: SDUPTHER

## 2021-06-23 RX ORDER — HYDROCODONE BITARTRATE AND ACETAMINOPHEN 5; 325 MG/1; MG/1
1 TABLET ORAL
Status: CANCELLED | OUTPATIENT
Start: 2021-06-23

## 2021-07-07 ENCOUNTER — TELEPHONE (OUTPATIENT)
Dept: FAMILY MEDICINE | Facility: CLINIC | Age: 61
End: 2021-07-07

## 2021-07-07 LAB — HBA1C MFR BLD: 7.5 %

## 2021-07-21 DIAGNOSIS — E11.42 DM TYPE 2 WITH DIABETIC PERIPHERAL NEUROPATHY: ICD-10-CM

## 2021-07-21 RX ORDER — GABAPENTIN 300 MG/1
300 CAPSULE ORAL 2 TIMES DAILY
Qty: 180 CAPSULE | Refills: 1 | Status: SHIPPED | OUTPATIENT
Start: 2021-07-21 | End: 2021-09-21 | Stop reason: SDUPTHER

## 2021-09-21 ENCOUNTER — OFFICE VISIT (OUTPATIENT)
Dept: FAMILY MEDICINE | Facility: CLINIC | Age: 61
End: 2021-09-21
Payer: MEDICARE

## 2021-09-21 VITALS
WEIGHT: 176 LBS | SYSTOLIC BLOOD PRESSURE: 132 MMHG | DIASTOLIC BLOOD PRESSURE: 84 MMHG | HEART RATE: 70 BPM | BODY MASS INDEX: 28.41 KG/M2

## 2021-09-21 DIAGNOSIS — Z76.0 MEDICATION REFILL: ICD-10-CM

## 2021-09-21 DIAGNOSIS — F20.9 SCHIZOPHRENIA, UNSPECIFIED TYPE: ICD-10-CM

## 2021-09-21 DIAGNOSIS — Z79.899 LONG-TERM USE OF HIGH-RISK MEDICATION: ICD-10-CM

## 2021-09-21 DIAGNOSIS — K21.9 GASTROESOPHAGEAL REFLUX DISEASE WITHOUT ESOPHAGITIS: ICD-10-CM

## 2021-09-21 DIAGNOSIS — E11.42 DM TYPE 2 WITH DIABETIC PERIPHERAL NEUROPATHY: Primary | ICD-10-CM

## 2021-09-21 DIAGNOSIS — Z23 INFLUENZA VACCINATION ADMINISTERED AT CURRENT VISIT: ICD-10-CM

## 2021-09-21 DIAGNOSIS — I87.303 STASIS EDEMA OF BOTH LOWER EXTREMITIES: ICD-10-CM

## 2021-09-21 LAB — HBA1C MFR BLD: 9.9 %

## 2021-09-21 PROCEDURE — 83036 POCT HEMOGLOBIN A1C: ICD-10-PCS | Mod: QW,,, | Performed by: FAMILY MEDICINE

## 2021-09-21 PROCEDURE — 83036 HEMOGLOBIN GLYCOSYLATED A1C: CPT | Mod: QW,,, | Performed by: FAMILY MEDICINE

## 2021-09-21 PROCEDURE — G0008 ADMIN INFLUENZA VIRUS VAC: HCPCS | Mod: S$GLB,,, | Performed by: FAMILY MEDICINE

## 2021-09-21 PROCEDURE — 99214 PR OFFICE/OUTPT VISIT, EST, LEVL IV, 30-39 MIN: ICD-10-PCS | Mod: 25,S$GLB,, | Performed by: FAMILY MEDICINE

## 2021-09-21 PROCEDURE — 99214 OFFICE O/P EST MOD 30 MIN: CPT | Mod: 25,S$GLB,, | Performed by: FAMILY MEDICINE

## 2021-09-21 PROCEDURE — 90682 RIV4 VACC RECOMBINANT DNA IM: CPT | Mod: S$GLB,,, | Performed by: FAMILY MEDICINE

## 2021-09-21 PROCEDURE — G0008 FLU VACCINE - QUADRIVALENT (RECOMBINANT) PRESERVATIVE FREE: ICD-10-PCS | Mod: S$GLB,,, | Performed by: FAMILY MEDICINE

## 2021-09-21 PROCEDURE — 90682 FLU VACCINE - QUADRIVALENT (RECOMBINANT) PRESERVATIVE FREE: ICD-10-PCS | Mod: S$GLB,,, | Performed by: FAMILY MEDICINE

## 2021-09-21 RX ORDER — GABAPENTIN 300 MG/1
300 CAPSULE ORAL 2 TIMES DAILY
Qty: 180 CAPSULE | Refills: 1 | Status: SHIPPED | OUTPATIENT
Start: 2021-09-21 | End: 2022-01-26 | Stop reason: SDUPTHER

## 2021-09-21 RX ORDER — BENZTROPINE MESYLATE 1 MG/1
1 TABLET ORAL 2 TIMES DAILY
Qty: 180 TABLET | Refills: 2 | Status: SHIPPED | OUTPATIENT
Start: 2021-09-21 | End: 2022-09-14 | Stop reason: SDUPTHER

## 2021-09-21 RX ORDER — PRAVASTATIN SODIUM 40 MG/1
40 TABLET ORAL DAILY
Qty: 90 TABLET | Refills: 1 | Status: SHIPPED | OUTPATIENT
Start: 2021-09-21 | End: 2022-03-14 | Stop reason: SDUPTHER

## 2021-09-21 RX ORDER — PANTOPRAZOLE SODIUM 20 MG/1
20 TABLET, DELAYED RELEASE ORAL DAILY
Qty: 90 TABLET | Refills: 1 | Status: SHIPPED | OUTPATIENT
Start: 2021-09-21 | End: 2022-03-14 | Stop reason: SDUPTHER

## 2021-09-21 RX ORDER — METFORMIN HYDROCHLORIDE 500 MG/1
1000 TABLET, EXTENDED RELEASE ORAL 2 TIMES DAILY WITH MEALS
Qty: 360 TABLET | Refills: 1 | Status: SHIPPED | OUTPATIENT
Start: 2021-09-21 | End: 2021-11-09 | Stop reason: SDUPTHER

## 2021-09-21 RX ORDER — ONDANSETRON 4 MG/1
4 TABLET, ORALLY DISINTEGRATING ORAL DAILY
Qty: 30 TABLET | Refills: 2 | Status: SHIPPED | OUTPATIENT
Start: 2021-09-21 | End: 2023-05-10

## 2021-11-09 DIAGNOSIS — E11.42 DM TYPE 2 WITH DIABETIC PERIPHERAL NEUROPATHY: ICD-10-CM

## 2021-11-09 RX ORDER — METFORMIN HYDROCHLORIDE 500 MG/1
1000 TABLET, EXTENDED RELEASE ORAL 2 TIMES DAILY WITH MEALS
Qty: 360 TABLET | Refills: 1 | Status: SHIPPED | OUTPATIENT
Start: 2021-11-09 | End: 2022-05-23 | Stop reason: SDUPTHER

## 2021-11-09 RX ORDER — LINACLOTIDE 290 UG/1
290 CAPSULE, GELATIN COATED ORAL DAILY
Qty: 90 CAPSULE | Refills: 1 | Status: SHIPPED | OUTPATIENT
Start: 2021-11-09 | End: 2022-05-26 | Stop reason: SDUPTHER

## 2021-11-17 ENCOUNTER — OFFICE VISIT (OUTPATIENT)
Dept: HEMATOLOGY/ONCOLOGY | Facility: CLINIC | Age: 61
End: 2021-11-17
Payer: MEDICARE

## 2021-11-17 ENCOUNTER — TELEPHONE (OUTPATIENT)
Dept: HEMATOLOGY/ONCOLOGY | Facility: CLINIC | Age: 61
End: 2021-11-17

## 2021-11-17 VITALS
BODY MASS INDEX: 30.73 KG/M2 | HEIGHT: 64 IN | SYSTOLIC BLOOD PRESSURE: 121 MMHG | WEIGHT: 180 LBS | HEART RATE: 73 BPM | DIASTOLIC BLOOD PRESSURE: 77 MMHG

## 2021-11-17 DIAGNOSIS — D69.59 OTHER SECONDARY THROMBOCYTOPENIA: ICD-10-CM

## 2021-11-17 DIAGNOSIS — D61.818 PANCYTOPENIA: ICD-10-CM

## 2021-11-17 DIAGNOSIS — D50.9 IRON DEFICIENCY ANEMIA, UNSPECIFIED IRON DEFICIENCY ANEMIA TYPE: Primary | ICD-10-CM

## 2021-11-17 DIAGNOSIS — D53.9 NUTRITIONAL ANEMIA, UNSPECIFIED: ICD-10-CM

## 2021-11-17 DIAGNOSIS — D72.819 LEUKOPENIA, UNSPECIFIED TYPE: ICD-10-CM

## 2021-11-17 PROCEDURE — 99213 PR OFFICE/OUTPT VISIT, EST, LEVL III, 20-29 MIN: ICD-10-PCS | Mod: S$GLB,,, | Performed by: INTERNAL MEDICINE

## 2021-11-17 PROCEDURE — 99213 OFFICE O/P EST LOW 20 MIN: CPT | Mod: S$GLB,,, | Performed by: INTERNAL MEDICINE

## 2021-11-17 RX ORDER — FERROUS GLUCONATE 324(38)MG
324 TABLET ORAL 2 TIMES DAILY
Qty: 60 TABLET | Refills: 6 | Status: SHIPPED | OUTPATIENT
Start: 2021-11-17 | End: 2022-07-06 | Stop reason: SDUPTHER

## 2021-11-18 ENCOUNTER — TELEPHONE (OUTPATIENT)
Dept: FAMILY MEDICINE | Facility: CLINIC | Age: 61
End: 2021-11-18
Payer: MEDICARE

## 2021-11-22 ENCOUNTER — OFFICE VISIT (OUTPATIENT)
Dept: FAMILY MEDICINE | Facility: CLINIC | Age: 61
End: 2021-11-22
Payer: MEDICARE

## 2021-11-22 VITALS
DIASTOLIC BLOOD PRESSURE: 78 MMHG | HEART RATE: 72 BPM | SYSTOLIC BLOOD PRESSURE: 138 MMHG | HEIGHT: 64 IN | WEIGHT: 182 LBS | BODY MASS INDEX: 31.07 KG/M2

## 2021-11-22 DIAGNOSIS — F20.9 SCHIZOPHRENIA, UNSPECIFIED TYPE: ICD-10-CM

## 2021-11-22 DIAGNOSIS — I87.2 VENOUS STASIS DERMATITIS, UNSPECIFIED LATERALITY: ICD-10-CM

## 2021-11-22 DIAGNOSIS — E11.42 DM TYPE 2 WITH DIABETIC PERIPHERAL NEUROPATHY: Primary | ICD-10-CM

## 2021-11-22 LAB — HBA1C MFR BLD: 9.4 %

## 2021-11-22 PROCEDURE — 83036 POCT HEMOGLOBIN A1C: ICD-10-PCS | Mod: QW,,, | Performed by: FAMILY MEDICINE

## 2021-11-22 PROCEDURE — 99214 PR OFFICE/OUTPT VISIT, EST, LEVL IV, 30-39 MIN: ICD-10-PCS | Mod: S$GLB,,, | Performed by: FAMILY MEDICINE

## 2021-11-22 PROCEDURE — 99214 OFFICE O/P EST MOD 30 MIN: CPT | Mod: S$GLB,,, | Performed by: FAMILY MEDICINE

## 2021-11-22 PROCEDURE — 83036 HEMOGLOBIN GLYCOSYLATED A1C: CPT | Mod: QW,,, | Performed by: FAMILY MEDICINE

## 2022-01-26 DIAGNOSIS — E11.42 DM TYPE 2 WITH DIABETIC PERIPHERAL NEUROPATHY: ICD-10-CM

## 2022-01-26 DIAGNOSIS — Z76.0 MEDICATION REFILL: ICD-10-CM

## 2022-01-26 RX ORDER — GABAPENTIN 300 MG/1
300 CAPSULE ORAL 2 TIMES DAILY
Qty: 180 CAPSULE | Refills: 1 | Status: SHIPPED | OUTPATIENT
Start: 2022-01-26 | End: 2022-03-28

## 2022-01-26 NOTE — TELEPHONE ENCOUNTER
The patient's prescription has been approved and sent to   [id number] - [place], [place] - [address] [facility] [address]  [address] [address]  Phone: [phone] Fax: [phone]

## 2022-01-26 NOTE — TELEPHONE ENCOUNTER
----- Message from Monae Bartlett sent at 1/26/2022 10:14 AM CST -----  Pt's wife calling for refill on Gabapentin to [facility]  [phone]

## 2022-03-08 DIAGNOSIS — E11.42 DM TYPE 2 WITH DIABETIC PERIPHERAL NEUROPATHY: ICD-10-CM

## 2022-03-08 RX ORDER — POTASSIUM CHLORIDE 20 MEQ/1
20 TABLET, EXTENDED RELEASE ORAL DAILY
Qty: 90 TABLET | Refills: 1 | Status: SHIPPED | OUTPATIENT
Start: 2022-03-08 | End: 2022-09-14 | Stop reason: SDUPTHER

## 2022-03-08 NOTE — TELEPHONE ENCOUNTER
----- Message from Veena Gong sent at 3/8/2022 10:31 AM CST -----  Mireille ()called and stated that the patient need a refill of his potassium chloride called into [facility] on [address] if any questions please give her a call at [phone]

## 2022-03-09 NOTE — TELEPHONE ENCOUNTER
The patient's prescription has been approved and sent to   [facility][id number] - [place], [place] - [address] [facility] [address]  [address] [address]  Phone: [phone] Fax: [phone]

## 2022-03-14 DIAGNOSIS — Z76.0 MEDICATION REFILL: ICD-10-CM

## 2022-03-14 DIAGNOSIS — K21.9 GASTROESOPHAGEAL REFLUX DISEASE WITHOUT ESOPHAGITIS: ICD-10-CM

## 2022-03-14 RX ORDER — PRAVASTATIN SODIUM 40 MG/1
40 TABLET ORAL DAILY
Qty: 90 TABLET | Refills: 1 | Status: SHIPPED | OUTPATIENT
Start: 2022-03-14 | End: 2022-09-14 | Stop reason: SDUPTHER

## 2022-03-14 RX ORDER — PANTOPRAZOLE SODIUM 20 MG/1
20 TABLET, DELAYED RELEASE ORAL DAILY
Qty: 90 TABLET | Refills: 1 | Status: SHIPPED | OUTPATIENT
Start: 2022-03-14 | End: 2022-09-14 | Stop reason: SDUPTHER

## 2022-03-14 NOTE — TELEPHONE ENCOUNTER
----- Message from Veena Gong sent at 3/14/2022 12:29 PM CDT -----  Patient  (chelo) called and stated that the patient need a refill of his pravastatin and his pantoprazole called into [facility] on [address] [place] if any questions please give her a call at [phone]

## 2022-03-14 NOTE — TELEPHONE ENCOUNTER
The patient's prescription has been approved and sent to   [id number] - [place], [place] - [address] [facility] [address]  [address] [address]  Phone: [phone] Fax: [phone]

## 2022-03-28 ENCOUNTER — OFFICE VISIT (OUTPATIENT)
Dept: FAMILY MEDICINE | Facility: CLINIC | Age: 62
End: 2022-03-28
Payer: MEDICARE

## 2022-03-28 VITALS
WEIGHT: 173 LBS | HEART RATE: 74 BPM | BODY MASS INDEX: 29.53 KG/M2 | SYSTOLIC BLOOD PRESSURE: 132 MMHG | DIASTOLIC BLOOD PRESSURE: 80 MMHG | HEIGHT: 64 IN | OXYGEN SATURATION: 97 %

## 2022-03-28 DIAGNOSIS — K21.9 GASTROESOPHAGEAL REFLUX DISEASE WITHOUT ESOPHAGITIS: ICD-10-CM

## 2022-03-28 DIAGNOSIS — N32.0 BLADDER OUTLET OBSTRUCTION: ICD-10-CM

## 2022-03-28 DIAGNOSIS — F20.9 SCHIZOPHRENIA, UNSPECIFIED TYPE: ICD-10-CM

## 2022-03-28 DIAGNOSIS — M21.611 BUNION, RIGHT: ICD-10-CM

## 2022-03-28 DIAGNOSIS — E11.42 DM TYPE 2 WITH DIABETIC PERIPHERAL NEUROPATHY: Primary | ICD-10-CM

## 2022-03-28 DIAGNOSIS — L84 CALLUS: ICD-10-CM

## 2022-03-28 LAB — HBA1C MFR BLD: 9.6 %

## 2022-03-28 PROCEDURE — 99214 PR OFFICE/OUTPT VISIT, EST, LEVL IV, 30-39 MIN: ICD-10-PCS | Mod: S$GLB,,, | Performed by: FAMILY MEDICINE

## 2022-03-28 PROCEDURE — 1160F PR REVIEW ALL MEDS BY PRESCRIBER/CLIN PHARMACIST DOCUMENTED: ICD-10-PCS | Mod: S$GLB,,, | Performed by: FAMILY MEDICINE

## 2022-03-28 PROCEDURE — 99214 OFFICE O/P EST MOD 30 MIN: CPT | Mod: S$GLB,,, | Performed by: FAMILY MEDICINE

## 2022-03-28 PROCEDURE — 3008F BODY MASS INDEX DOCD: CPT | Mod: S$GLB,,, | Performed by: FAMILY MEDICINE

## 2022-03-28 PROCEDURE — 3046F PR MOST RECENT HEMOGLOBIN A1C LEVEL > 9.0%: ICD-10-PCS | Mod: S$GLB,,, | Performed by: FAMILY MEDICINE

## 2022-03-28 PROCEDURE — 83036 HEMOGLOBIN GLYCOSYLATED A1C: CPT | Mod: QW,,, | Performed by: FAMILY MEDICINE

## 2022-03-28 PROCEDURE — 3008F PR BODY MASS INDEX (BMI) DOCUMENTED: ICD-10-PCS | Mod: S$GLB,,, | Performed by: FAMILY MEDICINE

## 2022-03-28 PROCEDURE — 83036 POCT HEMOGLOBIN A1C: ICD-10-PCS | Mod: QW,,, | Performed by: FAMILY MEDICINE

## 2022-03-28 PROCEDURE — 3046F HEMOGLOBIN A1C LEVEL >9.0%: CPT | Mod: S$GLB,,, | Performed by: FAMILY MEDICINE

## 2022-03-28 PROCEDURE — 1160F RVW MEDS BY RX/DR IN RCRD: CPT | Mod: S$GLB,,, | Performed by: FAMILY MEDICINE

## 2022-03-28 RX ORDER — GABAPENTIN 400 MG/1
1 CAPSULE ORAL 2 TIMES DAILY
COMMUNITY
Start: 2022-03-09 | End: 2022-07-28 | Stop reason: SDUPTHER

## 2022-03-28 NOTE — PROGRESS NOTES
SUBJECTIVE:    Patient ID: Matthieu Jovel is a 62 y.o. male.    Chief Complaint: Follow-up and Diabetes    Pt here to checkup on acute and chronic conditions.    Had labs done: fBS 107, Ferritin 74, GFR 65, Hct 36.1    A1c 9.6%.  Has lost 9 pounds since last visit. Taking Toujeo 25-30 units at night. Brings in log today, though it is only a measure of his blood sugars in the evening and has been using a medium sliding scale.     Has had an increase in his gabapentin to 400mg twice a day for his neuropathy.     Constipation is doing ok is linzess now.     Pt has been having a lesion on his right 2nd toe that has been bothering him. Has been using a product over the counter that helps him. Has been to see Dr. Lazaro, states he was told that he doesn't take care of bunions. Pt c/o of it throbbing constantly and it also bothering him when his walking and when his working etc.     Heartburn is doing ok, taking protonix as needed.  Does get it when he drinks coke.     Continue to see Dr. Pacheco, still doing in/out cath.    No reported changes in psyche med. Not agitated today.  ---------------------------------------------------------------------------  Has had cscope 4 times and has had inadequate prep, so they will not do it anymore.   (Dr. Danielle)        Office Visit on 03/28/2022   Component Date Value Ref Range Status    Hemoglobin A1C 03/28/2022 9.6  % Final   Office Visit on 11/22/2021   Component Date Value Ref Range Status    Hemoglobin A1C 11/22/2021 9.4  % Final   Admission on 10/25/2021, Discharged on 10/25/2021   Component Date Value Ref Range Status    POC WBC 10/25/2021 4.6  3.9 - 12.7 K/uL Final    POC GRA% 10/25/2021 77.5 (A) 38.0 - 73.0 % Final    POC MID% 10/25/2021 4.8  4.0 - 15.0 % Final    POC LYM% 10/25/2021 17.7 (A) 18.0 - 48.0 % Final    POC Gran# 10/25/2021 3.6  1.8 - 7.7 K/uL Final    POC MID# 10/25/2021 0.2 (A) 0.3 - 1.0 K/uL Final    POC LYM# 10/25/2021 0.8 (A) 1.0 - 4.8 K/uL Final     POC RBC 10/25/2021 4.36 (A) 4.60 - 6.20 M/uL Final    POC HGB 10/25/2021 12.8 (A) 14.0 - 18.0 g/dL Final    POC MCV 10/25/2021 84.3  82.0 - 98.0 fl Final    POC HCT 10/25/2021 36.8 (A) 40.0 - 54.0 % Final    POC MCH 10/25/2021 29.4  27.0 - 31.0 pg Final    POC MCHC 10/25/2021 34.9  32.0 - 36.0 g/dL Final    POC RDW% 10/25/2021 12.3  11.5 - 14.5 % Final    POC PLT 10/25/2021 205  150 - 450 K/uL Final    POC MPV 10/25/2021 7.7 (A) 9.2 - 12.9 fl Final    POC Sodium 10/25/2021 135  128 - 145 mmol/L Final    POC Potassium 10/25/2021 4.4  3.6 - 5.1 mmol/L Final    POC Chloride 10/25/2021 102  98 - 108 mmol/L Final    POC CO2 10/25/2021 27  18 - 33 mmol/L Final    POC Glucose 10/25/2021 234 (A) 73 - 118 mg/dL Final    POC BUN 10/25/2021 15  7 - 22 mg/dL Final    POC Creatinine 10/25/2021 1.3 (A) 0.6 - 1.2 mg/dL Final    Calcium, POC 10/25/2021 8.7  8.0 - 10.3 mg/dL Final    Albumin, POC 10/25/2021 3.2 (A) 3.3 - 5.5 g/dL Final    POC ALT 10/25/2021 23  10 - 47 U/L Final    POC AST 10/25/2021 21  11 - 38 U/L Final    Alkaline Phosphatase, POC 10/25/2021 131 (A) 53 - 128 U/L Final    POC TOTAL PROTEIN 10/25/2021 6.4  6.4 - 8.1 g/dL Final    POC TOTAL BILIRUBIN 10/25/2021 0.6  0.2 - 1.6 mg/dL Final    POC eGFR 10/25/2021 57 (A) 61 - 2,000 mL/min Final    POC Hemolysis 10/25/2021 0   Final    Specimen Type 10/25/2021 BLNK   Final   Ancillary Orders on 10/15/2021   Component Date Value Ref Range Status    WBC 11/16/2021 CANCELED  x10E3/uL Final-Edited    RBC 11/16/2021 CANCELED   Final-Edited    Hemoglobin 11/16/2021 CANCELED   Final-Edited    Hematocrit 11/16/2021 CANCELED   Final-Edited    Platelets 11/16/2021 CANCELED   Final-Edited    Neutrophils 11/16/2021 CANCELED   Final-Edited    Lymphs 11/16/2021 CANCELED   Final-Edited    Monocytes 11/16/2021 CANCELED   Final-Edited    Eos 11/16/2021 CANCELED   Final-Edited    Lymphs (Absolute) 11/16/2021 CANCELED   Final-Edited    Eos (Absolute)  2021 CANCELED   Final-Edited    Baso (Absolute) 2021 CANCELED   Final-Edited    Glucose 2021 532 (A) 65 - 99 mg/dL Final    BUN 2021 19  8 - 27 mg/dL Final    Creatinine 2021 1.37 (A) 0.76 - 1.27 mg/dL Final    eGFR if non  2021 56 (A) >59 mL/min/1.73 Final    eGFR if  2021 64  >59 mL/min/1.73 Final    BUN/Creatinine Ratio 2021 14  10 - 24 Final    Sodium 2021 130 (A) 134 - 144 mmol/L Final    Potassium 2021 4.7  3.5 - 5.2 mmol/L Final    Chloride 2021 94 (A) 96 - 106 mmol/L Final    CO2 2021 16 (A) 20 - 29 mmol/L Final    Calcium 2021 9.2  8.6 - 10.2 mg/dL Final    Protein, Total 2021 7.0  6.0 - 8.5 g/dL Final    Albumin 2021 4.1  3.8 - 4.9 g/dL Final    Globulin, Total 2021 2.9  1.5 - 4.5 g/dL Final    Albumin/Globulin Ratio 2021 1.4  1.2 - 2.2 Final    Total Bilirubin 2021 0.3  0.0 - 1.2 mg/dL Final    Alkaline Phosphatase 2021 198 (A) 44 - 121 IU/L Final    AST 2021 30  0 - 40 IU/L Final    ALT 2021 23  0 - 44 IU/L Final    TIBC 2021 287  250 - 450 ug/dL Final    UIBC 2021 235  111 - 343 ug/dL Final    Iron 2021 52  38 - 169 ug/dL Final    Iron Saturation 2021 18  15 - 55 % Final    Ferritin 2021 96  30 - 400 ng/mL Final       Past Medical History:   Diagnosis Date    Development disorder, mixed     Diabetes mellitus type II     Mental and behavioral problems with communication (including speech)      Social History     Socioeconomic History    Marital status: Single   Tobacco Use    Smoking status: Former Smoker     Quit date: 1998     Years since quittin.9    Smokeless tobacco: Never Used   Substance and Sexual Activity    Alcohol use: No    Drug use: No     Past Surgical History:   Procedure Laterality Date    FOOT SURGERY       Family History   Problem Relation Age of Onset     "Glaucoma Mother        Review of patient's allergies indicates:   Allergen Reactions    Codeine     Morphine sulfate Other (See Comments)     Other reaction(s): Unknown       Current Outpatient Medications:     BD ULTRA-FINE MINI PEN NEEDLE 31 gauge x 3/16" Ndle, INJECT 1 PEN NEEDLE UNDER THE SKIN FOUR TIMES DAILY, Disp: 100 each, Rfl: 1    benztropine (COGENTIN) 1 MG tablet, Take 1 tablet (1 mg total) by mouth 2 (two) times daily., Disp: 180 tablet, Rfl: 2    blood sugar diagnostic Strp, To check BG 4 times daily, to use with insurance preferred meter, Disp: 400 strip, Rfl: 1    CONTOUR METER Misc, UTD, Disp: , Rfl: 0    ferrous gluconate (FERGON) 324 MG tablet, Take 1 tablet (324 mg total) by mouth 2 (two) times a day., Disp: 60 tablet, Rfl: 6    FLUoxetine 20 MG capsule, , Disp: , Rfl:     FLUoxetine 40 MG capsule, Take 40 mg by mouth once daily. , Disp: , Rfl:     gabapentin (NEURONTIN) 400 MG capsule, Take 1 capsule by mouth 2 (two) times a day., Disp: , Rfl:     HYDROcodone-acetaminophen (NORCO) 5-325 mg per tablet, Take 1 tablet by mouth every 4 to 6 hours as needed., Disp: , Rfl:     insulin aspart U-100 (NOVOLOG FLEXPEN U-100 INSULIN) 100 unit/mL (3 mL) InPn pen, Inject 15-30 Units into the skin 3 (three) times daily with meals., Disp: 15 mL, Rfl: 2    LATUDA 80 mg Tab tablet, TK 1 T PO BID, Disp: , Rfl: 1    LINZESS 290 mcg Cap capsule, Take 1 capsule (290 mcg total) by mouth once daily., Disp: 90 capsule, Rfl: 1    metFORMIN (GLUCOPHAGE-XR) 500 MG ER 24hr tablet, Take 2 tablets (1,000 mg total) by mouth 2 (two) times daily with meals., Disp: 360 tablet, Rfl: 1    naproxen (NAPROSYN) 375 MG tablet, Take 1 tablet (375 mg total) by mouth 2 (two) times daily with meals., Disp: 30 tablet, Rfl: 0    ondansetron (ZOFRAN-ODT) 4 MG TbDL, Take 1 tablet (4 mg total) by mouth once daily., Disp: 30 tablet, Rfl: 2    OXcarbazepine (TRILEPTAL) 300 MG Tab, Take 1 tablet (300 mg total) by mouth once " "daily., Disp: 90 tablet, Rfl: 1    pantoprazole (PROTONIX) 20 MG tablet, Take 1 tablet (20 mg total) by mouth once daily., Disp: 90 tablet, Rfl: 1    potassium chloride SA (K-DUR,KLOR-CON) 20 MEQ tablet, Take 1 tablet (20 mEq total) by mouth once daily., Disp: 90 tablet, Rfl: 1    pravastatin (PRAVACHOL) 40 MG tablet, Take 1 tablet (40 mg total) by mouth once daily., Disp: 90 tablet, Rfl: 1    quetiapine (SEROQUEL) 300 MG Tab, Take 600 mg by mouth once daily. , Disp: , Rfl:     TOUJEO SOLOSTAR U-300 INSULIN 300 unit/mL (1.5 mL) InPn pen, Inject 30 Units into the skin once daily. INJECT 30 UNITS SQ D, Disp: 1 pen, Rfl: 2    TRUEPLUS LANCETS 33 gauge Misc, 1 lancet by In Vitro route 4 (four) times daily., Disp: 200 each, Rfl: 1    Review of Systems   Constitutional: Negative for appetite change, fatigue, fever and unexpected weight change.   HENT: Negative.    Respiratory: Negative for cough, chest tightness, shortness of breath and wheezing.    Cardiovascular: Positive for leg swelling. Negative for chest pain.   Gastrointestinal: Negative for abdominal pain, constipation, nausea and vomiting.        -heartburn   Genitourinary: Negative for decreased urine volume, difficulty urinating, dysuria and frequency.   Musculoskeletal: Negative for arthralgias, back pain, myalgias and neck pain.        +foot pain   Skin: Negative for rash.   Allergic/Immunologic: Negative.    Neurological: Positive for numbness. Negative for dizziness, weakness and headaches.   Hematological: Does not bruise/bleed easily.   Psychiatric/Behavioral: Positive for agitation. Negative for dysphoric mood, sleep disturbance and suicidal ideas. The patient is not nervous/anxious.    All other systems reviewed and are negative.         Objective:      Vitals:    03/28/22 1051   BP: 132/80   Pulse: 74   SpO2: 97%   Weight: 78.5 kg (173 lb)   Height: 5' 4" (1.626 m)     Wt Readings from Last 3 Encounters:   03/28/22 78.5 kg (173 lb)   11/22/21 " 82.6 kg (182 lb)   21 81.6 kg (180 lb)       Physical Exam  Vitals reviewed.   Constitutional:       General: He is not in acute distress.     Appearance: Normal appearance. He is well-developed.      Comments: obese   HENT:      Head: Normocephalic and atraumatic.   Neck:      Thyroid: No thyromegaly.   Cardiovascular:      Rate and Rhythm: Normal rate and regular rhythm.      Heart sounds: Normal heart sounds. No murmur heard.    No friction rub.   Pulmonary:      Effort: Pulmonary effort is normal.      Breath sounds: Normal breath sounds. No wheezing or rales.   Abdominal:      General: Bowel sounds are normal. There is no distension.      Palpations: Abdomen is soft.      Tenderness: There is no abdominal tenderness.   Musculoskeletal:      Cervical back: Neck supple.      Right lower le+ Edema (mild) present.      Left lower le+ Edema (mild) present.      Right foot: Bunion (medial 2nd toe, no signs of infection) present.   Feet:      Right foot:      Skin integrity: Callus present.      Left foot:      Skin integrity: Callus present.   Lymphadenopathy:      Cervical: No cervical adenopathy.   Skin:     General: Skin is warm and dry.      Findings: Erythema (bilateral lower extremity) present. No rash.   Neurological:      Mental Status: He is alert and oriented to person, place, and time.   Psychiatric:         Attention and Perception: He is attentive.         Mood and Affect: Mood is not anxious.         Speech: Speech is not rapid and pressured.         Behavior: Behavior normal. Behavior is not agitated.         Thought Content: Thought content normal.         Judgment: Judgment normal.           Assessment:       1. DM type 2 with diabetic peripheral neuropathy    2. Schizophrenia, unspecified type    3. Gastroesophageal reflux disease without esophagitis    4. Bladder outlet obstruction    5. Bunion, right    6. Callus         Plan:       DM type 2 with diabetic peripheral  neuropathy  Comments:  Controlled. A1c 9.6%. To continue ADA diet, regular BS checks. To stay active. Will continue to monitor symptoms.  Orders:  -     Hemoglobin A1C, POCT    Schizophrenia, unspecified type  Comments:  Stable. Will continue to monitor symptoms    Gastroesophageal reflux disease without esophagitis  Comments:  Controlled. Will continue to monitor symptoms. To avoid caffeine    Bladder outlet obstruction  Comments:  Controlled. Will continue f/u with Dr. Junior Erickson, right  Comments:  Chronic. Pt advised to see podiatrist as soon as possible. Warned of risk of lois getting infected and risk of losing toe, and/or foot    Callus  Comments:  Chronic. Pt advised not to work on his callus himself. Pt also advised to see podiatrist ASAP.     Other  Lab results discussed and reviewed with patient.    Pt with decreased insight. Unable to keep a good log, follows BS log, has been real labile DM in the past, at risk for hypoglycemia.     Follow up in about 3 months (around 6/28/2022) for DM, HTN.        3/28/2022 Peter Schwartz

## 2022-04-06 DIAGNOSIS — E11.42 DM TYPE 2 WITH DIABETIC PERIPHERAL NEUROPATHY: ICD-10-CM

## 2022-04-06 RX ORDER — INSULIN ASPART 100 [IU]/ML
15-30 INJECTION, SOLUTION INTRAVENOUS; SUBCUTANEOUS
Qty: 15 ML | Refills: 2 | Status: SHIPPED | OUTPATIENT
Start: 2022-04-06 | End: 2022-07-13

## 2022-04-06 RX ORDER — PEN NEEDLE, DIABETIC 31 GX5/16"
NEEDLE, DISPOSABLE MISCELLANEOUS
Qty: 100 EACH | Refills: 1 | Status: SHIPPED | OUTPATIENT
Start: 2022-04-06 | End: 2022-08-11 | Stop reason: SDUPTHER

## 2022-04-06 NOTE — TELEPHONE ENCOUNTER
----- Message from Monae Bartlett sent at 4/6/2022  2:19 PM CDT -----  Pt's friend calling for Refills on Novalog Flex Pen and Pen needles send to [facility] in [facility] # [phone]

## 2022-04-07 ENCOUNTER — TELEPHONE (OUTPATIENT)
Dept: FAMILY MEDICINE | Facility: CLINIC | Age: 62
End: 2022-04-07
Payer: MEDICARE

## 2022-04-07 NOTE — TELEPHONE ENCOUNTER
----- Message from Monae Bartlett sent at 4/7/2022  2:18 PM CDT -----  Pt's wife calling to see if we have submitted the PA for Humalog. Cb # [phone]

## 2022-04-07 NOTE — TELEPHONE ENCOUNTER
prescription sent to   [id number] - [place] - [address] [facility] [address]  [address] [address]  Phone: [phone] Fax: [phone]

## 2022-05-17 ENCOUNTER — TELEPHONE (OUTPATIENT)
Dept: HEMATOLOGY/ONCOLOGY | Facility: CLINIC | Age: 62
End: 2022-05-17

## 2022-05-17 NOTE — TELEPHONE ENCOUNTER
Spoke with pts caregiver, pt was unavailable. She states that I would need to call his  Mireille at [phone]. Called no answer, will call back.  No labs?  Vance

## 2022-05-23 DIAGNOSIS — E11.42 DM TYPE 2 WITH DIABETIC PERIPHERAL NEUROPATHY: ICD-10-CM

## 2022-05-23 RX ORDER — METFORMIN HYDROCHLORIDE 500 MG/1
1000 TABLET, EXTENDED RELEASE ORAL 2 TIMES DAILY WITH MEALS
Qty: 360 TABLET | Refills: 1 | Status: SHIPPED | OUTPATIENT
Start: 2022-05-23 | End: 2022-09-14 | Stop reason: SDUPTHER

## 2022-05-23 NOTE — TELEPHONE ENCOUNTER
The patient's prescription has been approved and sent to   [facility][id number] - [place], [place] - [address] AT [facility] [address]  [address] [address]  Phone: [phone] Fax: [phone]

## 2022-05-23 NOTE — TELEPHONE ENCOUNTER
----- Message from Margi Palm sent at 5/23/2022  9:08 AM CDT -----  Refill Metformin [facility] in [place]. Pt's # [phone]

## 2022-05-26 RX ORDER — LINACLOTIDE 290 UG/1
290 CAPSULE, GELATIN COATED ORAL DAILY
Qty: 90 CAPSULE | Refills: 1 | Status: SHIPPED | OUTPATIENT
Start: 2022-05-26 | End: 2022-09-14 | Stop reason: SDUPTHER

## 2022-05-26 NOTE — TELEPHONE ENCOUNTER
The patient's prescription has been approved and sent to   [facility] #[id number] - [place], [place] - [address] [facility] [address]  [address] [address]  Phone: [phone] Fax: [phone]

## 2022-05-26 NOTE — TELEPHONE ENCOUNTER
----- Message from Margi Palm sent at 5/26/2022  3:06 PM CDT -----  Refill Linzess [facility] in [place]. Pt's # [phone]

## 2022-06-20 NOTE — PROGRESS NOTES
"   [facility] Hematology/Oncology  PROGRESS NOTE - Follow-up Visit    Subjective:       Patient ID:   NAME: Matthieu Jovel : 1960     62 y.o. male    Referring Doc: Efren  Other Physicians:    Chief Complaint:  Iron defic anemia f/u    History of Present Illness:     Patient is being seen today in person in clinic for a regular scheduled follow-up visit      The patient is here with his  Sahra. He is resident of [facility]. He denies any new issues; no SOB, HA's or N/V.     He has been eating ok. He has been having up and down glucose issues since last visit; managed by Dr Schwartz; he sees Dr Schwartz tomorrow    Discussed Covid19 precautions - he had his vaccinations        ROS:   GEN: normal without any fever, night sweats or weight loss  HEENT: normal with no HA's, sore throat, stiff neck, changes in vision  CV: normal with no CP, SOB, PND, PRADO or orthopnea  PULM: normal with no SOB, cough, hemoptysis, sputum or pleuritic pain  GI: normal with no abdominal pain, nausea, vomiting, constipation, diarrhea, melanotic stools, BRBPR, or hematemesis  : normal with no hematuria, dysuria  BREAST: normal with no mass, discharge, pain  SKIN: normal with no rash, erythema, bruising, or swelling    Allergies:    Review of patient's allergies indicates:   Allergen Reactions    Codeine        Medications:    Current Outpatient Medications:     amitriptyline (ELAVIL) 25 MG tablet, Take 1 tablet (25 mg total) by mouth every evening., Disp: 30 tablet, Rfl: 1    BD ULTRA-FINE MINI PEN NEEDLE 31 gauge x 3/16" Ndle, INJECT 1 PEN NEEDLE UNDER THE SKIN FOUR TIMES DAILY, Disp: 100 each, Rfl: 1    benztropine (COGENTIN) 1 MG tablet, Take 1 tablet (1 mg total) by mouth 2 (two) times daily., Disp: 180 tablet, Rfl: 2    blood sugar diagnostic Strp, To check BG 4 times daily, to use with insurance preferred meter, Disp: 400 strip, Rfl: 1    CONTOUR METER Misc, UTD, Disp: , Rfl: 0    ferrous gluconate (FERGON) 324 " MG tablet, Take 1 tablet (324 mg total) by mouth 2 (two) times a day., Disp: 60 tablet, Rfl: 6    fluconazole (DIFLUCAN) 200 MG Tab, Take 200 mg by mouth once daily., Disp: , Rfl:     FLUoxetine 20 MG capsule, , Disp: , Rfl:     FLUoxetine 40 MG capsule, Take 40 mg by mouth once daily. , Disp: , Rfl:     gabapentin (NEURONTIN) 400 MG capsule, Take 1 capsule by mouth 2 (two) times a day., Disp: , Rfl:     HYDROcodone-acetaminophen (NORCO) 5-325 mg per tablet, Take 1 tablet by mouth every 4 to 6 hours as needed., Disp: , Rfl:     insulin aspart U-100 (NOVOLOG FLEXPEN U-100 INSULIN) 100 unit/mL (3 mL) InPn pen, Inject 15-30 Units into the skin 3 (three) times daily with meals., Disp: 15 mL, Rfl: 2    LATUDA 80 mg Tab tablet, TK 1 T PO BID, Disp: , Rfl: 1    LINZESS 290 mcg Cap capsule, Take 1 capsule (290 mcg total) by mouth once daily., Disp: 90 capsule, Rfl: 1    metFORMIN (GLUCOPHAGE-XR) 500 MG ER 24hr tablet, Take 2 tablets (1,000 mg total) by mouth 2 (two) times daily with meals., Disp: 360 tablet, Rfl: 1    mirtazapine (REMERON) 7.5 MG Tab, Take 7.5 mg by mouth nightly., Disp: , Rfl:     naproxen (NAPROSYN) 375 MG tablet, Take 1 tablet (375 mg total) by mouth 2 (two) times daily with meals., Disp: 30 tablet, Rfl: 0    ondansetron (ZOFRAN-ODT) 4 MG TbDL, Take 1 tablet (4 mg total) by mouth once daily., Disp: 30 tablet, Rfl: 2    OXcarbazepine (TRILEPTAL) 300 MG Tab, Take 1 tablet (300 mg total) by mouth once daily., Disp: 90 tablet, Rfl: 1    pantoprazole (PROTONIX) 20 MG tablet, Take 1 tablet (20 mg total) by mouth once daily., Disp: 90 tablet, Rfl: 1    potassium chloride SA (K-DUR,KLOR-CON) 20 MEQ tablet, Take 1 tablet (20 mEq total) by mouth once daily., Disp: 90 tablet, Rfl: 1    pravastatin (PRAVACHOL) 40 MG tablet, Take 1 tablet (40 mg total) by mouth once daily., Disp: 90 tablet, Rfl: 1    quetiapine (SEROQUEL) 300 MG Tab, Take 600 mg by mouth once daily. , Disp: , Rfl:     TOUJEO  "SOLOSTAR U-300 INSULIN 300 unit/mL (1.5 mL) InPn pen, Inject 30 Units into the skin once daily. INJECT 30 UNITS SQ D, Disp: 1 pen, Rfl: 2    TRUEPLUS LANCETS 33 gauge Misc, 1 lancet by In Vitro route 4 (four) times daily., Disp: 200 each, Rfl: 1    PMHx/PSHx Updates:  See patient's last visit with me on 11/17/2021  See H&P on 8/26/2013        Pathology:  none          Objective:     Vitals:  Blood pressure 127/71, pulse 90, resp. rate 18, height 5' 4" (1.626 m), weight 78 kg (172 lb).        Physical Examination:   GEN: no apparent distress, comfortable; AAOx3  HEAD: atraumatic and normocephalic  EYES: no conjunctival pallor or muddiness, no icterus; normal pupil reaction to ambient light  ENT: OMM, no pharyngeal erythema, external bilateral ears WNL; no visible thrush or ulcers  NECK: no masses or swelling, trachea midline, no visible LAD/LN's   CV: no palpitations; no pedal edema; no noticeable JVD or neck vein distension  CHEST: Normal respiratory effort; chest wall breath movements symmetrical; no audible wheezing  ABDOM: non-distended; no bloating  MUSC/Skeletal: ROM normal; joints visibly normal; no deformities or arthropathy  EXTREM: no clubbing, cyanosis, inflammation or swelling  SKIN: no rashes, lesions, ulcers, petechiae or subcutaneous nodules  : no patel  NEURO: moving all 4 extremities; AAOx3; no tremors  PSYCH: normal mood, affect and behavior  LYMPH: no visible LN's or LAD            Labs:        Lab Results   Component Value Date    WBC 5.1 05/18/2022    HGB 15.3 05/18/2022    HCT 46.0 05/18/2022    MCV 91 05/18/2022     (L) 05/18/2022             Lab Results   Component Value Date    IRON 87 05/18/2022    TIBC 399 05/18/2022    FERRITIN 87 05/18/2022       CMP  Sodium   Date Value Ref Range Status   05/18/2022 134 134 - 144 mmol/L Final   10/22/2018 137 134 - 144 mmol/L      Potassium   Date Value Ref Range Status   05/18/2022 5.1 3.5 - 5.2 mmol/L Final     Chloride   Date Value Ref Range " Status   05/18/2022 96 96 - 106 mmol/L Final   10/22/2018 105 98 - 110 mmol/L      CO2   Date Value Ref Range Status   05/18/2022 16 (L) 20 - 29 mmol/L Final     Glucose   Date Value Ref Range Status   05/18/2022 445 (H) 65 - 99 mg/dL Final   10/22/2018 356 (H) 70 - 99 mg/dL      BUN   Date Value Ref Range Status   05/18/2022 23 8 - 27 mg/dL Final     Creatinine   Date Value Ref Range Status   05/18/2022 1.23 0.76 - 1.27 mg/dL Final   10/22/2018 1.13 0.60 - 1.40 mg/dL      Calcium   Date Value Ref Range Status   05/18/2022 9.3 8.6 - 10.2 mg/dL Final     Total Protein   Date Value Ref Range Status   06/15/2021 6.4 6.1 - 8.1 g/dL Final     Albumin   Date Value Ref Range Status   05/18/2022 4.7 3.8 - 4.8 g/dL Final   06/15/2021 4.1 3.6 - 5.1 g/dL Final   10/22/2018 3.7 3.1 - 4.7 g/dL      Total Bilirubin   Date Value Ref Range Status   05/18/2022 0.3 0.0 - 1.2 mg/dL Final     Alkaline Phosphatase   Date Value Ref Range Status   11/13/2020 125 38 - 145 U/L Final     AST   Date Value Ref Range Status   05/18/2022 17 0 - 40 IU/L Final     ALT   Date Value Ref Range Status   05/18/2022 12 0 - 44 IU/L Final     Anion Gap   Date Value Ref Range Status   11/13/2020 5 (L) 8 - 16 mmol/L Final     eGFR if    Date Value Ref Range Status   06/15/2021 87 > OR = 60 mL/min/1.73m2 Final     eGFR if non    Date Value Ref Range Status   11/16/2021 56 (L) >59 mL/min/1.73 Final                 Radiology/Diagnostic Studies:    No results found.    I have reviewed all available lab results and radiology reports.    Assessment/Plan:   (1) 62 y.o. male with diagnosis of psychiatric and mental issues who is a resident of [facility]    (2) Mild chronic pancytopenia suspected secondary to antipsychotic meds  - he had some recent labs in June 2020 and the WBC and platelet count are WN;- his hgb is currently WNL  - iron is adequate with only slight decrease in the ferritn  - he is on oral iron with fergon  bid    4/20/2021:  - latest available labs were from Dec 2020 and they were WNL    11/17/2021:  - latest hgb at 11.7 and adequate for him  - needs iron tablets refilled  - check labs every 6 months in iron panel    6/21/2022:  - latest hgb WNL at 15.3  - plast are 139,000  - iron panel adequate    (3) Iron defic anemia in past    (4) DM         1. Leukopenia, unspecified type     2. Iron deficiency anemia, unspecified iron deficiency anemia type     3. Pancytopenia     4. Other secondary thrombocytopenia           PLAN:  1. Check labs every 6 months; continue oral iron bid (encouraged compliance)  2. F/u with PCP about his glucose and DM  3. encouraged compliance  4.  RTC in 6 months    Fax note to Efren    Total Time spent on patient:    I spent over 15 mins of time with the patient. Reviewed results of the recently ordered labs, tests, reports and studies; made directives with regards to the results. Over half of this time was spent couseling and coordinating care, making treatment and analytical decisions; ordering necessary labs, tests and studies; and discussing treatment options and setting up treatment plan(s) if indicated.          Discussion:       COVID-19 Discussion:    I had long discussion with patient and any applicable family about the COVID-19 coronavirus epidemic and the recommended precautions with regard to cancer and/or hematology patients. I have re-iterated the CDC recommendations for adequate hand washing, use of hand -like products, and coughing into elbow, etc. In addition, especially for our patients who are on chemotherapy and/or our otherwise immunocompromised patients, I have recommended avoidance of crowds, including movie theaters, restaurants, churches, etc. I have recommended avoidance of any sick or symptomatic family members and/or friends. I have also recommended avoidance of any raw and unwashed food products, and general avoidance of food items that have not been  prepared by themselves. The patient has been asked to call us immediately with any symptom developments, issues, questions or other general concerns.        I have explained all of the above in detail and the patient understands all of the current recommendation(s). I have answered all of their questions to the best of my ability and to their complete satisfaction.   The patient is to continue with the current management plan.            Electronically signed by Wing Arechiga MD

## 2022-06-21 ENCOUNTER — OFFICE VISIT (OUTPATIENT)
Dept: HEMATOLOGY/ONCOLOGY | Facility: CLINIC | Age: 62
End: 2022-06-21
Payer: MEDICARE

## 2022-06-21 VITALS
DIASTOLIC BLOOD PRESSURE: 71 MMHG | SYSTOLIC BLOOD PRESSURE: 127 MMHG | HEART RATE: 90 BPM | BODY MASS INDEX: 29.37 KG/M2 | RESPIRATION RATE: 18 BRPM | WEIGHT: 172 LBS | HEIGHT: 64 IN

## 2022-06-21 DIAGNOSIS — D53.9 NUTRITIONAL ANEMIA, UNSPECIFIED: ICD-10-CM

## 2022-06-21 DIAGNOSIS — D50.9 IRON DEFICIENCY ANEMIA, UNSPECIFIED IRON DEFICIENCY ANEMIA TYPE: ICD-10-CM

## 2022-06-21 DIAGNOSIS — D69.59 OTHER SECONDARY THROMBOCYTOPENIA: ICD-10-CM

## 2022-06-21 DIAGNOSIS — D72.819 LEUKOPENIA, UNSPECIFIED TYPE: Primary | ICD-10-CM

## 2022-06-21 DIAGNOSIS — D61.818 PANCYTOPENIA: ICD-10-CM

## 2022-06-21 PROCEDURE — 99213 PR OFFICE/OUTPT VISIT, EST, LEVL III, 20-29 MIN: ICD-10-PCS | Mod: S$GLB,,, | Performed by: INTERNAL MEDICINE

## 2022-06-21 PROCEDURE — 3046F HEMOGLOBIN A1C LEVEL >9.0%: CPT | Mod: CPTII,S$GLB,, | Performed by: INTERNAL MEDICINE

## 2022-06-21 PROCEDURE — 3008F BODY MASS INDEX DOCD: CPT | Mod: CPTII,S$GLB,, | Performed by: INTERNAL MEDICINE

## 2022-06-21 PROCEDURE — 3074F PR MOST RECENT SYSTOLIC BLOOD PRESSURE < 130 MM HG: ICD-10-PCS | Mod: CPTII,S$GLB,, | Performed by: INTERNAL MEDICINE

## 2022-06-21 PROCEDURE — 99213 OFFICE O/P EST LOW 20 MIN: CPT | Mod: S$GLB,,, | Performed by: INTERNAL MEDICINE

## 2022-06-21 PROCEDURE — 3046F PR MOST RECENT HEMOGLOBIN A1C LEVEL > 9.0%: ICD-10-PCS | Mod: CPTII,S$GLB,, | Performed by: INTERNAL MEDICINE

## 2022-06-21 PROCEDURE — 1159F MED LIST DOCD IN RCRD: CPT | Mod: CPTII,S$GLB,, | Performed by: INTERNAL MEDICINE

## 2022-06-21 PROCEDURE — 1160F PR REVIEW ALL MEDS BY PRESCRIBER/CLIN PHARMACIST DOCUMENTED: ICD-10-PCS | Mod: CPTII,S$GLB,, | Performed by: INTERNAL MEDICINE

## 2022-06-21 PROCEDURE — 3078F DIAST BP <80 MM HG: CPT | Mod: CPTII,S$GLB,, | Performed by: INTERNAL MEDICINE

## 2022-06-21 PROCEDURE — 3078F PR MOST RECENT DIASTOLIC BLOOD PRESSURE < 80 MM HG: ICD-10-PCS | Mod: CPTII,S$GLB,, | Performed by: INTERNAL MEDICINE

## 2022-06-21 PROCEDURE — 1159F PR MEDICATION LIST DOCUMENTED IN MEDICAL RECORD: ICD-10-PCS | Mod: CPTII,S$GLB,, | Performed by: INTERNAL MEDICINE

## 2022-06-21 PROCEDURE — 3074F SYST BP LT 130 MM HG: CPT | Mod: CPTII,S$GLB,, | Performed by: INTERNAL MEDICINE

## 2022-06-21 PROCEDURE — 1160F RVW MEDS BY RX/DR IN RCRD: CPT | Mod: CPTII,S$GLB,, | Performed by: INTERNAL MEDICINE

## 2022-06-21 PROCEDURE — 3008F PR BODY MASS INDEX (BMI) DOCUMENTED: ICD-10-PCS | Mod: CPTII,S$GLB,, | Performed by: INTERNAL MEDICINE

## 2022-06-22 ENCOUNTER — OFFICE VISIT (OUTPATIENT)
Dept: FAMILY MEDICINE | Facility: CLINIC | Age: 62
End: 2022-06-22
Payer: MEDICARE

## 2022-06-22 VITALS
WEIGHT: 169 LBS | OXYGEN SATURATION: 97 % | HEIGHT: 64 IN | HEART RATE: 84 BPM | SYSTOLIC BLOOD PRESSURE: 112 MMHG | DIASTOLIC BLOOD PRESSURE: 62 MMHG | BODY MASS INDEX: 28.85 KG/M2

## 2022-06-22 DIAGNOSIS — R42 DIZZINESS: ICD-10-CM

## 2022-06-22 DIAGNOSIS — E11.42 DM TYPE 2 WITH DIABETIC PERIPHERAL NEUROPATHY: Primary | ICD-10-CM

## 2022-06-22 DIAGNOSIS — F20.9 SCHIZOPHRENIA, UNSPECIFIED TYPE: ICD-10-CM

## 2022-06-22 DIAGNOSIS — K21.9 GASTROESOPHAGEAL REFLUX DISEASE WITHOUT ESOPHAGITIS: ICD-10-CM

## 2022-06-22 LAB — HBA1C MFR BLD: 10.7 %

## 2022-06-22 PROCEDURE — 3008F PR BODY MASS INDEX (BMI) DOCUMENTED: ICD-10-PCS | Mod: CPTII,S$GLB,, | Performed by: FAMILY MEDICINE

## 2022-06-22 PROCEDURE — 1160F PR REVIEW ALL MEDS BY PRESCRIBER/CLIN PHARMACIST DOCUMENTED: ICD-10-PCS | Mod: CPTII,S$GLB,, | Performed by: FAMILY MEDICINE

## 2022-06-22 PROCEDURE — 83036 HEMOGLOBIN GLYCOSYLATED A1C: CPT | Mod: QW,,, | Performed by: FAMILY MEDICINE

## 2022-06-22 PROCEDURE — 1159F PR MEDICATION LIST DOCUMENTED IN MEDICAL RECORD: ICD-10-PCS | Mod: CPTII,S$GLB,, | Performed by: FAMILY MEDICINE

## 2022-06-22 PROCEDURE — 3046F HEMOGLOBIN A1C LEVEL >9.0%: CPT | Mod: CPTII,S$GLB,, | Performed by: FAMILY MEDICINE

## 2022-06-22 PROCEDURE — 1160F RVW MEDS BY RX/DR IN RCRD: CPT | Mod: CPTII,S$GLB,, | Performed by: FAMILY MEDICINE

## 2022-06-22 PROCEDURE — 99214 PR OFFICE/OUTPT VISIT, EST, LEVL IV, 30-39 MIN: ICD-10-PCS | Mod: S$GLB,,, | Performed by: FAMILY MEDICINE

## 2022-06-22 PROCEDURE — 99214 OFFICE O/P EST MOD 30 MIN: CPT | Mod: S$GLB,,, | Performed by: FAMILY MEDICINE

## 2022-06-22 PROCEDURE — 83036 POCT HEMOGLOBIN A1C: ICD-10-PCS | Mod: QW,,, | Performed by: FAMILY MEDICINE

## 2022-06-22 PROCEDURE — 3046F PR MOST RECENT HEMOGLOBIN A1C LEVEL > 9.0%: ICD-10-PCS | Mod: CPTII,S$GLB,, | Performed by: FAMILY MEDICINE

## 2022-06-22 PROCEDURE — 3008F BODY MASS INDEX DOCD: CPT | Mod: CPTII,S$GLB,, | Performed by: FAMILY MEDICINE

## 2022-06-22 PROCEDURE — 1159F MED LIST DOCD IN RCRD: CPT | Mod: CPTII,S$GLB,, | Performed by: FAMILY MEDICINE

## 2022-06-22 RX ORDER — MECLIZINE HYDROCHLORIDE 25 MG/1
25 TABLET ORAL DAILY PRN
Qty: 30 TABLET | Refills: 5 | Status: SHIPPED | OUTPATIENT
Start: 2022-06-22 | End: 2023-05-10

## 2022-06-22 NOTE — PROGRESS NOTES
"  SUBJECTIVE:    Patient ID: Matthieu Jovel is a 62 y.o. male.    Chief Complaint: Diabetes (HTN, brought list// SW)    Pt here to checkup on acute and chronic conditions.    Had labs done: fBS 445, Ferritin 87, GFR 67, Hct 46    A1c 10.7%.  Weight is relatively stable. Pt has not been taking his toujeo and initially has issues with taking novolog due to thinking it is supposed to be "as needed".  Has lost 9 pounds since last visit. Taking Toujeo 25-30 units at night.  Spoke with pt at length about how to take meds in relation to his meals, and time of day.      Heartburn is doing ok, taking protonix as needed.    Continue to see Dr. Pacheco, still doing in/out cath.    No reported changes in psyche med. Not agitated today.    Pt has been having issues with dizziness. Lost rx for dizziness that he was given in the ER, and needs a refill. Doesn't have to get up that much at night if he stops drinking water at about 8p. Denies falls since last visit.    Pt recently fired.  States it was because he didn't get a break.  ---------------------------------------------------------------------------  Has had cscope 4 times and has had inadequate prep, so they will not do it anymore.   (Dr. Danielle)        Office Visit on 06/22/2022   Component Date Value Ref Range Status    Hemoglobin A1C 06/22/2022 10.7  % Final   Admission on 06/07/2022, Discharged on 06/07/2022   Component Date Value Ref Range Status    POC WBC 06/07/2022 10.4  3.9 - 12.7 K/uL Final    POC GRA% 06/07/2022 86.3 (A) 38.0 - 73.0 % Final    POC MID% 06/07/2022 3.7 (A) 4.0 - 15.0 % Final    POC LYM% 06/07/2022 10.0 (A) 18.0 - 48.0 % Final    POC Gran# 06/07/2022 9.0 (A) 1.8 - 7.7 K/uL Final    POC MID# 06/07/2022 0.4  0.3 - 1.0 K/uL Final    POC LYM# 06/07/2022 1.0  1.0 - 4.8 K/uL Final    POC RBC 06/07/2022 4.00 (A) 4.60 - 6.20 M/uL Final    POC HGB 06/07/2022 11.8 (A) 14.0 - 18.0 g/dL Final    POC MCV 06/07/2022 81.7 (A) 82.0 - 98.0 fl Final    " POC HCT 06/07/2022 32.7 (A) 40.0 - 54.0 % Final    POC MCH 06/07/2022 29.7  27.0 - 31.0 pg Final    POC MCHC 06/07/2022 36.3 (A) 32.0 - 36.0 g/dL Final    POC RDW% 06/07/2022 11.9  11.5 - 14.5 % Final    POC PLT 06/07/2022 396  150 - 450 K/uL Final    POC MPV 06/07/2022 7.8 (A) 9.2 - 12.9 fl Final    POC Sodium 06/07/2022 133  128 - 145 mmol/L Final    POC Potassium 06/07/2022 4.5  3.6 - 5.1 mmol/L Final    POC Chloride 06/07/2022 95 (A) 98 - 108 mmol/L Final    POC CO2 06/07/2022 26  18 - 33 mmol/L Final    POC Glucose 06/07/2022 327 (A) 73 - 118 mg/dL Final    POC BUN 06/07/2022 20  7 - 22 mg/dL Final    POC Creatinine 06/07/2022 1.2  0.6 - 1.2 mg/dL Final    Calcium, POC 06/07/2022 9.6  8.0 - 10.3 mg/dL Final    Albumin, POC 06/07/2022 3.0 (A) 3.3 - 5.5 g/dL Final    POC ALT 06/07/2022 17  10 - 47 U/L Final    POC AST 06/07/2022 20  11 - 38 U/L Final    Alkaline Phosphatase, POC 06/07/2022 151 (A) 53 - 128 U/L Final    POC TOTAL PROTEIN 06/07/2022 7.6  6.4 - 8.1 g/dL Final    POC TOTAL BILIRUBIN 06/07/2022 0.4  0.2 - 1.6 mg/dL Final    POC eGFR 06/07/2022 >60  61 - 2,000 mL/min Final    POC Hemolysis 06/07/2022 1+   Final    Specimen Type 06/07/2022 BLNK   Final    POCT Glucose 06/07/2022 361 (A) 70 - 110 mg/dL Final    POCT Glucose 06/07/2022 158 (A) 70 - 110 mg/dL Final   Ancillary Orders on 05/13/2022   Component Date Value Ref Range Status    WBC 05/18/2022 5.1  3.4 - 10.8 x10E3/uL Final    RBC 05/18/2022 5.05  4.14 - 5.80 x10E6/uL Final    Hemoglobin 05/18/2022 15.3  13.0 - 17.7 g/dL Final    Hematocrit 05/18/2022 46.0  37.5 - 51.0 % Final    MCV 05/18/2022 91  79 - 97 fL Final    MCH 05/18/2022 30.3  26.6 - 33.0 pg Final    MCHC 05/18/2022 33.3  31.5 - 35.7 g/dL Final    RDW 05/18/2022 13.9  11.6 - 15.4 % Final    Platelets 05/18/2022 139 (A) 150 - 450 x10E3/uL Final    Neutrophils 05/18/2022 77  Not Estab. % Final    Lymphs 05/18/2022 13  Not Estab. % Final    Monocytes  05/18/2022 5  Not Estab. % Final    Eos 05/18/2022 2  Not Estab. % Final    Basos 05/18/2022 2  Not Estab. % Final    Neutrophils (Absolute) 05/18/2022 4.0  1.4 - 7.0 x10E3/uL Final    Lymphs (Absolute) 05/18/2022 0.7  0.7 - 3.1 x10E3/uL Final    Monocytes(Absolute) 05/18/2022 0.2  0.1 - 0.9 x10E3/uL Final    Eos (Absolute) 05/18/2022 0.1  0.0 - 0.4 x10E3/uL Final    Baso (Absolute) 05/18/2022 0.1  0.0 - 0.2 x10E3/uL Final    Immature Granulocytes 05/18/2022 1  Not Estab. % Final    Immature Grans (Abs) 05/18/2022 0.1  0.0 - 0.1 x10E3/uL Final    Glucose 05/18/2022 445 (A) 65 - 99 mg/dL Final    BUN 05/18/2022 23  8 - 27 mg/dL Final    Creatinine 05/18/2022 1.23  0.76 - 1.27 mg/dL Final    eGFR no Race variable 05/18/2022 67  >59 mL/min/1.73 Final    BUN/Creatinine Ratio 05/18/2022 19  10 - 24 Final    Sodium 05/18/2022 134  134 - 144 mmol/L Final    Potassium 05/18/2022 5.1  3.5 - 5.2 mmol/L Final    Chloride 05/18/2022 96  96 - 106 mmol/L Final    CO2 05/18/2022 16 (A) 20 - 29 mmol/L Final    Calcium 05/18/2022 9.3  8.6 - 10.2 mg/dL Final    Protein, Total 05/18/2022 7.0  6.0 - 8.5 g/dL Final    Albumin 05/18/2022 4.7  3.8 - 4.8 g/dL Final    Globulin, Total 05/18/2022 2.3  1.5 - 4.5 g/dL Final    Albumin/Globulin Ratio 05/18/2022 2.0  1.2 - 2.2 Final    Total Bilirubin 05/18/2022 0.3  0.0 - 1.2 mg/dL Final    Alkaline Phosphatase 05/18/2022 149 (A) 44 - 121 IU/L Final    AST 05/18/2022 17  0 - 40 IU/L Final    ALT 05/18/2022 12  0 - 44 IU/L Final    TIBC 05/18/2022 399  250 - 450 ug/dL Final    UIBC 05/18/2022 312  111 - 343 ug/dL Final    Iron 05/18/2022 87  38 - 169 ug/dL Final    Iron Saturation 05/18/2022 22  15 - 55 % Final    Ferritin 05/18/2022 87  30 - 400 ng/mL Final    Vitamin B-12 05/18/2022 WILL FOLLOW   Preliminary    Folate 05/18/2022 CANCELED  ng/mL Final-Edited   Office Visit on 03/28/2022   Component Date Value Ref Range Status    Hemoglobin A1C 03/28/2022 9.6   "% Final       Past Medical History:   Diagnosis Date    Development disorder, mixed     Diabetes mellitus type II     Mental and behavioral problems with communication (including speech)      Social History     Socioeconomic History    Marital status: Single   Tobacco Use    Smoking status: Former Smoker     Quit date: 1998     Years since quittin.1    Smokeless tobacco: Never Used   Substance and Sexual Activity    Alcohol use: No    Drug use: No     Past Surgical History:   Procedure Laterality Date    FOOT SURGERY       Family History   Problem Relation Age of Onset    Glaucoma Mother        Review of patient's allergies indicates:   Allergen Reactions    Codeine     Morphine sulfate Other (See Comments)     Other reaction(s): Unknown       Current Outpatient Medications:     amitriptyline (ELAVIL) 25 MG tablet, Take 1 tablet (25 mg total) by mouth every evening., Disp: 30 tablet, Rfl: 1    BD ULTRA-FINE MINI PEN NEEDLE 31 gauge x 3/16" Ndle, INJECT 1 PEN NEEDLE UNDER THE SKIN FOUR TIMES DAILY, Disp: 100 each, Rfl: 1    benztropine (COGENTIN) 1 MG tablet, Take 1 tablet (1 mg total) by mouth 2 (two) times daily., Disp: 180 tablet, Rfl: 2    blood sugar diagnostic Strp, To check BG 4 times daily, to use with insurance preferred meter, Disp: 400 strip, Rfl: 1    CONTOUR METER Misc, UTD, Disp: , Rfl: 0    ferrous gluconate (FERGON) 324 MG tablet, Take 1 tablet (324 mg total) by mouth 2 (two) times a day., Disp: 60 tablet, Rfl: 6    fluconazole (DIFLUCAN) 200 MG Tab, Take 200 mg by mouth once daily., Disp: , Rfl:     FLUoxetine 20 MG capsule, , Disp: , Rfl:     FLUoxetine 40 MG capsule, Take 40 mg by mouth once daily. , Disp: , Rfl:     gabapentin (NEURONTIN) 400 MG capsule, Take 1 capsule by mouth 2 (two) times a day., Disp: , Rfl:     HYDROcodone-acetaminophen (NORCO) 5-325 mg per tablet, Take 1 tablet by mouth every 4 to 6 hours as needed., Disp: , Rfl:     insulin aspart U-100 " (NOVOLOG FLEXPEN U-100 INSULIN) 100 unit/mL (3 mL) InPn pen, Inject 15-30 Units into the skin 3 (three) times daily with meals., Disp: 15 mL, Rfl: 2    LATUDA 80 mg Tab tablet, TK 1 T PO BID, Disp: , Rfl: 1    LINZESS 290 mcg Cap capsule, Take 1 capsule (290 mcg total) by mouth once daily., Disp: 90 capsule, Rfl: 1    metFORMIN (GLUCOPHAGE-XR) 500 MG ER 24hr tablet, Take 2 tablets (1,000 mg total) by mouth 2 (two) times daily with meals., Disp: 360 tablet, Rfl: 1    mirtazapine (REMERON) 7.5 MG Tab, Take 7.5 mg by mouth nightly., Disp: , Rfl:     naproxen (NAPROSYN) 375 MG tablet, Take 1 tablet (375 mg total) by mouth 2 (two) times daily with meals., Disp: 30 tablet, Rfl: 0    ondansetron (ZOFRAN-ODT) 4 MG TbDL, Take 1 tablet (4 mg total) by mouth once daily., Disp: 30 tablet, Rfl: 2    OXcarbazepine (TRILEPTAL) 300 MG Tab, Take 1 tablet (300 mg total) by mouth once daily., Disp: 90 tablet, Rfl: 1    pantoprazole (PROTONIX) 20 MG tablet, Take 1 tablet (20 mg total) by mouth once daily., Disp: 90 tablet, Rfl: 1    potassium chloride SA (K-DUR,KLOR-CON) 20 MEQ tablet, Take 1 tablet (20 mEq total) by mouth once daily., Disp: 90 tablet, Rfl: 1    pravastatin (PRAVACHOL) 40 MG tablet, Take 1 tablet (40 mg total) by mouth once daily., Disp: 90 tablet, Rfl: 1    quetiapine (SEROQUEL) 300 MG Tab, Take 300 mg by mouth once daily., Disp: , Rfl:     TOUJEO SOLOSTAR U-300 INSULIN 300 unit/mL (1.5 mL) InPn pen, Inject 30 Units into the skin once daily. INJECT 30 UNITS SQ D, Disp: 1 pen, Rfl: 2    meclizine (ANTIVERT) 25 mg tablet, Take 1 tablet (25 mg total) by mouth daily as needed for Dizziness., Disp: 30 tablet, Rfl: 5    TRUEPLUS LANCETS 33 gauge Misc, 1 lancet by In Vitro route 4 (four) times daily., Disp: 200 each, Rfl: 1    Review of Systems   Constitutional: Negative for appetite change, fatigue, fever and unexpected weight change.   HENT: Negative.    Respiratory: Negative for cough, chest tightness,  "shortness of breath and wheezing.    Cardiovascular: Positive for leg swelling. Negative for chest pain.   Gastrointestinal: Negative for abdominal pain, constipation, nausea and vomiting.        -heartburn   Genitourinary: Negative for decreased urine volume, difficulty urinating, dysuria and frequency.   Musculoskeletal: Negative for arthralgias, back pain, myalgias and neck pain.        +foot pain   Skin: Negative for rash.   Allergic/Immunologic: Negative.    Neurological: Positive for dizziness and numbness (feet). Negative for weakness and headaches.   Hematological: Does not bruise/bleed easily.   Psychiatric/Behavioral: Positive for agitation. Negative for dysphoric mood, sleep disturbance and suicidal ideas. The patient is not nervous/anxious.    All other systems reviewed and are negative.         Objective:      Vitals:    22 1101   BP: 112/62   Pulse: 84   SpO2: 97%   Weight: 76.7 kg (169 lb)   Height: 5' 4" (1.626 m)     Wt Readings from Last 3 Encounters:   22 76.7 kg (169 lb)   22 78 kg (172 lb)   22 76.4 kg (168 lb 6.9 oz)       Physical Exam  Vitals reviewed.   Constitutional:       General: He is not in acute distress.     Appearance: Normal appearance. He is well-developed.      Comments: obese   HENT:      Head: Normocephalic and atraumatic.   Neck:      Thyroid: No thyromegaly.   Cardiovascular:      Rate and Rhythm: Normal rate and regular rhythm.      Heart sounds: Normal heart sounds. No murmur heard.    No friction rub.   Pulmonary:      Effort: Pulmonary effort is normal.      Breath sounds: Normal breath sounds. No wheezing or rales.   Abdominal:      General: Bowel sounds are normal. There is no distension.      Palpations: Abdomen is soft.      Tenderness: There is no abdominal tenderness.   Musculoskeletal:      Cervical back: Neck supple.      Right lower le+ Edema (mild) present.      Left lower le+ Edema (mild) present.   Lymphadenopathy:      " Cervical: No cervical adenopathy.   Skin:     General: Skin is warm and dry.      Findings: Erythema (bilateral lower extremity) present. No rash.   Neurological:      Mental Status: He is alert and oriented to person, place, and time.   Psychiatric:         Attention and Perception: He is attentive.         Mood and Affect: Mood is anxious.         Speech: Speech is rapid and pressured.         Behavior: Behavior normal. Behavior is not agitated.         Thought Content: Thought content normal.         Cognition and Memory: Cognition and memory normal.         Judgment: Judgment is impulsive.           Assessment:       1. DM type 2 with diabetic peripheral neuropathy    2. Gastroesophageal reflux disease without esophagitis    3. Schizophrenia, unspecified type    4. Dizziness         Plan:       DM type 2 with diabetic peripheral neuropathy  Comments:  Uncontrolled. 10.7%. Encouraged ADA diet, regular BS.  Discussed insulin use in regards to meals. Will continue to monitor A1c.  Orders:  -     Hemoglobin A1C, POCT    Gastroesophageal reflux disease without esophagitis  Comments:  Controlled. Will continue to monitor symptoms.    Schizophrenia, unspecified type  Comments:  Controlled. Will continue to monitor symptoms    Dizziness  Comments:  Chronic.  To resume meclizine  Orders:  -     meclizine (ANTIVERT) 25 mg tablet; Take 1 tablet (25 mg total) by mouth daily as needed for Dizziness.  Dispense: 30 tablet; Refill: 5    Other  Lab results discussed and reviewed with patient.    Follow up in about 3 months (around 9/22/2022) for DM, GERD,schizo, dizziness.        6/22/2022 Peter Schwartz

## 2022-07-07 ENCOUNTER — TELEPHONE (OUTPATIENT)
Dept: ENDOCRINOLOGY | Facility: CLINIC | Age: 62
End: 2022-07-07
Payer: MEDICARE

## 2022-07-07 NOTE — TELEPHONE ENCOUNTER
S/w pts sister. Scheduled 1st available with FABY Richardson. Advised that pt needs to bring logs to visit. Pts sister verbalized understanding of date/time/location.

## 2022-07-13 ENCOUNTER — OFFICE VISIT (OUTPATIENT)
Dept: ENDOCRINOLOGY | Facility: CLINIC | Age: 62
End: 2022-07-13
Payer: MEDICARE

## 2022-07-13 ENCOUNTER — LAB VISIT (OUTPATIENT)
Dept: LAB | Facility: HOSPITAL | Age: 62
End: 2022-07-13
Payer: MEDICARE

## 2022-07-13 VITALS
DIASTOLIC BLOOD PRESSURE: 70 MMHG | BODY MASS INDEX: 28.55 KG/M2 | SYSTOLIC BLOOD PRESSURE: 130 MMHG | HEART RATE: 92 BPM | HEIGHT: 64 IN | WEIGHT: 167.25 LBS

## 2022-07-13 DIAGNOSIS — E11.40 TYPE 2 DIABETES MELLITUS WITH DIABETIC NEUROPATHY, WITH LONG-TERM CURRENT USE OF INSULIN: Primary | ICD-10-CM

## 2022-07-13 DIAGNOSIS — E11.42 DM TYPE 2 WITH DIABETIC PERIPHERAL NEUROPATHY: ICD-10-CM

## 2022-07-13 DIAGNOSIS — F89 DEVELOPMENTAL DISORDER: ICD-10-CM

## 2022-07-13 DIAGNOSIS — E11.40 TYPE 2 DIABETES MELLITUS WITH DIABETIC NEUROPATHY, WITH LONG-TERM CURRENT USE OF INSULIN: ICD-10-CM

## 2022-07-13 DIAGNOSIS — Z79.4 TYPE 2 DIABETES MELLITUS WITH DIABETIC NEUROPATHY, WITH LONG-TERM CURRENT USE OF INSULIN: Primary | ICD-10-CM

## 2022-07-13 DIAGNOSIS — R60.0 BILATERAL LOWER EXTREMITY EDEMA: ICD-10-CM

## 2022-07-13 DIAGNOSIS — Z79.4 TYPE 2 DIABETES MELLITUS WITH DIABETIC NEUROPATHY, WITH LONG-TERM CURRENT USE OF INSULIN: ICD-10-CM

## 2022-07-13 LAB — GLUCOSE SERPL-MCNC: 69 MG/DL (ref 70–110)

## 2022-07-13 PROCEDURE — 99205 OFFICE O/P NEW HI 60 MIN: CPT | Mod: S$GLB,,, | Performed by: NURSE PRACTITIONER

## 2022-07-13 PROCEDURE — 86341 ISLET CELL ANTIBODY: CPT | Mod: 91 | Performed by: NURSE PRACTITIONER

## 2022-07-13 PROCEDURE — 84681 ASSAY OF C-PEPTIDE: CPT | Performed by: NURSE PRACTITIONER

## 2022-07-13 PROCEDURE — 99999 PR PBB SHADOW E&M-EST. PATIENT-LVL V: ICD-10-PCS | Mod: PBBFAC,,, | Performed by: NURSE PRACTITIONER

## 2022-07-13 PROCEDURE — 82947 ASSAY GLUCOSE BLOOD QUANT: CPT | Performed by: NURSE PRACTITIONER

## 2022-07-13 PROCEDURE — 86341 ISLET CELL ANTIBODY: CPT | Performed by: NURSE PRACTITIONER

## 2022-07-13 PROCEDURE — 36415 COLL VENOUS BLD VENIPUNCTURE: CPT | Mod: PN | Performed by: NURSE PRACTITIONER

## 2022-07-13 PROCEDURE — 99999 PR PBB SHADOW E&M-EST. PATIENT-LVL V: CPT | Mod: PBBFAC,,, | Performed by: NURSE PRACTITIONER

## 2022-07-13 PROCEDURE — 99205 PR OFFICE/OUTPT VISIT, NEW, LEVL V, 60-74 MIN: ICD-10-PCS | Mod: S$GLB,,, | Performed by: NURSE PRACTITIONER

## 2022-07-13 PROCEDURE — 99215 OFFICE O/P EST HI 40 MIN: CPT | Mod: PBBFAC,PN | Performed by: NURSE PRACTITIONER

## 2022-07-13 RX ORDER — INSULIN ASPART 100 [IU]/ML
10 INJECTION, SOLUTION INTRAVENOUS; SUBCUTANEOUS
Qty: 15 ML | Refills: 2
Start: 2022-07-13 | End: 2022-07-21 | Stop reason: SDUPTHER

## 2022-07-13 RX ORDER — CEPHALEXIN 500 MG/1
500 CAPSULE ORAL 3 TIMES DAILY
COMMUNITY
Start: 2022-07-05 | End: 2022-12-29

## 2022-07-13 NOTE — TELEPHONE ENCOUNTER
S/w sister and notified her Danyell will send refill today before day is over. Sister verb understanding

## 2022-07-13 NOTE — TELEPHONE ENCOUNTER
----- Message from Renetta Fernandez sent at 7/13/2022  4:19 PM CDT -----  Contact: [phone]  Type: Needs Medical Advice  Who Called: Pt    Best Call Back Number: [phone]    Additional Information: Pts sister calling about test strips not sent into pharmacy.   [facility] #[id number] [facility] [address] [place] [id number]  Phone: [phone] Fax: [phone]

## 2022-07-13 NOTE — PROGRESS NOTES
CC: . Matthieu Jovel arrives today for management of Type 2 DM and review of chronic medical conditions, as listed in the Visit Diagnosis section of this encounter.       HPI: . Matthieu Jovel was diagnosed with Type 2 DM at age 36. He was diagnosed based on lab work. Initial treatment consisted of orals. Insulin was added later. + FH of DM in mother. He does report hospitalizations due to DM.     This is his first time being seen in endocrine.     He is accompanied by his sister. Patient has some developmental delay. He lives in a group home. He is agitated today when it comes to discussing his insulin regimen.     BG readings are checked 3x/day.     Hypoglycemia: Rare. Patient cannot determine a pattern. Reports as low as 40.  Hypoglycemic Symptoms: jitteriness and sweating  Hypoglycemia Treatment: juice      Missing Insulin/PO medication doses: Denies.  Timing prandial insulin 5-15 minutes before meals: unable to determine     Exercise: No    Dietary Habits: Eats 3 meals/day. Snacks on chips. Drinks mostly Diet Coke.     Last DM education appointment:    He has ongoing bilateral leg swelling that he reports is worse at night. He states that he has reported this to PCP over the past several months. Had US of bilateral LE veins which was normal in 10/2021. Had been treated for cellulitis of lower extremities around the same time but the redness and swelling didn't improve. He completed course of Keflex this week and also recently took Bactrim for UTI. These did not have effect on the redness in his legs.       CURRENT DIABETIC MEDS: metformin XR 1000 mg BID, Toujeo 25-30 units QHS, Novolog 10-20 units?? Patient has trouble recalling.   Vial or pen: pen  Glucometer type: One Touch Verio    Previous DM treatments:    Last Eye Exam: 2021, no DRKirt Unsure of provider  Last Podiatry Exam: 2022, Unsure of provider    REVIEW OF SYSTEMS  Constitutional: no c/o fatigue, weakness. + weight loss but reports that he is  "gaining this back.   Eyes: denies visual disturbances.  Cardiac: no palpitations or chest pain.  Respiratory: no cough or dyspnea.  GI: no c/o abdominal pain or nausea. Denies h/o pancreatitis.   : reports having suprapubic catheter (unsure of reason). Follows with Dr. Pacheco.   Skin: C/o swelling and redness to BLE from ankles up. This started a few months ago. Swelling is worse at night. He using a pill that he can't remember the name of.  Was also given a topical to apply.  Neuro: + numbness, tingling, parasthesias in feet that he states makes it difficult to walk.   Endocrine: denies polyphagia, polydipsia, polyuria      Personally reviewed Past Medical, Surgical, Social History.    Vital Signs  /70   Pulse 92   Ht 5' 4" (1.626 m)   Wt 75.8 kg (167 lb 3.5 oz)   BMI 28.70 kg/m²     Personally reviewed the below labs:    Hemoglobin A1C   Date Value Ref Range Status   06/22/2022 10.7 % Final   03/28/2022 9.6 % Final   11/22/2021 9.4 % Final   06/15/2021 7.6 (H) <5.7 % of total Hgb Final     Comment:     For someone without known diabetes, a hemoglobin A1c  value of 6.5% or greater indicates that they may have   diabetes and this should be confirmed with a follow-up   test.     For someone with known diabetes, a value <7% indicates   that their diabetes is well controlled and a value   greater than or equal to 7% indicates suboptimal   control. A1c targets should be individualized based on   duration of diabetes, age, comorbid conditions, and   other considerations.     Currently, no consensus exists regarding use of  hemoglobin A1c for diagnosis of diabetes for children.         05/18/2020 9.4 (H) <5.7 % of total Hgb Final     Comment:     For someone without known diabetes, a hemoglobin A1c  value of 6.5% or greater indicates that they may have   diabetes and this should be confirmed with a follow-up   test.     For someone with known diabetes, a value <7% indicates   that their diabetes is well " controlled and a value   greater than or equal to 7% indicates suboptimal   control. A1c targets should be individualized based on   duration of diabetes, age, comorbid conditions, and   other considerations.     Currently, no consensus exists regarding use of  hemoglobin A1c for diagnosis of diabetes for children.         09/24/2018 8.8 % Final       Chemistry        Component Value Date/Time     05/18/2022 0804     10/22/2018 1106    K 5.1 05/18/2022 0804    CL 96 05/18/2022 0804     10/22/2018 1106    CO2 16 (L) 05/18/2022 0804    BUN 23 05/18/2022 0804    CREATININE 1.23 05/18/2022 0804    CREATININE 1.13 10/22/2018 1106     (H) 05/18/2022 0804     (H) 10/22/2018 1106        Component Value Date/Time    CALCIUM 9.3 05/18/2022 0804    ALKPHOS 125 11/13/2020 1300    AST 17 05/18/2022 0804    ALT 12 05/18/2022 0804    BILITOT 0.3 05/18/2022 0804    ESTGFRAFRICA 87 06/15/2021 1134    EGFRNONAA 56 (L) 11/16/2021 1043          Lab Results   Component Value Date    CHOL 137 06/15/2021    CHOL 147 05/18/2020     Lab Results   Component Value Date    HDL 66 06/15/2021    HDL 59 05/18/2020     Lab Results   Component Value Date    LDLCALC 55 06/15/2021    LDLCALC 67 05/18/2020     Lab Results   Component Value Date    TRIG 77 06/15/2021    TRIG 126 05/18/2020     Lab Results   Component Value Date    CHOLHDL 2.1 06/15/2021    CHOLHDL 2.5 05/18/2020       Lab Results   Component Value Date    MICALBCREAT 9 06/15/2021     No results found for: TSH    CrCl cannot be calculated (Patient's most recent lab result is older than the maximum 7 days allowed.).    No results found for: [id number]      PHYSICAL EXAMINATION  Constitutional: Appears well, no distress  Neck: Supple, trachea midline.  Respiratory: CTA, even and unlabored.  Cardiovascular: RRR, no murmurs, no carotid bruits. 1+ BLE edema with dermopathic skin changes noted.   GI: bowel sounds active, no hernia noted.  Skin: warm and dry;  no visible wounds.  Neuro: oriented to person, place, time.  Feet: appropriate footwear.       Goals      HEMOGLOBIN A1C < 8             Assessment/Plan  1. Type 2 diabetes mellitus with diabetic neuropathy, with long-term current use of insulin  -- Glucoses sound to be be highly variable. Will check C-peptide, KARL, anti-islet cell ab, random glucose today to rule out RADHA. Unsure of how he is truly dosing his Novolog. Will redistribute insulin dose to more of an even split between basal/bolus.   -- continue Toujeo 30 units QHS  -- take Novolog 10 units with meals + correction scale, target 150, ISF 25. Spent ~ 20 min explaining how to correctly use this scale. He confirmed understanding with examples.   -- will order FreestEnzymeRx Edilberto 2 through DME. Explained proper use.     -- Reviewed hypoglycemia management: treat with 4 oz of juice, 4 oz regular soda, or 4 glucose tablets. Monitor and repeat treatment every 15 minutes until BG is >70 Then have a snack, which includes 15 grams of complex carbohydrates and protein.    2. Developmental disorder  -- complicating understanding of insulin dosing   3. Bilateral lower extremity edema  -- cannot determine if pain with walking is related to claudication or neuropathy. Due to risk factors, will order imaging.  -- US Lower Extrem Arteries Bilat with DULCE (xpd)  -- swelling has been an ongoing issue since last fall when venous US was completed and no abnormality was appreciated.   -- likely venous stasis dermatitis. He would like to establish care with new PCP.       Total Time and Counselin minutes, >50% time spent counseling as noted above in #1 A/P. PATIENT NEEDS 60 MIN SLOT FOR FUTURE DM FOLLOW UPS.      FOLLOW UP  Follow up in about 4 weeks (around 8/10/2022).   Patient instructed to bring BG logs to each follow up   Patient encouraged to call for any BG/medication issues, concerns, or questions.    Orders Placed This Encounter   Procedures    US Lower Extrem  Arteries Bilat with DULCE (xpd)    C-Peptide    Glutamic Acid Decarboxylase    Anti-islet cell antibody    Glucose, Random

## 2022-07-14 ENCOUNTER — TELEPHONE (OUTPATIENT)
Dept: ENDOCRINOLOGY | Facility: CLINIC | Age: 62
End: 2022-07-14
Payer: MEDICARE

## 2022-07-14 LAB — C PEPTIDE SERPL-MCNC: 0.4 NG/ML (ref 0.78–5.19)

## 2022-07-17 LAB — PANC ISLET CELL IGG SER-ACNC: NORMAL

## 2022-07-19 ENCOUNTER — HOSPITAL ENCOUNTER (OUTPATIENT)
Dept: RADIOLOGY | Facility: HOSPITAL | Age: 62
Discharge: HOME | End: 2022-07-19
Attending: NURSE PRACTITIONER
Payer: MEDICARE

## 2022-07-19 DIAGNOSIS — I73.9 PAD (PERIPHERAL ARTERY DISEASE): ICD-10-CM

## 2022-07-19 DIAGNOSIS — R60.0 BILATERAL LOWER EXTREMITY EDEMA: ICD-10-CM

## 2022-07-19 DIAGNOSIS — R60.0 BILATERAL LOWER EXTREMITY EDEMA: Primary | ICD-10-CM

## 2022-07-19 PROCEDURE — 93925 LOWER EXTREMITY STUDY: CPT | Mod: 26,,, | Performed by: RADIOLOGY

## 2022-07-19 PROCEDURE — 93922 UPR/L XTREMITY ART 2 LEVELS: CPT | Mod: 26,,, | Performed by: RADIOLOGY

## 2022-07-19 PROCEDURE — 93925 US ARTERIAL LOWER EXTREMITY BILAT WITH ABI (XPD): ICD-10-PCS | Mod: 26,,, | Performed by: RADIOLOGY

## 2022-07-19 PROCEDURE — 93922 US ARTERIAL LOWER EXTREMITY BILAT WITH ABI (XPD): ICD-10-PCS | Mod: 26,,, | Performed by: RADIOLOGY

## 2022-07-19 PROCEDURE — 93925 LOWER EXTREMITY STUDY: CPT | Mod: TC,PO

## 2022-07-20 ENCOUNTER — TELEPHONE (OUTPATIENT)
Dept: ENDOCRINOLOGY | Facility: CLINIC | Age: 62
End: 2022-07-20
Payer: MEDICARE

## 2022-07-20 LAB — GAD65 AB SER-SCNC: 0 NMOL/L

## 2022-07-20 NOTE — TELEPHONE ENCOUNTER
Reviewed ultrasound. There is no evidence of peripheral arterial disease in the legs. He did have an ultrasound of the veins last fall for the same issue, which did not reveal any abnormality and was treated with Keflex then.    His med list is showing another Keflex round was prescribed in early July and patient states that he took this. Also took Bactrim in the past 1-2 months because of urinary infection. He may need treatment of underlying swelling since issue has persisted through 2 courses of abx. I recommended he FU with Primary Care.     Patient and sister are requesting to establish with a new PCP in [place] or [place].   Please schedule.

## 2022-07-21 DIAGNOSIS — E11.42 DM TYPE 2 WITH DIABETIC PERIPHERAL NEUROPATHY: ICD-10-CM

## 2022-07-21 NOTE — TELEPHONE ENCOUNTER
----- Message from Isaias Lion MA sent at 7/21/2022 10:37 AM CDT -----  Pt is calling for a refill on both of his insulins. # [phone]

## 2022-07-22 RX ORDER — INSULIN GLARGINE 300 U/ML
30 INJECTION, SOLUTION SUBCUTANEOUS DAILY
Qty: 3 PEN | Refills: 2 | Status: SHIPPED | OUTPATIENT
Start: 2022-07-22 | End: 2022-08-17

## 2022-07-22 RX ORDER — INSULIN ASPART 100 [IU]/ML
10 INJECTION, SOLUTION INTRAVENOUS; SUBCUTANEOUS
Qty: 15 ML | Refills: 2 | Status: SHIPPED | OUTPATIENT
Start: 2022-07-22 | End: 2022-08-23

## 2022-07-22 NOTE — TELEPHONE ENCOUNTER
The patient's prescription has been approved and sent to   [facility] #[id number] - [place], [place] - [address] [facility] [address] [facility] [address]  [address] [facility] [address]  Phone: [phone] Fax: [phone]

## 2022-07-28 ENCOUNTER — PATIENT OUTREACH (OUTPATIENT)
Dept: ADMINISTRATIVE | Facility: HOSPITAL | Age: 62
End: 2022-07-28
Payer: MEDICARE

## 2022-07-28 RX ORDER — GABAPENTIN 400 MG/1
400 CAPSULE ORAL 2 TIMES DAILY
Qty: 180 CAPSULE | Refills: 1 | Status: SHIPPED | OUTPATIENT
Start: 2022-07-28 | End: 2023-01-31 | Stop reason: SDUPTHER

## 2022-07-28 NOTE — TELEPHONE ENCOUNTER
The patient's prescription has been approved and sent to   [facility] #[id number] - [place], [place] - [address] [facility] [address] [facility] [address]  [address] [facility] [address]  Phone: [phone] Fax: [phone]

## 2022-07-28 NOTE — TELEPHONE ENCOUNTER
----- Message from Haven Whalen MA sent at 7/28/2022  2:43 PM CDT -----  Regarding: refill  Patient needs refill of gabapentin 400 mg  [facility] [place] [address]  [phone]

## 2022-07-28 NOTE — PROGRESS NOTES
2022 Care Everywhere updates requested and reviewed.  Immunizations reconciled. Media reports reviewed.  Duplicate HM overrides and  orders removed.  Overdue HM topic chart audit and/or requested.  Overdue lab testing linked to upcoming lab appointments if applies.  Lab jorge, and [employer] reviewed   Lab testing       Health Maintenance Due   Topic Date Due    Hepatitis C Screening  Never done    HIV Screening  Never done    Eye Exam  2018    Shingles Vaccine (2 of 2) 2019    COVID-19 Vaccine (4 - Booster for Moderna series) 2022    Pneumococcal Vaccines (Age 0-64) (2 - PCV) 2022    Lipid Panel  06/15/2022    Diabetes Urine Screening  06/15/2022

## 2022-07-28 NOTE — LETTER
July 28, 2022    Matthieu Jovel  [address] [place] [address]             [facility]  [address]  [facility] [address]  Phone: [phone] Dear Paul, [facility] is committed to your overall health.  To help you get the most out of each of your visits, we will review your information to make sure you are up to date on all of your recommended tests and/or procedures.      As a new patient to [facility], we may not have your complete medical records.  After reviewing your chart have found that you may be due for the following:    Health Maintenance Due   Topic    Hepatitis C Screening     HIV Screening     Eye Exam     Shingles Vaccine     COVID-19 Vaccine (4 - Booster for Moderna series)    Pneumococcal Vaccines    Lipid Panel     Diabetes Urine Screening        If you have had any of the above done at another facility, please bring the records or information with you so that your record at [facility] will be complete.      If you are currently taking medication, please bring it with you to your appointment for review.    Thank you for letting us care for you,      Your [facility] Team

## 2022-08-10 ENCOUNTER — TELEPHONE (OUTPATIENT)
Dept: FAMILY MEDICINE | Facility: CLINIC | Age: 62
End: 2022-08-10

## 2022-08-10 NOTE — TELEPHONE ENCOUNTER
----- Message from Veena Gong sent at 8/10/2022  9:57 AM CDT -----  Patient need a refill of his insulin called into [facility] in [place] on [address] if any questions please call [phone]

## 2022-08-11 DIAGNOSIS — E11.42 DM TYPE 2 WITH DIABETIC PERIPHERAL NEUROPATHY: ICD-10-CM

## 2022-08-11 RX ORDER — PEN NEEDLE, DIABETIC 31 GX5/16"
NEEDLE, DISPOSABLE MISCELLANEOUS
Qty: 100 EACH | Refills: 1 | Status: SHIPPED | OUTPATIENT
Start: 2022-08-11 | End: 2022-10-05 | Stop reason: SDUPTHER

## 2022-08-11 NOTE — TELEPHONE ENCOUNTER
The patient's prescription has been approved and sent to   [facility] #[id number] - [place], [place] - [address] [facility] [address] [facility] [address]  [address] [facility] [address]  Phone: [phone] Fax: [phone]

## 2022-08-11 NOTE — TELEPHONE ENCOUNTER
----- Message from Veena Gong sent at 8/11/2022  9:43 AM CDT -----  Patient need a refill of his needles called into [facility] in [place] if any questions please call [phone]

## 2022-08-15 DIAGNOSIS — E11.42 DM TYPE 2 WITH DIABETIC PERIPHERAL NEUROPATHY: Primary | ICD-10-CM

## 2022-08-15 NOTE — TELEPHONE ENCOUNTER
----- Message from Haven Whalen MA sent at 8/15/2022  8:41 AM CDT -----  Regarding: refills  Patient needs refills on insulin but ins. Not paying for it. It needs prior auth.   Kelly porter and liseth Shrestha  [phone]     3

## 2022-08-16 ENCOUNTER — TELEPHONE (OUTPATIENT)
Dept: ENDOCRINOLOGY | Facility: CLINIC | Age: 62
End: 2022-08-16
Payer: MEDICARE

## 2022-08-16 NOTE — TELEPHONE ENCOUNTER
----- Message from Iris Avalos sent at 8/16/2022  1:08 PM CDT -----  Contact: self  Type: Sooner Appointment Request        Caller is requesting a sooner appointment. Caller declined first available appointment listed below. Caller will not accept being placed on the waitlist and is requesting a message be sent to doctor.        Name of Caller: Patient sister (brooks solitario)  When is the first available appointment? 9/29/22  Best Call Back Number: [phone]  Additional Information: Pt is in the hospital today wont be able to make appt that was offered for this week will be out by by Friday. Has to see another doctor Thursday so those times didn't work. The pt really needs to be seen before 9/29. Plz call out to sister to get him scheduled. Thanks.

## 2022-08-17 RX ORDER — INSULIN GLARGINE 100 [IU]/ML
30 INJECTION, SOLUTION SUBCUTANEOUS DAILY
Qty: 3 EACH | Refills: 3 | Status: SHIPPED | OUTPATIENT
Start: 2022-08-17 | End: 2022-08-23

## 2022-08-18 NOTE — TELEPHONE ENCOUNTER
The patient's prescription has been approved and sent to   [facility] #[id number] - [place], [place] - [address] [facility] AT [facility] [address]  [address] [facility] [address]  Phone: [phone] Fax: [phone]

## 2022-08-23 ENCOUNTER — TELEPHONE (OUTPATIENT)
Dept: ENDOCRINOLOGY | Facility: CLINIC | Age: 62
End: 2022-08-23
Payer: MEDICARE

## 2022-08-23 ENCOUNTER — OFFICE VISIT (OUTPATIENT)
Dept: ENDOCRINOLOGY | Facility: CLINIC | Age: 62
End: 2022-08-23
Payer: MEDICARE

## 2022-08-23 VITALS
HEART RATE: 83 BPM | SYSTOLIC BLOOD PRESSURE: 122 MMHG | WEIGHT: 169 LBS | DIASTOLIC BLOOD PRESSURE: 70 MMHG | HEIGHT: 64 IN | BODY MASS INDEX: 28.85 KG/M2

## 2022-08-23 DIAGNOSIS — E11.42 DM TYPE 2 WITH DIABETIC PERIPHERAL NEUROPATHY: ICD-10-CM

## 2022-08-23 DIAGNOSIS — F89 DEVELOPMENTAL DISORDER: ICD-10-CM

## 2022-08-23 DIAGNOSIS — E11.40 TYPE 2 DIABETES MELLITUS WITH DIABETIC NEUROPATHY, WITH LONG-TERM CURRENT USE OF INSULIN: Primary | ICD-10-CM

## 2022-08-23 DIAGNOSIS — E11.649 HYPOGLYCEMIA ASSOCIATED WITH TYPE 2 DIABETES MELLITUS: ICD-10-CM

## 2022-08-23 DIAGNOSIS — Z79.4 TYPE 2 DIABETES MELLITUS WITH DIABETIC NEUROPATHY, WITH LONG-TERM CURRENT USE OF INSULIN: Primary | ICD-10-CM

## 2022-08-23 PROCEDURE — 99214 PR OFFICE/OUTPT VISIT, EST, LEVL IV, 30-39 MIN: ICD-10-PCS | Mod: S$GLB,,, | Performed by: NURSE PRACTITIONER

## 2022-08-23 PROCEDURE — 3074F PR MOST RECENT SYSTOLIC BLOOD PRESSURE < 130 MM HG: ICD-10-PCS | Mod: CPTII,S$GLB,, | Performed by: NURSE PRACTITIONER

## 2022-08-23 PROCEDURE — 3078F PR MOST RECENT DIASTOLIC BLOOD PRESSURE < 80 MM HG: ICD-10-PCS | Mod: CPTII,S$GLB,, | Performed by: NURSE PRACTITIONER

## 2022-08-23 PROCEDURE — 3074F SYST BP LT 130 MM HG: CPT | Mod: CPTII,S$GLB,, | Performed by: NURSE PRACTITIONER

## 2022-08-23 PROCEDURE — 3008F PR BODY MASS INDEX (BMI) DOCUMENTED: ICD-10-PCS | Mod: CPTII,S$GLB,, | Performed by: NURSE PRACTITIONER

## 2022-08-23 PROCEDURE — 3046F HEMOGLOBIN A1C LEVEL >9.0%: CPT | Mod: CPTII,S$GLB,, | Performed by: NURSE PRACTITIONER

## 2022-08-23 PROCEDURE — 99999 PR PBB SHADOW E&M-EST. PATIENT-LVL V: CPT | Mod: PBBFAC,,, | Performed by: NURSE PRACTITIONER

## 2022-08-23 PROCEDURE — 99999 PR PBB SHADOW E&M-EST. PATIENT-LVL V: ICD-10-PCS | Mod: PBBFAC,,, | Performed by: NURSE PRACTITIONER

## 2022-08-23 PROCEDURE — 1160F RVW MEDS BY RX/DR IN RCRD: CPT | Mod: CPTII,S$GLB,, | Performed by: NURSE PRACTITIONER

## 2022-08-23 PROCEDURE — 3008F BODY MASS INDEX DOCD: CPT | Mod: CPTII,S$GLB,, | Performed by: NURSE PRACTITIONER

## 2022-08-23 PROCEDURE — 1160F PR REVIEW ALL MEDS BY PRESCRIBER/CLIN PHARMACIST DOCUMENTED: ICD-10-PCS | Mod: CPTII,S$GLB,, | Performed by: NURSE PRACTITIONER

## 2022-08-23 PROCEDURE — 1159F MED LIST DOCD IN RCRD: CPT | Mod: CPTII,S$GLB,, | Performed by: NURSE PRACTITIONER

## 2022-08-23 PROCEDURE — 1159F PR MEDICATION LIST DOCUMENTED IN MEDICAL RECORD: ICD-10-PCS | Mod: CPTII,S$GLB,, | Performed by: NURSE PRACTITIONER

## 2022-08-23 PROCEDURE — 3078F DIAST BP <80 MM HG: CPT | Mod: CPTII,S$GLB,, | Performed by: NURSE PRACTITIONER

## 2022-08-23 PROCEDURE — 99214 OFFICE O/P EST MOD 30 MIN: CPT | Mod: S$GLB,,, | Performed by: NURSE PRACTITIONER

## 2022-08-23 PROCEDURE — 3046F PR MOST RECENT HEMOGLOBIN A1C LEVEL > 9.0%: ICD-10-PCS | Mod: CPTII,S$GLB,, | Performed by: NURSE PRACTITIONER

## 2022-08-23 RX ORDER — LEVOFLOXACIN 750 MG/1
750 TABLET ORAL DAILY
COMMUNITY
Start: 2022-08-21 | End: 2022-09-14

## 2022-08-23 RX ORDER — INSULIN ASPART 100 [IU]/ML
8 INJECTION, SOLUTION INTRAVENOUS; SUBCUTANEOUS
Qty: 15 ML | Refills: 2
Start: 2022-08-23 | End: 2023-03-28 | Stop reason: SDUPTHER

## 2022-08-23 RX ORDER — FLUOXETINE HYDROCHLORIDE 40 MG/1
40 CAPSULE ORAL DAILY
COMMUNITY
Start: 2022-08-16 | End: 2022-09-14 | Stop reason: DRUGHIGH

## 2022-08-23 RX ORDER — INSULIN GLARGINE 100 [IU]/ML
26 INJECTION, SOLUTION SUBCUTANEOUS DAILY
Qty: 3 EACH | Refills: 3
Start: 2022-08-23 | End: 2023-03-28 | Stop reason: SDUPTHER

## 2022-08-23 RX ORDER — BETHANECHOL CHLORIDE 25 MG/1
TABLET ORAL
Status: ON HOLD | COMMUNITY
Start: 2022-08-22 | End: 2023-06-14 | Stop reason: HOSPADM

## 2022-08-23 NOTE — TELEPHONE ENCOUNTER
Spoke with wife and reiterated insulin dosing as documented from visit today with FABY Ruiz:     -- decrease Toujeo to 26 units QHS  -- decrease Novolog to 8 units with meals + correction scale, target 150, ISF 25.

## 2022-08-23 NOTE — TELEPHONE ENCOUNTER
----- Message from Margi Vieyra sent at 8/23/2022  2:53 PM CDT -----  Contact: self  Patients wife has a quick question, pt was in the ofc this morning, call back at [phone] and thanks

## 2022-08-23 NOTE — PROGRESS NOTES
CC: Mr. Matthieu Jovel arrives today for management of Type 2 DM and review of chronic medical conditions, as listed in the Visit Diagnosis section of this encounter.       HPI: . Matthieu Jovel was diagnosed with Type 2 DM at age 36. He was diagnosed based on lab work. Initial treatment consisted of orals. Insulin was added later. + FH of DM in mother. He does report hospitalizations due to DM.     He is accompanied by his sister. Patient has some developmental delay. He lives in a group home.     Patient presents for 6 week follow up after adjusting insulin regimen.  Freestyle Edilberto 2 was also ordered. C-peptide recently resulted low but random glucose was 69 mg/dL at the time. KARL and anti-islet cell ab were negative.    Recently hospitalized at [facility] for urinary tract infection.     BG readings are checked 3x/day. He brings Freestyle Edilberto 2 and are asking me to place this on him today.          Hypoglycemia: sporadic  Hypoglycemic Symptoms: jitteriness and sweating  Hypoglycemia Treatment: juice      Missing Insulin/PO medication doses: Denies.   Timing prandial insulin 5-15 minutes before meals: yes    Exercise: No    Dietary Habits: Eats 3 meals/day. Snacks on chips. Drinks mostly Diet Coke.     Last DM education appointment:        CURRENT DIABETIC MEDS: metformin XR 1000 mg BID, Basaglar 30 units QHS, Novolog 10 units with meals + correction scale, target 150, ISF 25  Vial or pen: pen  Glucometer type: One Touch Verio    Previous DM treatments:    Last Eye Exam: 2021, no DRKirt Unsure of provider  Last Podiatry Exam: 2022, Unsure of provider    REVIEW OF SYSTEMS  Constitutional: no c/o fatigue, weakness, weight loss  Cardiac: no palpitations or chest pain.  Respiratory: no cough or dyspnea.  GI: no c/o abdominal pain or nausea. Denies h/o pancreatitis. Reports constipation. Recently started metamucil.   : reports urinary retention. Self-caths occasionally.   Skin: no c/o wounds, rashes  Neuro: +  "numbness, tingling, parasthesias in feet   Endocrine: denies polyphagia, polydipsia, polyuria      Personally reviewed Past Medical, Surgical, Social History.    Vital Signs  /70   Pulse 83   Ht 5' 4" (1.626 m)   Wt 76.6 kg (168 lb 15.7 oz)   BMI 29.01 kg/m²     Personally reviewed the below labs:    Hemoglobin A1C   Date Value Ref Range Status   06/22/2022 10.7 % Final   03/28/2022 9.6 % Final   11/22/2021 9.4 % Final   06/15/2021 7.6 (H) <5.7 % of total Hgb Final     Comment:     For someone without known diabetes, a hemoglobin A1c  value of 6.5% or greater indicates that they may have   diabetes and this should be confirmed with a follow-up   test.     For someone with known diabetes, a value <7% indicates   that their diabetes is well controlled and a value   greater than or equal to 7% indicates suboptimal   control. A1c targets should be individualized based on   duration of diabetes, age, comorbid conditions, and   other considerations.     Currently, no consensus exists regarding use of  hemoglobin A1c for diagnosis of diabetes for children.         05/18/2020 9.4 (H) <5.7 % of total Hgb Final     Comment:     For someone without known diabetes, a hemoglobin A1c  value of 6.5% or greater indicates that they may have   diabetes and this should be confirmed with a follow-up   test.     For someone with known diabetes, a value <7% indicates   that their diabetes is well controlled and a value   greater than or equal to 7% indicates suboptimal   control. A1c targets should be individualized based on   duration of diabetes, age, comorbid conditions, and   other considerations.     Currently, no consensus exists regarding use of  hemoglobin A1c for diagnosis of diabetes for children.         09/24/2018 8.8 % Final       Chemistry        Component Value Date/Time     05/18/2022 0804     10/22/2018 1106    K 5.1 05/18/2022 0804    CL 96 05/18/2022 0804     10/22/2018 1106    CO2 16 (L) " 05/18/2022 0804    BUN 23 05/18/2022 0804    CREATININE 1.23 05/18/2022 0804    CREATININE 1.13 10/22/2018 1106    GLU 69 (L) 07/13/2022 1455     (H) 10/22/2018 1106        Component Value Date/Time    CALCIUM 9.3 05/18/2022 0804    ALKPHOS 125 11/13/2020 1300    AST 17 05/18/2022 0804    ALT 12 05/18/2022 0804    BILITOT 0.3 05/18/2022 0804    ESTGFRAFRICA 87 06/15/2021 1134    EGFRNONAA 56 (L) 11/16/2021 1043          Lab Results   Component Value Date    CHOL 137 06/15/2021    CHOL 147 05/18/2020     Lab Results   Component Value Date    HDL 66 06/15/2021    HDL 59 05/18/2020     Lab Results   Component Value Date    LDLCALC 55 06/15/2021    LDLCALC 67 05/18/2020     Lab Results   Component Value Date    TRIG 77 06/15/2021    TRIG 126 05/18/2020     Lab Results   Component Value Date    CHOLHDL 2.1 06/15/2021    CHOLHDL 2.5 05/18/2020       Lab Results   Component Value Date    MICALBCREAT 9 06/15/2021     No results found for: TSH    CrCl cannot be calculated (Patient's most recent lab result is older than the maximum 7 days allowed.).    No results found for: [id number]      Latest Reference Range & Units 07/13/22 14:55   Glucose 70 - 110 mg/dL 69 (L)   (L): Data is abnormally low   Latest Reference Range & Units 07/13/22 14:55   C-Peptide 0.78 - 5.19 ng/mL 0.40 (L)   Glutamic Acid Decarb Ab <=0.02 nmol/L 0.00   ISLET CELL AB <1:4  <1:4   (L): Data is abnormally low      PHYSICAL EXAMINATION  Deferred        Goals      HEMOGLOBIN A1C < 8             Assessment/Plan  1. Type 2 diabetes mellitus with diabetic neuropathy, with long-term current use of insulin  -- Appears insulinopenic but aware glucose was low at time of recent C-peptide. Glucoses are highly variable, which is more suggestive of Type 1 DM. Will repeat C-peptide with next lab draw.    -- decrease Toujeo to 26 units QHS  -- decrease Novolog to 8 units with meals + correction scale, target 150, ISF 25.   -- applied Freestyle Edilberto 2 in  clinic today. Sister observed and plans to place on pt every 14 days.     -- Reviewed hypoglycemia management: treat with 4 oz of juice, 4 oz regular soda, or 4 glucose tablets. Monitor and repeat treatment every 15 minutes until BG is >70 Then have a snack, which includes 15 grams of complex carbohydrates and protein.    2. Developmental disorder  -- complicating understanding of insulin dosing   3. Hypoglycemia associated with type 2 diabetes mellitus -- advised pt to notify me if hypoglycemia continues.   -- I recommended glucagon but patient declined. Keeps quick acting CHO handy          FOLLOW UP  Follow up in about 8 weeks (around 10/18/2022).   Patient instructed to bring BG logs to each follow up   Patient encouraged to call for any BG/medication issues, concerns, or questions.    Orders Placed This Encounter   Procedures    Hemoglobin A1C    Lipid Panel    Comprehensive Metabolic Panel    TSH    Microalbumin/Creatinine Ratio, Urine    C-Peptide

## 2022-09-14 ENCOUNTER — OFFICE VISIT (OUTPATIENT)
Dept: FAMILY MEDICINE | Facility: CLINIC | Age: 62
End: 2022-09-14
Payer: MEDICARE

## 2022-09-14 VITALS
DIASTOLIC BLOOD PRESSURE: 86 MMHG | BODY MASS INDEX: 29.37 KG/M2 | SYSTOLIC BLOOD PRESSURE: 128 MMHG | HEIGHT: 64 IN | HEART RATE: 80 BPM | WEIGHT: 172 LBS

## 2022-09-14 DIAGNOSIS — E11.42 DM TYPE 2 WITH DIABETIC PERIPHERAL NEUROPATHY: Primary | ICD-10-CM

## 2022-09-14 DIAGNOSIS — S31.829S WOUND OF LEFT BUTTOCK, SEQUELA: ICD-10-CM

## 2022-09-14 DIAGNOSIS — F20.9 SCHIZOPHRENIA, UNSPECIFIED TYPE: ICD-10-CM

## 2022-09-14 DIAGNOSIS — Z76.0 MEDICATION REFILL: ICD-10-CM

## 2022-09-14 DIAGNOSIS — K21.9 GASTROESOPHAGEAL REFLUX DISEASE WITHOUT ESOPHAGITIS: ICD-10-CM

## 2022-09-14 LAB — HBA1C MFR BLD: 6.6 %

## 2022-09-14 PROCEDURE — 83036 HEMOGLOBIN GLYCOSYLATED A1C: CPT | Mod: QW,,, | Performed by: FAMILY MEDICINE

## 2022-09-14 PROCEDURE — 99214 OFFICE O/P EST MOD 30 MIN: CPT | Mod: S$GLB,,, | Performed by: FAMILY MEDICINE

## 2022-09-14 PROCEDURE — 83036 POCT HEMOGLOBIN A1C: ICD-10-PCS | Mod: QW,,, | Performed by: FAMILY MEDICINE

## 2022-09-14 PROCEDURE — 1159F MED LIST DOCD IN RCRD: CPT | Mod: CPTII,S$GLB,, | Performed by: FAMILY MEDICINE

## 2022-09-14 PROCEDURE — 3079F DIAST BP 80-89 MM HG: CPT | Mod: CPTII,S$GLB,, | Performed by: FAMILY MEDICINE

## 2022-09-14 PROCEDURE — 3074F SYST BP LT 130 MM HG: CPT | Mod: CPTII,S$GLB,, | Performed by: FAMILY MEDICINE

## 2022-09-14 PROCEDURE — 99214 PR OFFICE/OUTPT VISIT, EST, LEVL IV, 30-39 MIN: ICD-10-PCS | Mod: S$GLB,,, | Performed by: FAMILY MEDICINE

## 2022-09-14 PROCEDURE — 1160F RVW MEDS BY RX/DR IN RCRD: CPT | Mod: CPTII,S$GLB,, | Performed by: FAMILY MEDICINE

## 2022-09-14 PROCEDURE — 3074F PR MOST RECENT SYSTOLIC BLOOD PRESSURE < 130 MM HG: ICD-10-PCS | Mod: CPTII,S$GLB,, | Performed by: FAMILY MEDICINE

## 2022-09-14 PROCEDURE — 1159F PR MEDICATION LIST DOCUMENTED IN MEDICAL RECORD: ICD-10-PCS | Mod: CPTII,S$GLB,, | Performed by: FAMILY MEDICINE

## 2022-09-14 PROCEDURE — 1160F PR REVIEW ALL MEDS BY PRESCRIBER/CLIN PHARMACIST DOCUMENTED: ICD-10-PCS | Mod: CPTII,S$GLB,, | Performed by: FAMILY MEDICINE

## 2022-09-14 PROCEDURE — 3008F PR BODY MASS INDEX (BMI) DOCUMENTED: ICD-10-PCS | Mod: CPTII,S$GLB,, | Performed by: FAMILY MEDICINE

## 2022-09-14 PROCEDURE — 3008F BODY MASS INDEX DOCD: CPT | Mod: CPTII,S$GLB,, | Performed by: FAMILY MEDICINE

## 2022-09-14 PROCEDURE — 3079F PR MOST RECENT DIASTOLIC BLOOD PRESSURE 80-89 MM HG: ICD-10-PCS | Mod: CPTII,S$GLB,, | Performed by: FAMILY MEDICINE

## 2022-09-14 PROCEDURE — 3044F HG A1C LEVEL LT 7.0%: CPT | Mod: CPTII,S$GLB,, | Performed by: FAMILY MEDICINE

## 2022-09-14 PROCEDURE — 3044F PR MOST RECENT HEMOGLOBIN A1C LEVEL <7.0%: ICD-10-PCS | Mod: CPTII,S$GLB,, | Performed by: FAMILY MEDICINE

## 2022-09-14 RX ORDER — METFORMIN HYDROCHLORIDE 500 MG/1
1000 TABLET, EXTENDED RELEASE ORAL 2 TIMES DAILY WITH MEALS
Qty: 360 TABLET | Refills: 1 | Status: SHIPPED | OUTPATIENT
Start: 2022-09-14 | End: 2023-05-10

## 2022-09-14 RX ORDER — LINACLOTIDE 290 UG/1
290 CAPSULE, GELATIN COATED ORAL DAILY
Qty: 90 CAPSULE | Refills: 1 | Status: SHIPPED | OUTPATIENT
Start: 2022-09-14 | End: 2023-05-10

## 2022-09-14 RX ORDER — PANTOPRAZOLE SODIUM 20 MG/1
20 TABLET, DELAYED RELEASE ORAL DAILY
Qty: 90 TABLET | Refills: 1 | Status: SHIPPED | OUTPATIENT
Start: 2022-09-14 | End: 2023-01-31 | Stop reason: SDUPTHER

## 2022-09-14 RX ORDER — FUROSEMIDE 40 MG/1
40 TABLET ORAL DAILY
COMMUNITY
Start: 2022-09-06 | End: 2023-05-10

## 2022-09-14 RX ORDER — POTASSIUM CHLORIDE 20 MEQ/1
20 TABLET, EXTENDED RELEASE ORAL DAILY
Qty: 90 TABLET | Refills: 1 | Status: SHIPPED | OUTPATIENT
Start: 2022-09-14 | End: 2023-05-10

## 2022-09-14 RX ORDER — PRAVASTATIN SODIUM 40 MG/1
40 TABLET ORAL DAILY
Qty: 90 TABLET | Refills: 1 | Status: SHIPPED | OUTPATIENT
Start: 2022-09-14 | End: 2023-05-10

## 2022-09-14 RX ORDER — BENZTROPINE MESYLATE 1 MG/1
1 TABLET ORAL 2 TIMES DAILY
Qty: 180 TABLET | Refills: 2 | Status: SHIPPED | OUTPATIENT
Start: 2022-09-14 | End: 2023-05-10

## 2022-09-14 NOTE — PROGRESS NOTES
SUBJECTIVE:    Patient ID: Matthieu Jovel is a 62 y.o. male.    Chief Complaint: Hyperlipidemia (Did nor bring bottles//needs refills//needs A1c & foot exam//bs) and Diabetes    Pt here to checkup on acute and chronic conditions.  Pt here with his SW.      A1c 6.6%.  has gained  4 pounds since last visit. Has not been exercising. Pt has been to Endo ([facility]) since last visit. Has been using Freestyle deidra.  Admits to cheating for breakfast this morning. Overall he has been eating better.  Toujeo has been changed to basaglar 30 units at night. And taking novolog sliding scale with a tdd of 60 units. He does have episodes of hypoglycemia.    Pt states he was admitted to the hospital twice since last visit, went to [place], do not have access to records.     Heartburn is doing ok, taking protonix as needed.    Continue to see Dr. Pacheco, still doing in/out cath. Pt was told he was not cleaning his penis well enough before catheterization and had been hospitalized for infection at some point. He also works out of [place].     No reported changes in psyche med. Not agitated today.    Still having episodes of dizziness.  Has fallen since last visit, was wearing shoes that were too big.    Pt has a wound on his rear that he states hurts, but it states has healed. States it has a scab.   ---------------------------------------------------------------------------  Has had cscope 4 times and has had inadequate prep, so they will not do it anymore.   (Dr. Danielle)        Office Visit on 09/14/2022   Component Date Value Ref Range Status    Hemoglobin A1C 09/14/2022 6.6  % Final   Lab Visit on 07/13/2022   Component Date Value Ref Range Status    C-Peptide 07/13/2022 0.40 (L)  0.78 - 5.19 ng/mL Final    Glutamic Acid Decarb Ab 07/13/2022 0.00  <=0.02 nmol/L Final    Islet Cell Ab 07/13/2022 <1:4  <1:4 Final    Glucose 07/13/2022 69 (L)  70 - 110 mg/dL Final   Office Visit on 06/22/2022   Component Date Value Ref  Range Status    Hemoglobin A1C 06/22/2022 10.7  % Final   Admission on 06/07/2022, Discharged on 06/07/2022   Component Date Value Ref Range Status    POC WBC 06/07/2022 10.4  3.9 - 12.7 K/uL Final    POC GRA% 06/07/2022 86.3 (H)  38.0 - 73.0 % Final    POC MID% 06/07/2022 3.7 (L)  4.0 - 15.0 % Final    POC LYM% 06/07/2022 10.0 (L)  18.0 - 48.0 % Final    POC Gran# 06/07/2022 9.0 (H)  1.8 - 7.7 K/uL Final    POC MID# 06/07/2022 0.4  0.3 - 1.0 K/uL Final    POC LYM# 06/07/2022 1.0  1.0 - 4.8 K/uL Final    POC RBC 06/07/2022 4.00 (L)  4.60 - 6.20 M/uL Final    POC HGB 06/07/2022 11.8 (L)  14.0 - 18.0 g/dL Final    POC MCV 06/07/2022 81.7 (L)  82.0 - 98.0 fl Final    POC HCT 06/07/2022 32.7 (L)  40.0 - 54.0 % Final    POC MCH 06/07/2022 29.7  27.0 - 31.0 pg Final    POC MCHC 06/07/2022 36.3 (H)  32.0 - 36.0 g/dL Final    POC RDW% 06/07/2022 11.9  11.5 - 14.5 % Final    POC PLT 06/07/2022 396  150 - 450 K/uL Final    POC MPV 06/07/2022 7.8 (L)  9.2 - 12.9 fl Final    POC Sodium 06/07/2022 133  128 - 145 mmol/L Final    POC Potassium 06/07/2022 4.5  3.6 - 5.1 mmol/L Final    POC Chloride 06/07/2022 95 (L)  98 - 108 mmol/L Final    POC CO2 06/07/2022 26  18 - 33 mmol/L Final    POC Glucose 06/07/2022 327 (H)  73 - 118 mg/dL Final    POC BUN 06/07/2022 20  7 - 22 mg/dL Final    POC Creatinine 06/07/2022 1.2  0.6 - 1.2 mg/dL Final    Calcium, POC 06/07/2022 9.6  8.0 - 10.3 mg/dL Final    Albumin, POC 06/07/2022 3.0 (L)  3.3 - 5.5 g/dL Final    POC ALT 06/07/2022 17  10 - 47 U/L Final    POC AST 06/07/2022 20  11 - 38 U/L Final    Alkaline Phosphatase, POC 06/07/2022 151 (H)  53 - 128 U/L Final    POC TOTAL PROTEIN 06/07/2022 7.6  6.4 - 8.1 g/dL Final    POC TOTAL BILIRUBIN 06/07/2022 0.4  0.2 - 1.6 mg/dL Final    POC eGFR 06/07/2022 >60  61 - 2,000 mL/min Final    POC Hemolysis 06/07/2022 1+   Final    Specimen Type 06/07/2022 BLNK   Final    POCT Glucose 06/07/2022 361 (H)  70 - 110 mg/dL Final    POCT Glucose 06/07/2022  158 (H)  70 - 110 mg/dL Final   Ancillary Orders on 05/13/2022   Component Date Value Ref Range Status    WBC 05/18/2022 5.1  3.4 - 10.8 x10E3/uL Final    RBC 05/18/2022 5.05  4.14 - 5.80 x10E6/uL Final    Hemoglobin 05/18/2022 15.3  13.0 - 17.7 g/dL Final    Hematocrit 05/18/2022 46.0  37.5 - 51.0 % Final    MCV 05/18/2022 91  79 - 97 fL Final    MCH 05/18/2022 30.3  26.6 - 33.0 pg Final    MCHC 05/18/2022 33.3  31.5 - 35.7 g/dL Final    RDW 05/18/2022 13.9  11.6 - 15.4 % Final    Platelets 05/18/2022 139 (L)  150 - 450 x10E3/uL Final    Neutrophils 05/18/2022 77  Not Estab. % Final    Lymphs 05/18/2022 13  Not Estab. % Final    Monocytes 05/18/2022 5  Not Estab. % Final    Eos 05/18/2022 2  Not Estab. % Final    Basos 05/18/2022 2  Not Estab. % Final    Neutrophils (Absolute) 05/18/2022 4.0  1.4 - 7.0 x10E3/uL Final    Lymphs (Absolute) 05/18/2022 0.7  0.7 - 3.1 x10E3/uL Final    Monocytes(Absolute) 05/18/2022 0.2  0.1 - 0.9 x10E3/uL Final    Eos (Absolute) 05/18/2022 0.1  0.0 - 0.4 x10E3/uL Final    Baso (Absolute) 05/18/2022 0.1  0.0 - 0.2 x10E3/uL Final    Immature Granulocytes 05/18/2022 1  Not Estab. % Final    Immature Grans (Abs) 05/18/2022 0.1  0.0 - 0.1 x10E3/uL Final    Glucose 05/18/2022 445 (H)  65 - 99 mg/dL Final    BUN 05/18/2022 23  8 - 27 mg/dL Final    Creatinine 05/18/2022 1.23  0.76 - 1.27 mg/dL Final    eGFR 05/18/2022 67  >59 mL/min/1.73 Final    BUN/Creatinine Ratio 05/18/2022 19  10 - 24 Final    Sodium 05/18/2022 134  134 - 144 mmol/L Final    Potassium 05/18/2022 5.1  3.5 - 5.2 mmol/L Final    Chloride 05/18/2022 96  96 - 106 mmol/L Final    CO2 05/18/2022 16 (L)  20 - 29 mmol/L Final    Calcium 05/18/2022 9.3  8.6 - 10.2 mg/dL Final    Protein, Total 05/18/2022 7.0  6.0 - 8.5 g/dL Final    Albumin 05/18/2022 4.7  3.8 - 4.8 g/dL Final    Globulin, Total 05/18/2022 2.3  1.5 - 4.5 g/dL Final    Albumin/Globulin Ratio 05/18/2022 2.0  1.2 - 2.2 Final    Total Bilirubin 05/18/2022 0.3  0.0 -  "1.2 mg/dL Final    Alkaline Phosphatase 2022 149 (H)  44 - 121 IU/L Final    AST 2022 17  0 - 40 IU/L Final    ALT 2022 12  0 - 44 IU/L Final    TIBC 2022 399  250 - 450 ug/dL Final    UIBC 2022 312  111 - 343 ug/dL Final    Iron 2022 87  38 - 169 ug/dL Final    Iron Saturation 2022 22  15 - 55 % Final    Ferritin 2022 87  30 - 400 ng/mL Final    Vitamin B-12 2022 WILL FOLLOW   Preliminary    Folate 2022 CANCELED  ng/mL Final-Edited   Office Visit on 2022   Component Date Value Ref Range Status    Hemoglobin A1C 2022 9.6  % Final       Past Medical History:   Diagnosis Date    Development disorder, mixed     Diabetes mellitus type II     Mental and behavioral problems with communication (including speech)      Social History     Socioeconomic History    Marital status: Single   Tobacco Use    Smoking status: Former     Types: Cigarettes     Quit date: 1998     Years since quittin.4    Smokeless tobacco: Never   Substance and Sexual Activity    Alcohol use: No    Drug use: No     Past Surgical History:   Procedure Laterality Date    FOOT SURGERY       Family History   Problem Relation Age of Onset    Diabetes Mother     Glaucoma Mother        Review of patient's allergies indicates:   Allergen Reactions    Codeine     Morphine sulfate Other (See Comments)     Other reaction(s): Unknown       Current Outpatient Medications:     amitriptyline (ELAVIL) 25 MG tablet, Take 1 tablet (25 mg total) by mouth every evening., Disp: 30 tablet, Rfl: 1    BD ULTRA-FINE MINI PEN NEEDLE 31 gauge x 3/16" Ndle, INJECT 1 PEN NEEDLE UNDER THE SKIN FOUR TIMES DAILY, Disp: 100 each, Rfl: 1    benztropine (COGENTIN) 1 MG tablet, Take 1 tablet (1 mg total) by mouth 2 (two) times daily., Disp: 180 tablet, Rfl: 2    bethanechol (URECHOLINE) 25 MG Tab, Take by mouth., Disp: , Rfl:     blood sugar diagnostic Strp, To check BG 4 times daily, to use with insurance " preferred meter, Disp: 400 strip, Rfl: 1    cephALEXin (KEFLEX) 500 MG capsule, Take 500 mg by mouth 3 (three) times daily., Disp: , Rfl:     CONTOUR METER Misc, UTD, Disp: , Rfl: 0    ferrous gluconate (FERGON) 324 MG tablet, TAKE 1 TABLET BY MOUTH TWICE DAILY, Disp: 60 tablet, Rfl: 6    fluconazole (DIFLUCAN) 200 MG Tab, Take 200 mg by mouth once daily., Disp: , Rfl:     furosemide (LASIX) 40 MG tablet, Take 40 mg by mouth once daily., Disp: , Rfl:     gabapentin (NEURONTIN) 400 MG capsule, Take 1 capsule (400 mg total) by mouth 2 (two) times a day., Disp: 180 capsule, Rfl: 1    insulin (BASAGLAR KWIKPEN U-100 INSULIN) glargine 100 units/mL SubQ pen, Inject 26 Units into the skin once daily., Disp: 3 each, Rfl: 3    insulin aspart U-100 (NOVOLOG FLEXPEN U-100 INSULIN) 100 unit/mL (3 mL) InPn pen, Inject 8 Units into the skin 3 (three) times daily with meals. Plus correction scale, max tdd 60u, Disp: 15 mL, Rfl: 2    LATUDA 80 mg Tab tablet, TK 1 T PO BID, Disp: , Rfl: 1    LINZESS 290 mcg Cap capsule, Take 1 capsule (290 mcg total) by mouth once daily., Disp: 90 capsule, Rfl: 1    meclizine (ANTIVERT) 25 mg tablet, Take 1 tablet (25 mg total) by mouth daily as needed for Dizziness., Disp: 30 tablet, Rfl: 5    metFORMIN (GLUCOPHAGE-XR) 500 MG ER 24hr tablet, Take 2 tablets (1,000 mg total) by mouth 2 (two) times daily with meals., Disp: 360 tablet, Rfl: 1    naproxen (NAPROSYN) 375 MG tablet, Take 1 tablet (375 mg total) by mouth 2 (two) times daily with meals., Disp: 30 tablet, Rfl: 0    ondansetron (ZOFRAN-ODT) 4 MG TbDL, Take 1 tablet (4 mg total) by mouth once daily., Disp: 30 tablet, Rfl: 2    OXcarbazepine (TRILEPTAL) 300 MG Tab, Take 1 tablet (300 mg total) by mouth once daily., Disp: 90 tablet, Rfl: 1    pantoprazole (PROTONIX) 20 MG tablet, Take 1 tablet (20 mg total) by mouth once daily., Disp: 90 tablet, Rfl: 1    potassium chloride SA (K-DUR,KLOR-CON) 20 MEQ tablet, Take 1 tablet (20 mEq total) by mouth  "once daily., Disp: 90 tablet, Rfl: 1    pravastatin (PRAVACHOL) 40 MG tablet, Take 1 tablet (40 mg total) by mouth once daily., Disp: 90 tablet, Rfl: 1    quetiapine (SEROQUEL) 300 MG Tab, Take 300 mg by mouth once daily., Disp: , Rfl:     TRUEPLUS LANCETS 33 gauge Misc, 1 lancet by In Vitro route 4 (four) times daily., Disp: 200 each, Rfl: 1    Review of Systems   Constitutional:  Negative for appetite change, fatigue, fever and unexpected weight change.   HENT: Negative.     Respiratory:  Negative for cough, chest tightness, shortness of breath and wheezing.    Cardiovascular:  Positive for leg swelling. Negative for chest pain.   Gastrointestinal:  Negative for abdominal pain, constipation, nausea and vomiting.        -heartburn   Genitourinary:  Negative for decreased urine volume, difficulty urinating, dysuria and frequency.   Musculoskeletal:  Negative for arthralgias, back pain, myalgias and neck pain.   Skin:  Negative for rash.   Allergic/Immunologic: Negative.    Neurological:  Positive for dizziness and numbness (feet). Negative for weakness and headaches.   Hematological:  Does not bruise/bleed easily.   Psychiatric/Behavioral:  Positive for agitation. Negative for dysphoric mood, sleep disturbance and suicidal ideas. The patient is not nervous/anxious.    All other systems reviewed and are negative.       Objective:      Vitals:    09/14/22 1140 09/14/22 1201   BP: (!) 144/80 128/86   Pulse: 80    Weight: 78 kg (172 lb)    Height: 5' 4" (1.626 m)      Wt Readings from Last 3 Encounters:   09/14/22 78 kg (172 lb)   08/23/22 76.6 kg (168 lb 15.7 oz)   07/13/22 75.8 kg (167 lb 3.5 oz)       Physical Exam  Vitals reviewed.   Constitutional:       General: He is not in acute distress.     Appearance: Normal appearance. He is well-developed.      Comments: obese   HENT:      Head: Normocephalic and atraumatic.   Neck:      Thyroid: No thyromegaly.   Cardiovascular:      Rate and Rhythm: Normal rate and " regular rhythm.      Pulses:           Dorsalis pedis pulses are 2+ on the right side and 2+ on the left side.        Posterior tibial pulses are 2+ on the right side and 2+ on the left side.      Heart sounds: Normal heart sounds. No murmur heard.    No friction rub.   Pulmonary:      Effort: Pulmonary effort is normal.      Breath sounds: Normal breath sounds. No wheezing or rales.   Abdominal:      General: Bowel sounds are normal. There is no distension.      Palpations: Abdomen is soft.      Tenderness: There is no abdominal tenderness.   Musculoskeletal:      Cervical back: Neck supple.      Right lower le+ Edema (mild) present.      Left lower le+ Edema (mild) present.      Right foot: Normal range of motion. No deformity.      Left foot: Normal range of motion. No deformity.   Feet:      Right foot:      Protective Sensation: 5 sites tested.  3 sites sensed.      Skin integrity: Callus present. No ulcer, blister, skin breakdown, erythema, warmth or dry skin.      Left foot:      Protective Sensation: 5 sites tested.   1 site sensed.     Skin integrity: Callus present. No ulcer, blister, skin breakdown, erythema, warmth or dry skin.   Lymphadenopathy:      Cervical: No cervical adenopathy.   Skin:     General: Skin is warm and dry.      Findings: Lesion present.      Comments: Healed lesion on the left buttock with a scab, no surrounding erythema, induration.   Neurological:      Mental Status: He is alert and oriented to person, place, and time.   Psychiatric:         Attention and Perception: He is attentive.         Mood and Affect: Mood is anxious.         Speech: Speech is rapid and pressured.         Behavior: Behavior normal. Behavior is not agitated.         Thought Content: Thought content normal.         Cognition and Memory: Cognition and memory normal.         Judgment: Judgment is impulsive.         Assessment:       1. DM type 2 with diabetic peripheral neuropathy    2. Schizophrenia,  unspecified type    3. Gastroesophageal reflux disease without esophagitis    4. Wound of left buttock, sequela    5. Medication refill         Plan:       DM type 2 with diabetic peripheral neuropathy  Comments:  Controlled. A1c 6.6%. Encouraged to follow ADA diet, regular blood sugar checks, and encouarged regular exercise. Will continue to monitor A1c.  Orders:  -     POCT HEMOGLOBIN A1C  -     Foot Exam Performed  -     metFORMIN (GLUCOPHAGE-XR) 500 MG ER 24hr tablet; Take 2 tablets (1,000 mg total) by mouth 2 (two) times daily with meals.  Dispense: 360 tablet; Refill: 1    Schizophrenia, unspecified type  Comments:  Stable. Continue to follow with psyche.  Orders:  -     benztropine (COGENTIN) 1 MG tablet; Take 1 tablet (1 mg total) by mouth 2 (two) times daily.  Dispense: 180 tablet; Refill: 2    Gastroesophageal reflux disease without esophagitis  Comments:  Controlled. Will continue to monitor symptoms on protonix  Orders:  -     pantoprazole (PROTONIX) 20 MG tablet; Take 1 tablet (20 mg total) by mouth once daily.  Dispense: 90 tablet; Refill: 1    Wound of left buttock, sequela  Comments:  Healed. W/ scab.  Will refer to wound care.   Orders:  -     Ambulatory referral/consult to Wound Clinic; Future; Expected date: 09/21/2022    Medication refill  -     LINZESS 290 mcg Cap capsule; Take 1 capsule (290 mcg total) by mouth once daily.  Dispense: 90 capsule; Refill: 1  -     potassium chloride SA (K-DUR,KLOR-CON) 20 MEQ tablet; Take 1 tablet (20 mEq total) by mouth once daily.  Dispense: 90 tablet; Refill: 1  -     pravastatin (PRAVACHOL) 40 MG tablet; Take 1 tablet (40 mg total) by mouth once daily.  Dispense: 90 tablet; Refill: 1      Follow up in about 3 months (around 12/14/2022) for HTN, DM, GERD.        9/14/2022 Peter Schwartz

## 2022-10-05 DIAGNOSIS — E11.42 DM TYPE 2 WITH DIABETIC PERIPHERAL NEUROPATHY: ICD-10-CM

## 2022-10-05 RX ORDER — PEN NEEDLE, DIABETIC 31 GX5/16"
NEEDLE, DISPOSABLE MISCELLANEOUS
Qty: 100 EACH | Refills: 1 | Status: SHIPPED | OUTPATIENT
Start: 2022-10-05 | End: 2023-01-06 | Stop reason: SDUPTHER

## 2022-10-05 NOTE — TELEPHONE ENCOUNTER
----- Message from Veena Gong sent at 10/5/2022 10:39 AM CDT -----  Patient need a refill of his BD ULTRA-FINE MINI PEN NEEDLE called into [facility] in [place] if any questions please give him a call at [phone]

## 2022-10-06 NOTE — TELEPHONE ENCOUNTER
The patient's prescription has been approved and sent to   [id number] - [place], [place] - [address] [facility] [address]  [address] [address]  Phone: [phone] Fax: [phone]

## 2022-12-08 ENCOUNTER — TELEPHONE (OUTPATIENT)
Dept: HEMATOLOGY/ONCOLOGY | Facility: CLINIC | Age: 62
End: 2022-12-08

## 2022-12-08 NOTE — TELEPHONE ENCOUNTER
Spoke to pt sister Ita regarding laboratory work needed for f/u appt. On 12/13/22  She asked if I notified the group home, but we dont have the number. she will call his group home to let them know he needs labs.

## 2022-12-12 NOTE — PROGRESS NOTES
"   [facility] Hematology/Oncology  PROGRESS NOTE - Follow-up Visit    Subjective:       Patient ID:   NAME: Matthieu Jovel : 1960     62 y.o. male    Referring Doc: Efren  Other Physicians:    Chief Complaint:  Iron defic anemia f/u    History of Present Illness:     Patient is being seen today in person in clinic for a regular scheduled follow-up visit      The patient is here with his  Sahra. He is resident of [facility].     He denies any new issues; no SOB, HA's or N/V. He had recent cold-like symptoms    He has been eating ok.      He saw Dr Schwartz in 2022    Discussed Covid19 precautions - he had his vaccinations        ROS:   GEN: normal without any fever, night sweats or weight loss  HEENT: normal with no HA's, sore throat, stiff neck, changes in vision  CV: normal with no CP, SOB, PND, PRADO or orthopnea  PULM: normal with no SOB, cough, hemoptysis, sputum or pleuritic pain  GI: normal with no abdominal pain, nausea, vomiting, constipation, diarrhea, melanotic stools, BRBPR, or hematemesis  : normal with no hematuria, dysuria  BREAST: normal with no mass, discharge, pain  SKIN: normal with no rash, erythema, bruising, or swelling    Allergies:    Review of patient's allergies indicates:   Allergen Reactions    Codeine        Medications:    Current Outpatient Medications:     amitriptyline (ELAVIL) 25 MG tablet, Take 1 tablet (25 mg total) by mouth every evening., Disp: 30 tablet, Rfl: 1    BD ULTRA-FINE MINI PEN NEEDLE 31 gauge x 3/16" Ndle, INJECT 1 PEN NEEDLE UNDER THE SKIN FOUR TIMES DAILY, Disp: 100 each, Rfl: 1    benztropine (COGENTIN) 1 MG tablet, Take 1 tablet (1 mg total) by mouth 2 (two) times daily., Disp: 180 tablet, Rfl: 2    bethanechol (URECHOLINE) 25 MG Tab, Take by mouth., Disp: , Rfl:     blood sugar diagnostic Strp, To check BG 4 times daily, to use with insurance preferred meter, Disp: 400 strip, Rfl: 1    cephALEXin (KEFLEX) 500 MG capsule, Take 500 mg by " mouth 3 (three) times daily., Disp: , Rfl:     CONTOUR METER Misc, UTD, Disp: , Rfl: 0    ferrous gluconate (FERGON) 324 MG tablet, TAKE 1 TABLET BY MOUTH TWICE DAILY, Disp: 60 tablet, Rfl: 6    fluconazole (DIFLUCAN) 200 MG Tab, Take 200 mg by mouth once daily., Disp: , Rfl:     furosemide (LASIX) 40 MG tablet, Take 40 mg by mouth once daily., Disp: , Rfl:     gabapentin (NEURONTIN) 400 MG capsule, Take 1 capsule (400 mg total) by mouth 2 (two) times a day., Disp: 180 capsule, Rfl: 1    insulin (BASAGLAR KWIKPEN U-100 INSULIN) glargine 100 units/mL SubQ pen, Inject 26 Units into the skin once daily., Disp: 3 each, Rfl: 3    insulin aspart U-100 (NOVOLOG FLEXPEN U-100 INSULIN) 100 unit/mL (3 mL) InPn pen, Inject 8 Units into the skin 3 (three) times daily with meals. Plus correction scale, max tdd 60u, Disp: 15 mL, Rfl: 2    LATUDA 80 mg Tab tablet, TK 1 T PO BID, Disp: , Rfl: 1    LINZESS 290 mcg Cap capsule, Take 1 capsule (290 mcg total) by mouth once daily., Disp: 90 capsule, Rfl: 1    meclizine (ANTIVERT) 25 mg tablet, Take 1 tablet (25 mg total) by mouth daily as needed for Dizziness., Disp: 30 tablet, Rfl: 5    metFORMIN (GLUCOPHAGE-XR) 500 MG ER 24hr tablet, Take 2 tablets (1,000 mg total) by mouth 2 (two) times daily with meals., Disp: 360 tablet, Rfl: 1    naproxen (NAPROSYN) 375 MG tablet, Take 1 tablet (375 mg total) by mouth 2 (two) times daily with meals., Disp: 30 tablet, Rfl: 0    ondansetron (ZOFRAN-ODT) 4 MG TbDL, Take 1 tablet (4 mg total) by mouth once daily., Disp: 30 tablet, Rfl: 2    OXcarbazepine (TRILEPTAL) 300 MG Tab, Take 1 tablet (300 mg total) by mouth once daily., Disp: 90 tablet, Rfl: 1    pantoprazole (PROTONIX) 20 MG tablet, Take 1 tablet (20 mg total) by mouth once daily., Disp: 90 tablet, Rfl: 1    potassium chloride SA (K-DUR,KLOR-CON) 20 MEQ tablet, Take 1 tablet (20 mEq total) by mouth once daily., Disp: 90 tablet, Rfl: 1    pravastatin (PRAVACHOL) 40 MG tablet, Take 1 tablet  "(40 mg total) by mouth once daily., Disp: 90 tablet, Rfl: 1    quetiapine (SEROQUEL) 300 MG Tab, Take 300 mg by mouth once daily., Disp: , Rfl:     TRUEPLUS LANCETS 33 gauge Misc, 1 lancet by In Vitro route 4 (four) times daily., Disp: 200 each, Rfl: 1    PMHx/PSHx Updates:  See patient's last visit with me on 6/21/2022  See H&P on 8/26/2013        Pathology:  none          Objective:     Vitals:  Blood pressure (!) 142/65, pulse 70, temperature 97.9 °F (36.6 °C), resp. rate 18, height 5' 4" (1.626 m), weight 77.5 kg (170 lb 12.8 oz).        Physical Examination:   GEN: no apparent distress, comfortable; AAOx3  HEAD: atraumatic and normocephalic  EYES: no conjunctival pallor or muddiness, no icterus; normal pupil reaction to ambient light  ENT: OMM, no pharyngeal erythema, external bilateral ears WNL; no visible thrush or ulcers  NECK: no masses or swelling, trachea midline, no visible LAD/LN's   CV: no palpitations; no pedal edema; no noticeable JVD or neck vein distension  CHEST: Normal respiratory effort; chest wall breath movements symmetrical; no audible wheezing  ABDOM: non-distended; no bloating  MUSC/Skeletal: ROM normal; joints visibly normal; no deformities or arthropathy  EXTREM: no clubbing, cyanosis, inflammation or swelling  SKIN: no rashes, lesions, ulcers, petechiae or subcutaneous nodules  : no patel  NEURO: moving all 4 extremities; AAOx3; no tremors  PSYCH: normal mood, affect and behavior  LYMPH: no visible LN's or LAD            Labs:        Lab Results   Component Value Date    WBC 5.1 05/18/2022    HGB 15.3 05/18/2022    HCT 46.0 05/18/2022    MCV 91 05/18/2022     (L) 05/18/2022             Lab Results   Component Value Date    IRON 87 05/18/2022    TIBC 399 05/18/2022    FERRITIN 87 05/18/2022       CMP  Sodium   Date Value Ref Range Status   05/18/2022 134 134 - 144 mmol/L Final   10/22/2018 137 134 - 144 mmol/L      Potassium   Date Value Ref Range Status   05/18/2022 5.1 3.5 - " 5.2 mmol/L Final     Chloride   Date Value Ref Range Status   05/18/2022 96 96 - 106 mmol/L Final   10/22/2018 105 98 - 110 mmol/L      CO2   Date Value Ref Range Status   05/18/2022 16 (L) 20 - 29 mmol/L Final     Glucose   Date Value Ref Range Status   07/13/2022 69 (L) 70 - 110 mg/dL Final   10/22/2018 356 (H) 70 - 99 mg/dL      BUN   Date Value Ref Range Status   05/18/2022 23 8 - 27 mg/dL Final     Creatinine   Date Value Ref Range Status   05/18/2022 1.23 0.76 - 1.27 mg/dL Final   10/22/2018 1.13 0.60 - 1.40 mg/dL      Calcium   Date Value Ref Range Status   05/18/2022 9.3 8.6 - 10.2 mg/dL Final     Total Protein   Date Value Ref Range Status   06/15/2021 6.4 6.1 - 8.1 g/dL Final     Albumin   Date Value Ref Range Status   05/18/2022 4.7 3.8 - 4.8 g/dL Final   06/15/2021 4.1 3.6 - 5.1 g/dL Final   10/22/2018 3.7 3.1 - 4.7 g/dL      Total Bilirubin   Date Value Ref Range Status   05/18/2022 0.3 0.0 - 1.2 mg/dL Final     Alkaline Phosphatase   Date Value Ref Range Status   11/13/2020 125 38 - 145 U/L Final     AST   Date Value Ref Range Status   05/18/2022 17 0 - 40 IU/L Final     ALT   Date Value Ref Range Status   05/18/2022 12 0 - 44 IU/L Final     Anion Gap   Date Value Ref Range Status   11/13/2020 5 (L) 8 - 16 mmol/L Final     eGFR if    Date Value Ref Range Status   06/15/2021 87 > OR = 60 mL/min/1.73m2 Final     eGFR if non    Date Value Ref Range Status   11/16/2021 56 (L) >59 mL/min/1.73 Final                 Radiology/Diagnostic Studies:    No results found.    I have reviewed all available lab results and radiology reports.    Assessment/Plan:   (1) 62 y.o. male with diagnosis of psychiatric and mental issues who is a resident of [facility]    (2) Mild chronic pancytopenia suspected secondary to antipsychotic meds  - he had some recent labs in June 2020 and the WBC and platelet count are WN;- his hgb is currently WNL  - iron is adequate with only slight  decrease in the ferritn  - he is on oral iron with fergon bid    4/20/2021:  - latest available labs were from Dec 2020 and they were WNL    11/17/2021:  - latest hgb at 11.7 and adequate for him  - needs iron tablets refilled  - check labs every 6 months in iron panel    6/21/2022:  - latest hgb WNL at 15.3  - plast are 139,000  - iron panel adequate    12/13/2022:  - he is overdue for repeat labs  - no new issues    (3) Iron defic anemia in past    (4) DM         1. Other secondary thrombocytopenia        2. Pancytopenia        3. Iron deficiency anemia, unspecified iron deficiency anemia type        4. Leukopenia, unspecified type              PLAN:  1. Check labs every 6 months; continue oral iron bid (encouraged compliance)  2. F/u with PCP about his glucose and DM  3. encouraged compliance  4.  RTC in 6 months    Fax note to Efren    Total Time spent on patient:    I spent over 15 mins of time with the patient. Reviewed results of the recently ordered labs, tests, reports and studies; made directives with regards to the results. Over half of this time was spent couseling and coordinating care, making treatment and analytical decisions; ordering necessary labs, tests and studies; and discussing treatment options and setting up treatment plan(s) if indicated.          Discussion:       COVID-19 Discussion:    I had long discussion with patient and any applicable family about the COVID-19 coronavirus epidemic and the recommended precautions with regard to cancer and/or hematology patients. I have re-iterated the CDC recommendations for adequate hand washing, use of hand -like products, and coughing into elbow, etc. In addition, especially for our patients who are on chemotherapy and/or our otherwise immunocompromised patients, I have recommended avoidance of crowds, including movie theaters, restaurants, churches, etc. I have recommended avoidance of any sick or symptomatic family members and/or friends.  I have also recommended avoidance of any raw and unwashed food products, and general avoidance of food items that have not been prepared by themselves. The patient has been asked to call us immediately with any symptom developments, issues, questions or other general concerns.        I have explained all of the above in detail and the patient understands all of the current recommendation(s). I have answered all of their questions to the best of my ability and to their complete satisfaction.   The patient is to continue with the current management plan.            Electronically signed by Wing Arechiga MD

## 2022-12-13 ENCOUNTER — OFFICE VISIT (OUTPATIENT)
Dept: HEMATOLOGY/ONCOLOGY | Facility: CLINIC | Age: 62
End: 2022-12-13
Payer: MEDICARE

## 2022-12-13 ENCOUNTER — LAB VISIT (OUTPATIENT)
Dept: LAB | Facility: HOSPITAL | Age: 62
End: 2022-12-13
Attending: INTERNAL MEDICINE
Payer: MEDICARE

## 2022-12-13 VITALS
RESPIRATION RATE: 18 BRPM | TEMPERATURE: 98 F | HEART RATE: 70 BPM | HEIGHT: 64 IN | BODY MASS INDEX: 29.16 KG/M2 | DIASTOLIC BLOOD PRESSURE: 65 MMHG | SYSTOLIC BLOOD PRESSURE: 142 MMHG | WEIGHT: 170.81 LBS

## 2022-12-13 DIAGNOSIS — D69.59 OTHER SECONDARY THROMBOCYTOPENIA: Primary | ICD-10-CM

## 2022-12-13 DIAGNOSIS — D72.819 LEUKOPENIA, UNSPECIFIED TYPE: ICD-10-CM

## 2022-12-13 DIAGNOSIS — D50.9 IRON DEFICIENCY ANEMIA, UNSPECIFIED IRON DEFICIENCY ANEMIA TYPE: ICD-10-CM

## 2022-12-13 DIAGNOSIS — D53.9 NUTRITIONAL ANEMIA, UNSPECIFIED: ICD-10-CM

## 2022-12-13 DIAGNOSIS — D61.818 PANCYTOPENIA: ICD-10-CM

## 2022-12-13 DIAGNOSIS — D69.59 OTHER SECONDARY THROMBOCYTOPENIA: ICD-10-CM

## 2022-12-13 LAB
ALBUMIN SERPL BCP-MCNC: 3.9 G/DL (ref 3.5–5.2)
ALP SERPL-CCNC: 139 U/L (ref 55–135)
ALT SERPL W/O P-5'-P-CCNC: 13 U/L (ref 10–44)
ANION GAP SERPL CALC-SCNC: 8 MMOL/L (ref 8–16)
AST SERPL-CCNC: 16 U/L (ref 10–40)
BASOPHILS # BLD AUTO: 0.05 K/UL (ref 0–0.2)
BASOPHILS NFR BLD: 1 % (ref 0–1.9)
BILIRUB SERPL-MCNC: 0.6 MG/DL (ref 0.1–1)
BUN SERPL-MCNC: 28 MG/DL (ref 8–23)
CALCIUM SERPL-MCNC: 8.9 MG/DL (ref 8.7–10.5)
CHLORIDE SERPL-SCNC: 101 MMOL/L (ref 95–110)
CO2 SERPL-SCNC: 21 MMOL/L (ref 23–29)
CREAT SERPL-MCNC: 1.5 MG/DL (ref 0.5–1.4)
DIFFERENTIAL METHOD: ABNORMAL
EOSINOPHIL # BLD AUTO: 0.1 K/UL (ref 0–0.5)
EOSINOPHIL NFR BLD: 1 % (ref 0–8)
ERYTHROCYTE [DISTWIDTH] IN BLOOD BY AUTOMATED COUNT: 13.4 % (ref 11.5–14.5)
EST. GFR  (NO RACE VARIABLE): 52.6 ML/MIN/1.73 M^2
FERRITIN SERPL-MCNC: 104 NG/ML (ref 20–300)
FOLATE SERPL-MCNC: 17 NG/ML (ref 4–24)
GLUCOSE SERPL-MCNC: 265 MG/DL (ref 70–110)
HCT VFR BLD AUTO: 32.6 % (ref 40–54)
HGB BLD-MCNC: 11.2 G/DL (ref 14–18)
IMM GRANULOCYTES # BLD AUTO: 0.01 K/UL (ref 0–0.04)
IMM GRANULOCYTES NFR BLD AUTO: 0.2 % (ref 0–0.5)
IRON SERPL-MCNC: 61 UG/DL (ref 45–160)
LYMPHOCYTES # BLD AUTO: 0.8 K/UL (ref 1–4.8)
LYMPHOCYTES NFR BLD: 16.2 % (ref 18–48)
MCH RBC QN AUTO: 29.5 PG (ref 27–31)
MCHC RBC AUTO-ENTMCNC: 34.4 G/DL (ref 32–36)
MCV RBC AUTO: 86 FL (ref 82–98)
MONOCYTES # BLD AUTO: 0.2 K/UL (ref 0.3–1)
MONOCYTES NFR BLD: 4.9 % (ref 4–15)
NEUTROPHILS # BLD AUTO: 3.7 K/UL (ref 1.8–7.7)
NEUTROPHILS NFR BLD: 76.7 % (ref 38–73)
NRBC BLD-RTO: 0 /100 WBC
PLATELET # BLD AUTO: 273 K/UL (ref 150–450)
PMV BLD AUTO: 9.7 FL (ref 9.2–12.9)
POTASSIUM SERPL-SCNC: 4.5 MMOL/L (ref 3.5–5.1)
PROT SERPL-MCNC: 7.5 G/DL (ref 6–8.4)
RBC # BLD AUTO: 3.8 M/UL (ref 4.6–6.2)
SATURATED IRON: 22 % (ref 20–50)
SODIUM SERPL-SCNC: 130 MMOL/L (ref 136–145)
TOTAL IRON BINDING CAPACITY: 273 UG/DL (ref 250–450)
TRANSFERRIN SERPL-MCNC: 195 MG/DL (ref 200–375)
VIT B12 SERPL-MCNC: 480 PG/ML (ref 210–950)
WBC # BLD AUTO: 4.87 K/UL (ref 3.9–12.7)

## 2022-12-13 PROCEDURE — 3008F PR BODY MASS INDEX (BMI) DOCUMENTED: ICD-10-PCS | Mod: CPTII,S$GLB,, | Performed by: INTERNAL MEDICINE

## 2022-12-13 PROCEDURE — 3077F PR MOST RECENT SYSTOLIC BLOOD PRESSURE >= 140 MM HG: ICD-10-PCS | Mod: CPTII,S$GLB,, | Performed by: INTERNAL MEDICINE

## 2022-12-13 PROCEDURE — 99213 PR OFFICE/OUTPT VISIT, EST, LEVL III, 20-29 MIN: ICD-10-PCS | Mod: S$GLB,,, | Performed by: INTERNAL MEDICINE

## 2022-12-13 PROCEDURE — 3044F HG A1C LEVEL LT 7.0%: CPT | Mod: CPTII,S$GLB,, | Performed by: INTERNAL MEDICINE

## 2022-12-13 PROCEDURE — 82728 ASSAY OF FERRITIN: CPT | Performed by: INTERNAL MEDICINE

## 2022-12-13 PROCEDURE — 80053 COMPREHEN METABOLIC PANEL: CPT | Performed by: INTERNAL MEDICINE

## 2022-12-13 PROCEDURE — 3078F PR MOST RECENT DIASTOLIC BLOOD PRESSURE < 80 MM HG: ICD-10-PCS | Mod: CPTII,S$GLB,, | Performed by: INTERNAL MEDICINE

## 2022-12-13 PROCEDURE — 82746 ASSAY OF FOLIC ACID SERUM: CPT | Performed by: INTERNAL MEDICINE

## 2022-12-13 PROCEDURE — 99213 OFFICE O/P EST LOW 20 MIN: CPT | Mod: S$GLB,,, | Performed by: INTERNAL MEDICINE

## 2022-12-13 PROCEDURE — 3078F DIAST BP <80 MM HG: CPT | Mod: CPTII,S$GLB,, | Performed by: INTERNAL MEDICINE

## 2022-12-13 PROCEDURE — 1159F PR MEDICATION LIST DOCUMENTED IN MEDICAL RECORD: ICD-10-PCS | Mod: CPTII,S$GLB,, | Performed by: INTERNAL MEDICINE

## 2022-12-13 PROCEDURE — 82607 VITAMIN B-12: CPT | Performed by: INTERNAL MEDICINE

## 2022-12-13 PROCEDURE — 84466 ASSAY OF TRANSFERRIN: CPT | Performed by: INTERNAL MEDICINE

## 2022-12-13 PROCEDURE — 1160F PR REVIEW ALL MEDS BY PRESCRIBER/CLIN PHARMACIST DOCUMENTED: ICD-10-PCS | Mod: CPTII,S$GLB,, | Performed by: INTERNAL MEDICINE

## 2022-12-13 PROCEDURE — 85025 COMPLETE CBC W/AUTO DIFF WBC: CPT | Performed by: INTERNAL MEDICINE

## 2022-12-13 PROCEDURE — 1160F RVW MEDS BY RX/DR IN RCRD: CPT | Mod: CPTII,S$GLB,, | Performed by: INTERNAL MEDICINE

## 2022-12-13 PROCEDURE — 3008F BODY MASS INDEX DOCD: CPT | Mod: CPTII,S$GLB,, | Performed by: INTERNAL MEDICINE

## 2022-12-13 PROCEDURE — 3077F SYST BP >= 140 MM HG: CPT | Mod: CPTII,S$GLB,, | Performed by: INTERNAL MEDICINE

## 2022-12-13 PROCEDURE — 3044F PR MOST RECENT HEMOGLOBIN A1C LEVEL <7.0%: ICD-10-PCS | Mod: CPTII,S$GLB,, | Performed by: INTERNAL MEDICINE

## 2022-12-13 PROCEDURE — 1159F MED LIST DOCD IN RCRD: CPT | Mod: CPTII,S$GLB,, | Performed by: INTERNAL MEDICINE

## 2022-12-13 PROCEDURE — 36415 COLL VENOUS BLD VENIPUNCTURE: CPT | Performed by: INTERNAL MEDICINE

## 2022-12-15 ENCOUNTER — TELEPHONE (OUTPATIENT)
Dept: FAMILY MEDICINE | Facility: CLINIC | Age: 62
End: 2022-12-15

## 2022-12-15 NOTE — TELEPHONE ENCOUNTER
----- Message from Margi Palm sent at 12/15/2022 11:43 AM CST -----    10:53  Mell  a nurse from [facility]. She needs a call back about the patients B/S  Mell's # [phone] GH

## 2023-01-06 DIAGNOSIS — E11.42 DM TYPE 2 WITH DIABETIC PERIPHERAL NEUROPATHY: ICD-10-CM

## 2023-01-06 RX ORDER — PEN NEEDLE, DIABETIC 31 GX5/16"
NEEDLE, DISPOSABLE MISCELLANEOUS
Qty: 100 EACH | Refills: 1 | Status: SHIPPED | OUTPATIENT
Start: 2023-01-06 | End: 2023-05-10 | Stop reason: SDUPTHER

## 2023-01-06 NOTE — TELEPHONE ENCOUNTER
"----- Message from Haven Marsh sent at 1/6/2023  9:46 AM CST -----  Refill on: BD ULTRA-FINE MINI PEN NEEDLE 31 gauge x 3/16" [id number] [address]  [place]    [phone]    "

## 2023-01-09 ENCOUNTER — TELEPHONE (OUTPATIENT)
Dept: FAMILY MEDICINE | Facility: CLINIC | Age: 63
End: 2023-01-09

## 2023-01-09 DIAGNOSIS — E11.42 DM TYPE 2 WITH DIABETIC PERIPHERAL NEUROPATHY: Primary | ICD-10-CM

## 2023-01-09 DIAGNOSIS — Z79.899 REFERRED FOR MANAGEMENT OF MEDICATION THERAPY: ICD-10-CM

## 2023-01-09 NOTE — TELEPHONE ENCOUNTER
----- Message from Haven Marsh sent at 1/9/2023  2:02 PM CST -----  [facility] would like to get a home health referral for his uncontrol Diabetic   (Anu) [phone]

## 2023-01-09 NOTE — TELEPHONE ENCOUNTER
----- Message from Haven Marsh sent at 1/9/2023  3:44 PM CST -----  [facility] would like a phone call to find out about pt insurance and they need clinical notes. Ugbfd-[phone]

## 2023-01-10 ENCOUNTER — TELEPHONE (OUTPATIENT)
Dept: FAMILY MEDICINE | Facility: CLINIC | Age: 63
End: 2023-01-10

## 2023-01-10 NOTE — TELEPHONE ENCOUNTER
----- Message from Isaias Lion MA sent at 1/10/2023 10:55 AM CST -----  Regarding: Call back  Kian calling from [facility] calling to inform the nurse that they are not going to be able to take the pt. [phone]

## 2023-01-10 NOTE — TELEPHONE ENCOUNTER
----- Message from Kya Perez sent at 1/10/2023  1:42 PM CST -----  Debbi whitlock with [facility] in [place] is calling to say they can not accept him   [phone]

## 2023-01-20 NOTE — TELEPHONE ENCOUNTER
Spoke to Briana with [facility]. She looked at referral and said because he has psych diagnosis that they dont have a psych nurse. Told her that's not why we need HH. We need skilled nurse to check on him weekly, make sure meds are good, vitals, that he is an uncontrolled diabetic. She asked if he sees psych. I said yes that they are actually the ones who called us asking for us to order home health for his diabetes.    She said she will talk with her director and one of them will call me back

## 2023-01-20 NOTE — TELEPHONE ENCOUNTER
Can you call and get a state from [facility] stating why they were unable to accept pt

## 2023-01-23 ENCOUNTER — TELEPHONE (OUTPATIENT)
Dept: FAMILY MEDICINE | Facility: CLINIC | Age: 63
End: 2023-01-23

## 2023-01-31 DIAGNOSIS — K21.9 GASTROESOPHAGEAL REFLUX DISEASE WITHOUT ESOPHAGITIS: ICD-10-CM

## 2023-01-31 RX ORDER — PANTOPRAZOLE SODIUM 20 MG/1
20 TABLET, DELAYED RELEASE ORAL DAILY
Qty: 90 TABLET | Refills: 0 | Status: SHIPPED | OUTPATIENT
Start: 2023-01-31 | End: 2023-05-10

## 2023-01-31 RX ORDER — GABAPENTIN 400 MG/1
400 CAPSULE ORAL 2 TIMES DAILY
Qty: 180 CAPSULE | Refills: 1 | Status: ON HOLD | OUTPATIENT
Start: 2023-01-31 | End: 2023-06-14 | Stop reason: SDUPTHER

## 2023-01-31 NOTE — TELEPHONE ENCOUNTER
----- Message from Veena Gong sent at 1/31/2023  9:09 AM CST -----  Patient  (Maryann)called and stated that the patient need a refill of his gabapentin and his pantoprazole called into [facility] in [place] if any questions please give her a call at [phone]

## 2023-02-20 ENCOUNTER — TELEPHONE (OUTPATIENT)
Dept: FAMILY MEDICINE | Facility: CLINIC | Age: 63
End: 2023-02-20

## 2023-02-20 NOTE — TELEPHONE ENCOUNTER
----- Message from Haven Marsh sent at 2/20/2023  8:37 AM CST -----  [facility] called to get pt a hospital follow up. They would like for us to call sister Ita [phone].

## 2023-02-23 PROCEDURE — G0180 MD CERTIFICATION HHA PATIENT: HCPCS | Mod: ,,, | Performed by: FAMILY MEDICINE

## 2023-02-23 PROCEDURE — G0180 PR HOME HEALTH MD CERTIFICATION: ICD-10-PCS | Mod: ,,, | Performed by: FAMILY MEDICINE

## 2023-03-08 ENCOUNTER — TELEPHONE (OUTPATIENT)
Dept: FAMILY MEDICINE | Facility: CLINIC | Age: 63
End: 2023-03-08

## 2023-03-08 NOTE — TELEPHONE ENCOUNTER
----- Message from Haven Marsh sent at 3/8/2023  3:37 PM CST -----  Ita from [facility] called to schedule a hospital follow up. Ita would like for us to call her to schedule this follow up. [phone]

## 2023-03-28 ENCOUNTER — TELEPHONE (OUTPATIENT)
Dept: ENDOCRINOLOGY | Facility: CLINIC | Age: 63
End: 2023-03-28
Payer: MEDICARE

## 2023-03-28 DIAGNOSIS — Z79.4 TYPE 2 DIABETES MELLITUS WITH DIABETIC NEUROPATHY, WITH LONG-TERM CURRENT USE OF INSULIN: ICD-10-CM

## 2023-03-28 DIAGNOSIS — E11.40 TYPE 2 DIABETES MELLITUS WITH DIABETIC NEUROPATHY, WITH LONG-TERM CURRENT USE OF INSULIN: ICD-10-CM

## 2023-03-28 NOTE — TELEPHONE ENCOUNTER
----- Message from Isaias Lion MA sent at 3/28/2023  3:49 PM CDT -----  Regarding: refills/appt  Pt sister calling for a refill on both insulin pens to be sent to [facility]. Appt set for 4/13/23. # -silvio benitez

## 2023-03-28 NOTE — TELEPHONE ENCOUNTER
----- Message from Zainab Moe sent at 3/28/2023  3:50 PM CDT -----  Who Called:pt       What is the reqeust in detail: pt is requesting a call back pt is completely out of insulin and need something sent in ASAP. Please advise       [facility]  [address] [place] [address] · Phone: [phone] ·      Can the clinic reply by [facility]? no      Best Call Back Number:[phone]      Additional Information:

## 2023-03-29 ENCOUNTER — OFFICE VISIT (OUTPATIENT)
Dept: URGENT CARE | Facility: CLINIC | Age: 63
End: 2023-03-29
Payer: MEDICARE

## 2023-03-29 VITALS
HEART RATE: 80 BPM | HEIGHT: 64 IN | TEMPERATURE: 98 F | OXYGEN SATURATION: 97 % | WEIGHT: 172 LBS | DIASTOLIC BLOOD PRESSURE: 80 MMHG | BODY MASS INDEX: 29.37 KG/M2 | SYSTOLIC BLOOD PRESSURE: 150 MMHG | RESPIRATION RATE: 18 BRPM

## 2023-03-29 DIAGNOSIS — H61.21 IMPACTED CERUMEN OF RIGHT EAR: Primary | ICD-10-CM

## 2023-03-29 PROCEDURE — 69209 REMOVE IMPACTED EAR WAX UNI: CPT | Mod: RT,S$GLB,, | Performed by: PHYSICIAN ASSISTANT

## 2023-03-29 PROCEDURE — 69209 PR REMOVAL IMPACTED CERUMEN USING IRRIGATION/LAVAGE, UNILATERAL: ICD-10-PCS | Mod: RT,S$GLB,, | Performed by: PHYSICIAN ASSISTANT

## 2023-03-29 PROCEDURE — 99212 PR OFFICE/OUTPT VISIT, EST, LEVL II, 10-19 MIN: ICD-10-PCS | Mod: 25,S$GLB,, | Performed by: PHYSICIAN ASSISTANT

## 2023-03-29 PROCEDURE — 99212 OFFICE O/P EST SF 10 MIN: CPT | Mod: 25,S$GLB,, | Performed by: PHYSICIAN ASSISTANT

## 2023-03-29 RX ORDER — INSULIN ASPART 100 [IU]/ML
8 INJECTION, SOLUTION INTRAVENOUS; SUBCUTANEOUS
Qty: 15 ML | Refills: 2 | Status: SHIPPED | OUTPATIENT
Start: 2023-03-29 | End: 2023-05-10 | Stop reason: SDUPTHER

## 2023-03-29 RX ORDER — INSULIN GLARGINE 100 [IU]/ML
26 INJECTION, SOLUTION SUBCUTANEOUS DAILY
Qty: 3 EACH | Refills: 3 | Status: SHIPPED | OUTPATIENT
Start: 2023-03-29 | End: 2023-05-10 | Stop reason: SDUPTHER

## 2023-03-29 NOTE — PATIENT INSTRUCTIONS

## 2023-03-29 NOTE — TELEPHONE ENCOUNTER
The patient's [place] has been [place] and [place] [facility] [place] [address] [address]  [address] [address]  [facility] [address]  Phone: [phone] Fax: [phone]

## 2023-03-29 NOTE — PROGRESS NOTES
"Subjective:      Patient ID: Matthieu Jovel is a 63 y.o. male.    Vitals:  height is 5' 4" (1.626 m) and weight is 78 kg (172 lb). His oral temperature is 97.9 °F (36.6 °C). His blood pressure is 150/80 (abnormal) and his pulse is 80. His respiration is 18 and oxygen saturation is 97%.     Chief Complaint: ear swooshing    Patient coming in, started two days ago Monday 3/27 , thinks he has water in ear, sounds like its slushing around in there. Ear pain inside right ear.hasnt taken any meds for it      Other  This is a new problem. The current episode started in the past 7 days. The problem occurs constantly. The problem has been unchanged. Pertinent negatives include no fever. Associated symptoms comments: Just in ear, swooshing . Nothing aggravates the symptoms. He has tried nothing for the symptoms.     Constitution: Negative for fever.   HENT:  Positive for ear pain. Negative for postnasal drip.     Objective:     Physical Exam   Constitutional:  Non-toxic appearance. No distress.   HENT:   Head: Normocephalic and atraumatic.   Ears:   Right Ear: External ear normal. impacted cerumen  Left Ear: Tympanic membrane, external ear and ear canal normal.   Cardiovascular: Normal rate and regular rhythm.   Pulmonary/Chest: Effort normal and breath sounds normal. He has no wheezes. He has no rhonchi. He has no rales.   Neurological: He is alert.   Skin: Skin is warm, dry, not diaphoretic and not pale. jaundice  Psychiatric: Mood normal.   Nursing note and vitals reviewed.    Assessment:     1. Impacted cerumen of right ear        Plan:       Impacted cerumen of right ear  -     Ear wax removal    CERUMEN REMOVAL (SEPARATE PROCEDURE IN OFFICE)     Procedure: Removal of impacted cerumen right  Pre Procedure Diagnosis: Cerumen Impaction  Post Procedure Diagnosis: Cerumen Impaction     Verbal informed consent : Reviewed with the patient the risk of trauma to ear canal, ear drum or hearing, discomfort during procedure " and/or inability to remove cerumen impaction in one session or unforeseen events or complications.     Anesthesia: None needed    Procedure in detail:  Utilizing the following: Lavage. The impacted cerumen of the ear canal(s) was removed atraumatically with TM and EAC then inspected and found to be clear of wax.        Tympanic Membrane and Ear Canal Findings and examination after cerumen removal:Right Ear: normal Left Ear: normal     Complications: No      TM clear post removal bilaterally     Condition:  Improved/Good (patient feels much improved)    Patient Instructions   You must understand that you've received an Urgent Care treatment only and that you may be released before all your medical problems are known or treated. You, the patient, will arrange for follow up care as instructed.  Follow up with your PCP or specialty clinic as directed in the next 1-2 weeks if not improved or as needed.  You can call [phone] to schedule an appointment with the appropriate provider.  If your condition worsens we recommend that you receive another evaluation at the emergency room immediately or contact your primary medical clinics after hours call service to discuss your concerns.  Please return here or go to the Emergency Department for any concerns or worsening of condition.

## 2023-03-31 ENCOUNTER — PATIENT OUTREACH (OUTPATIENT)
Dept: ADMINISTRATIVE | Facility: HOSPITAL | Age: 63
End: 2023-03-31
Payer: MEDICARE

## 2023-03-31 ENCOUNTER — PATIENT MESSAGE (OUTPATIENT)
Dept: ADMINISTRATIVE | Facility: HOSPITAL | Age: 63
End: 2023-03-31
Payer: MEDICARE

## 2023-03-31 NOTE — PROGRESS NOTES
Eye Exam Gap Report Review. Care Everywhere updated and reviewed.     Media reviewed for overdue HM. No records found.    Immunizations reviewed.

## 2023-04-03 ENCOUNTER — EXTERNAL HOME HEALTH (OUTPATIENT)
Dept: HOME HEALTH SERVICES | Facility: HOSPITAL | Age: 63
End: 2023-04-03
Payer: MEDICARE

## 2023-04-03 ENCOUNTER — CLINICAL SUPPORT (OUTPATIENT)
Dept: URGENT CARE | Facility: CLINIC | Age: 63
End: 2023-04-03
Payer: MEDICARE

## 2023-04-03 VITALS
BODY MASS INDEX: 29.37 KG/M2 | HEIGHT: 64 IN | SYSTOLIC BLOOD PRESSURE: 163 MMHG | TEMPERATURE: 99 F | DIASTOLIC BLOOD PRESSURE: 80 MMHG | HEART RATE: 85 BPM | OXYGEN SATURATION: 98 % | WEIGHT: 172 LBS

## 2023-04-03 DIAGNOSIS — H66.001 ACUTE SUPPURATIVE OTITIS MEDIA OF RIGHT EAR WITHOUT SPONTANEOUS RUPTURE OF TYMPANIC MEMBRANE, RECURRENCE NOT SPECIFIED: Primary | ICD-10-CM

## 2023-04-03 PROCEDURE — 99212 OFFICE O/P EST SF 10 MIN: CPT | Mod: S$GLB,,, | Performed by: PHYSICIAN ASSISTANT

## 2023-04-03 PROCEDURE — 99212 PR OFFICE/OUTPT VISIT, EST, LEVL II, 10-19 MIN: ICD-10-PCS | Mod: S$GLB,,, | Performed by: PHYSICIAN ASSISTANT

## 2023-04-03 RX ORDER — FLUTICASONE PROPIONATE 50 MCG
1 SPRAY, SUSPENSION (ML) NASAL 2 TIMES DAILY
Qty: 16 G | Refills: 0 | Status: SHIPPED | OUTPATIENT
Start: 2023-04-03 | End: 2023-05-10

## 2023-04-03 RX ORDER — AMOXICILLIN AND CLAVULANATE POTASSIUM 875; 125 MG/1; MG/1
1 TABLET, FILM COATED ORAL 2 TIMES DAILY
Qty: 20 TABLET | Refills: 0 | Status: SHIPPED | OUTPATIENT
Start: 2023-04-03 | End: 2023-04-13

## 2023-04-03 RX ORDER — PREDNISONE 20 MG/1
20 TABLET ORAL DAILY
Qty: 5 TABLET | Refills: 0 | Status: SHIPPED | OUTPATIENT
Start: 2023-04-03 | End: 2023-04-08

## 2023-04-03 NOTE — PROGRESS NOTES
"Subjective:      Patient ID: Matthieu Jovel is a 63 y.o. male.    Vitals:  height is 5' 4" (1.626 m) and weight is 78 kg (172 lb). His temperature is 98.9 °F (37.2 °C). His blood pressure is 163/80 (abnormal) and his pulse is 85. His oxygen saturation is 98%.     Chief Complaint: Otalgia    Right ear feels "wishy washy" and stopped up for approximately 1 week   Patient states he can hear water in his ear   Patient has been using heating pad for ear pain, pain is worse at night   His nose also gets "stopped up" at night.      Otalgia   There is pain in the right ear. This is a recurrent problem. The current episode started 1 to 4 weeks ago. The problem occurs constantly. The problem has been gradually worsening. There has been no fever. The pain is at a severity of 6/10. Pertinent negatives include no abdominal pain, coughing, diarrhea, ear discharge, headaches, hearing loss, neck pain, rash, rhinorrhea, sore throat or vomiting. He has tried heat packs for the symptoms. The treatment provided no relief. There is no history of a chronic ear infection, hearing loss or a tympanostomy tube.     HENT:  Positive for ear pain. Negative for ear discharge, hearing loss and sore throat.    Neck: Negative for neck pain.   Respiratory:  Negative for cough.    Gastrointestinal:  Negative for abdominal pain, vomiting and diarrhea.   Skin:  Negative for rash.   Neurological:  Negative for headaches.    Objective:     Physical Exam   HENT:   Head: Normocephalic and atraumatic.   Ears:   Right Ear: External ear and ear canal normal. Tympanic membrane is bulging. A middle ear effusion is present.   Left Ear: Tympanic membrane, external ear and ear canal normal.   Pulmonary/Chest: Effort normal. No respiratory distress.   Psychiatric: His mood appears anxious. He is hyperactive. He expresses impulsivity.   Nursing note and vitals reviewed.    Assessment:     1. Acute suppurative otitis media of right ear without spontaneous rupture " of tympanic membrane, recurrence not specified        Plan:       Acute suppurative otitis media of right ear without spontaneous rupture of tympanic membrane, recurrence not specified    Other orders  -     predniSONE (DELTASONE) 20 MG tablet; Take 1 tablet (20 mg total) by mouth once daily. for 5 days  Dispense: 5 tablet; Refill: 0  -     fluticasone propionate (FLONASE ALLERGY RELIEF) 50 mcg/actuation nasal spray; 1 spray (50 mcg total) by Each Nostril route 2 (two) times daily.  Dispense: 16 g; Refill: 0  -     amoxicillin-clavulanate 875-125mg (AUGMENTIN) 875-125 mg per tablet; Take 1 tablet by mouth 2 (two) times daily. for 10 days  Dispense: 20 tablet; Refill: 0      Patient Instructions   You must understand that you've received an Urgent Care treatment only and that you may be released before all your medical problems are known or treated. You, the patient, will arrange for follow up care as instructed.  Follow up with your PCP or specialty clinic as directed in the next 1-2 weeks if not improved or as needed.  You can call [phone] to schedule an appointment with the appropriate provider.  If your condition worsens we recommend that you receive another evaluation at the emergency room immediately or contact your primary medical clinics after hours call service to discuss your concerns.  Please return here or go to the Emergency Department for any concerns or worsening of condition.

## 2023-04-05 DIAGNOSIS — E11.42 DM TYPE 2 WITH DIABETIC PERIPHERAL NEUROPATHY: ICD-10-CM

## 2023-04-05 NOTE — TELEPHONE ENCOUNTER
----- Message from Chantel Drew sent at 4/5/2023  1:56 PM CDT -----  Regarding: prescription  Contact: SisterIta  Type:  Diabetic/Medical Supplies Request    Name of Caller:  Ita malagon  What supplies are needed:  test strips   What is the brand of the supplies:   One Touch Verio Meter  Refill or New Rx:  refill  If checking glucose, how many times do they check it?:  3xday  Who prescribed the original supplies:  Danyell Brumfield  Pharmacy/Company Name, Phone #, Location:  [facility]  Requesting a Call Back:  yes  Best Call Back Number:[phone]  Additional Information:  Patient is out of test strips and needs them today if possible. Please call patient's sister to advise.Thanks!

## 2023-04-06 ENCOUNTER — TELEPHONE (OUTPATIENT)
Dept: ENDOCRINOLOGY | Facility: CLINIC | Age: 63
End: 2023-04-06
Payer: MEDICARE

## 2023-04-10 ENCOUNTER — PATIENT OUTREACH (OUTPATIENT)
Dept: ADMINISTRATIVE | Facility: HOSPITAL | Age: 63
End: 2023-04-10
Payer: MEDICARE

## 2023-04-10 NOTE — PROGRESS NOTES
"Attempted to outreach patient regarding overdue/ due HM via "[facility]". No response after a week. Now sending outreach via mail out letter.    "

## 2023-04-10 NOTE — LETTER
April 10, 2023    Matthieu Jovel  [address]  [place] [address]             [facility]  [address]  [place] [place] [facility] [address]  Phone: [phone] [facility] is committed to your overall health.  To help you get the most out of each of your visits, we will review your information to make sure you are up to date on all of your recommended tests and/or procedures.       Your PCP  Peter Schwartz MD   found that you may be due for:                                          Eye Exam      If you have had any of the above done at another facility, please let us know where you went so that we can request your record. So that your chart with  [facility] will be complete.  If you would like to schedule any of these, please contact us at [phone].      Thank you for choosing [facility] .    Po DEAL  Clinical Care Coordinator    [facility] / [facility]  [phone] (Phone)  [phone] (Fax)

## 2023-04-11 ENCOUNTER — PATIENT MESSAGE (OUTPATIENT)
Dept: ADMINISTRATIVE | Facility: HOSPITAL | Age: 63
End: 2023-04-11
Payer: MEDICARE

## 2023-04-11 ENCOUNTER — TELEPHONE (OUTPATIENT)
Dept: FAMILY MEDICINE | Facility: CLINIC | Age: 63
End: 2023-04-11
Payer: MEDICARE

## 2023-04-11 NOTE — TELEPHONE ENCOUNTER
Spoke with Patient's sister, Ita. Assisted Patient with scheduling appointment with Sivan Pedro PA-C and then a follow up with Dr. Mena to establish care.

## 2023-04-11 NOTE — TELEPHONE ENCOUNTER
----- Message from Jaja Castañeda LPN sent at 4/11/2023 11:33 AM CDT -----  Not sure why this is being sent to us? Looks like it was made to be sent to another provider?    ----- Message -----  From: Lilliana Woo MA  Sent: 4/11/2023  11:30 AM CDT  To: Peter Schwartz Staff      ----- Message -----  From: Andrea Bliss  Sent: 4/11/2023  10:39 AM CDT  To: Trina Bedolla Staff    Type:  Sooner Appointment Request    Caller is requesting a sooner appointment.  Caller declined first available appointment listed below.  Caller will not accept being placed on the waitlist and is requesting a message be sent to doctor.    Name of Caller:  pt Ita malagon  When is the first available appointment?  5/22--said he need to be seen sooner--please call and advise  Symptoms:  est care/diabetic running out of meds/  Best Call Back Number: [phone]    Additional Information:  thank you           [phone]

## 2023-05-10 ENCOUNTER — OFFICE VISIT (OUTPATIENT)
Dept: FAMILY MEDICINE | Facility: CLINIC | Age: 63
End: 2023-05-10
Payer: MEDICARE

## 2023-05-10 ENCOUNTER — LAB VISIT (OUTPATIENT)
Dept: LAB | Facility: HOSPITAL | Age: 63
End: 2023-05-10
Payer: MEDICARE

## 2023-05-10 VITALS
SYSTOLIC BLOOD PRESSURE: 126 MMHG | DIASTOLIC BLOOD PRESSURE: 68 MMHG | HEIGHT: 64 IN | WEIGHT: 174.81 LBS | BODY MASS INDEX: 29.84 KG/M2 | OXYGEN SATURATION: 99 % | HEART RATE: 84 BPM

## 2023-05-10 DIAGNOSIS — Z00.00 HEALTHCARE MAINTENANCE: ICD-10-CM

## 2023-05-10 DIAGNOSIS — E11.40 TYPE 2 DIABETES MELLITUS WITH DIABETIC NEUROPATHY, WITH LONG-TERM CURRENT USE OF INSULIN: Primary | ICD-10-CM

## 2023-05-10 DIAGNOSIS — Z79.4 TYPE 2 DIABETES MELLITUS WITH DIABETIC NEUROPATHY, WITH LONG-TERM CURRENT USE OF INSULIN: Primary | ICD-10-CM

## 2023-05-10 DIAGNOSIS — E11.40 TYPE 2 DIABETES MELLITUS WITH DIABETIC NEUROPATHY, WITH LONG-TERM CURRENT USE OF INSULIN: ICD-10-CM

## 2023-05-10 DIAGNOSIS — F51.04 PSYCHOPHYSIOLOGICAL INSOMNIA: ICD-10-CM

## 2023-05-10 DIAGNOSIS — N18.32 STAGE 3B CHRONIC KIDNEY DISEASE: ICD-10-CM

## 2023-05-10 DIAGNOSIS — Z79.4 TYPE 2 DIABETES MELLITUS WITH DIABETIC NEUROPATHY, WITH LONG-TERM CURRENT USE OF INSULIN: ICD-10-CM

## 2023-05-10 PROBLEM — D64.9 NORMOCYTIC ANEMIA: Status: ACTIVE | Noted: 2017-07-25

## 2023-05-10 LAB
CHOLEST SERPL-MCNC: 224 MG/DL (ref 120–199)
CHOLEST/HDLC SERPL: 3.8 {RATIO} (ref 2–5)
ESTIMATED AVG GLUCOSE: 194 MG/DL (ref 68–131)
HBA1C MFR BLD: 8.4 % (ref 4–5.6)
HCV AB SERPL QL IA: NORMAL
HDLC SERPL-MCNC: 59 MG/DL (ref 40–75)
HDLC SERPL: 26.3 % (ref 20–50)
HIV 1+2 AB+HIV1 P24 AG SERPL QL IA: NORMAL
LDLC SERPL CALC-MCNC: 137.6 MG/DL (ref 63–159)
NONHDLC SERPL-MCNC: 165 MG/DL
TRIGL SERPL-MCNC: 137 MG/DL (ref 30–150)

## 2023-05-10 PROCEDURE — 3008F PR BODY MASS INDEX (BMI) DOCUMENTED: ICD-10-PCS | Mod: CPTII,S$GLB,, | Performed by: STUDENT IN AN ORGANIZED HEALTH CARE EDUCATION/TRAINING PROGRAM

## 2023-05-10 PROCEDURE — 83036 HEMOGLOBIN GLYCOSYLATED A1C: CPT | Performed by: STUDENT IN AN ORGANIZED HEALTH CARE EDUCATION/TRAINING PROGRAM

## 2023-05-10 PROCEDURE — 80061 LIPID PANEL: CPT | Performed by: STUDENT IN AN ORGANIZED HEALTH CARE EDUCATION/TRAINING PROGRAM

## 2023-05-10 PROCEDURE — 99999 PR PBB SHADOW E&M-EST. PATIENT-LVL III: CPT | Mod: PBBFAC,,, | Performed by: STUDENT IN AN ORGANIZED HEALTH CARE EDUCATION/TRAINING PROGRAM

## 2023-05-10 PROCEDURE — 3074F PR MOST RECENT SYSTOLIC BLOOD PRESSURE < 130 MM HG: ICD-10-PCS | Mod: CPTII,S$GLB,, | Performed by: STUDENT IN AN ORGANIZED HEALTH CARE EDUCATION/TRAINING PROGRAM

## 2023-05-10 PROCEDURE — 99214 OFFICE O/P EST MOD 30 MIN: CPT | Mod: S$GLB,,, | Performed by: STUDENT IN AN ORGANIZED HEALTH CARE EDUCATION/TRAINING PROGRAM

## 2023-05-10 PROCEDURE — 3074F SYST BP LT 130 MM HG: CPT | Mod: CPTII,S$GLB,, | Performed by: STUDENT IN AN ORGANIZED HEALTH CARE EDUCATION/TRAINING PROGRAM

## 2023-05-10 PROCEDURE — 99214 PR OFFICE/OUTPT VISIT, EST, LEVL IV, 30-39 MIN: ICD-10-PCS | Mod: S$GLB,,, | Performed by: STUDENT IN AN ORGANIZED HEALTH CARE EDUCATION/TRAINING PROGRAM

## 2023-05-10 PROCEDURE — 3008F BODY MASS INDEX DOCD: CPT | Mod: CPTII,S$GLB,, | Performed by: STUDENT IN AN ORGANIZED HEALTH CARE EDUCATION/TRAINING PROGRAM

## 2023-05-10 PROCEDURE — 86803 HEPATITIS C AB TEST: CPT | Performed by: STUDENT IN AN ORGANIZED HEALTH CARE EDUCATION/TRAINING PROGRAM

## 2023-05-10 PROCEDURE — 3078F PR MOST RECENT DIASTOLIC BLOOD PRESSURE < 80 MM HG: ICD-10-PCS | Mod: CPTII,S$GLB,, | Performed by: STUDENT IN AN ORGANIZED HEALTH CARE EDUCATION/TRAINING PROGRAM

## 2023-05-10 PROCEDURE — 3078F DIAST BP <80 MM HG: CPT | Mod: CPTII,S$GLB,, | Performed by: STUDENT IN AN ORGANIZED HEALTH CARE EDUCATION/TRAINING PROGRAM

## 2023-05-10 PROCEDURE — 87389 HIV-1 AG W/HIV-1&-2 AB AG IA: CPT | Performed by: STUDENT IN AN ORGANIZED HEALTH CARE EDUCATION/TRAINING PROGRAM

## 2023-05-10 PROCEDURE — 99999 PR PBB SHADOW E&M-EST. PATIENT-LVL III: ICD-10-PCS | Mod: PBBFAC,,, | Performed by: STUDENT IN AN ORGANIZED HEALTH CARE EDUCATION/TRAINING PROGRAM

## 2023-05-10 PROCEDURE — 36415 COLL VENOUS BLD VENIPUNCTURE: CPT | Mod: PO | Performed by: STUDENT IN AN ORGANIZED HEALTH CARE EDUCATION/TRAINING PROGRAM

## 2023-05-10 RX ORDER — INSULIN ASPART 100 [IU]/ML
8 INJECTION, SOLUTION INTRAVENOUS; SUBCUTANEOUS
Qty: 15 ML | Refills: 2 | Status: SHIPPED | OUTPATIENT
Start: 2023-05-10 | End: 2023-07-06

## 2023-05-10 RX ORDER — LANCETS 33 GAUGE
1 EACH MISCELLANEOUS 4 TIMES DAILY
Qty: 200 EACH | Refills: 1 | Status: SHIPPED | OUTPATIENT
Start: 2023-05-10 | End: 2023-05-30 | Stop reason: SDUPTHER

## 2023-05-10 RX ORDER — DIPHENHYDRAMINE HCL 25 MG
25 TABLET ORAL NIGHTLY PRN
Qty: 90 TABLET | Refills: 3 | Status: SHIPPED | OUTPATIENT
Start: 2023-05-10 | End: 2023-05-10

## 2023-05-10 RX ORDER — HYDROXYZINE PAMOATE 25 MG/1
25 CAPSULE ORAL 3 TIMES DAILY PRN
COMMUNITY

## 2023-05-10 RX ORDER — INSULIN GLARGINE 100 [IU]/ML
26 INJECTION, SOLUTION SUBCUTANEOUS DAILY
Qty: 3 EACH | Refills: 3 | Status: SHIPPED | OUTPATIENT
Start: 2023-05-10

## 2023-05-10 RX ORDER — DIVALPROEX SODIUM 250 MG/1
250 TABLET, FILM COATED, EXTENDED RELEASE ORAL EVERY 12 HOURS
Status: ON HOLD | COMMUNITY
End: 2023-06-14 | Stop reason: HOSPADM

## 2023-05-10 RX ORDER — DIPHENHYDRAMINE HCL 25 MG
50 TABLET ORAL NIGHTLY PRN
Qty: 90 TABLET | Refills: 3 | Status: ON HOLD | OUTPATIENT
Start: 2023-05-10 | End: 2023-06-14 | Stop reason: HOSPADM

## 2023-05-10 RX ORDER — PEN NEEDLE, DIABETIC 31 GX5/16"
NEEDLE, DISPOSABLE MISCELLANEOUS
Qty: 100 EACH | Refills: 1 | Status: SHIPPED | OUTPATIENT
Start: 2023-05-10 | End: 2023-05-30 | Stop reason: SDUPTHER

## 2023-05-10 RX ORDER — PRAZOSIN HYDROCHLORIDE 1 MG/1
1 CAPSULE ORAL NIGHTLY
COMMUNITY
End: 2024-01-11 | Stop reason: ALTCHOICE

## 2023-05-10 NOTE — ASSESSMENT & PLAN NOTE
Poorly controlled. Reviewed behavioral changes - handout given. Will prescribed the antihistamine portion of Advil PM. Counseled on avoiding NSAIDs due to kidney disease.

## 2023-05-10 NOTE — PROGRESS NOTES
"Name: Matthieu Jovel  MRN: [id number]  : 1960    HPI    Review of Systems     Patient Active Problem List   Diagnosis    Normocytic anemia    Decreased white blood cell count    Other secondary thrombocytopenia    Pancytopenia    Type 2 diabetes mellitus with diabetic neuropathy, with long-term current use of insulin       Current Outpatient Medications on File Prior to Visit   Medication Sig Dispense Refill    divalproex (DEPAKOTE) 500 MG TbEC Take 375 mg by mouth once daily.      gabapentin (NEURONTIN) 400 MG capsule Take 1 capsule (400 mg total) by mouth 2 (two) times a day. (Patient taking differently: Take 100 mg by mouth 2 (two) times a day.) 180 capsule 1    hydrOXYzine pamoate (VISTARIL) 25 MG Cap Take 25 mg by mouth 3 (three) times daily as needed.      prazosin (MINIPRESS) 1 MG Cap Take by mouth 2 (two) times daily.      [DISCONTINUED] insulin (BASAGLAR KWIKPEN U-100 INSULIN) glargine 100 units/mL SubQ pen Inject 26 Units into the skin once daily. 3 each 3    [DISCONTINUED] insulin aspart U-100 (NOVOLOG FLEXPEN U-100 INSULIN) 100 unit/mL (3 mL) InPn pen Inject 8 Units into the skin 3 (three) times daily with meals. Plus correction scale, max tdd 60u 15 mL 2    BD ULTRA-FINE MINI PEN NEEDLE 31 gauge x 3/16" Ndle INJECT 1 PEN NEEDLE UNDER THE SKIN FOUR TIMES DAILY 100 each 1    bethanechol (URECHOLINE) 25 MG Tab Take by mouth.      blood sugar diagnostic Strp To check BG 4 times daily, to use with insurance preferred meter 400 strip 1    CONTOUR METER Misc UTD  0    fluconazole (DIFLUCAN) 200 MG Tab Take 200 mg by mouth once daily.      LATUDA 80 mg Tab tablet TK 1 T PO BID  1    quetiapine (SEROQUEL) 300 MG Tab Take 300 mg by mouth once daily.      TRUEPLUS LANCETS 33 gauge Misc 1 lancet by In Vitro route 4 (four) times daily. 200 each 1    [DISCONTINUED] amitriptyline (ELAVIL) 25 MG tablet Take 1 tablet (25 mg total) by mouth every evening. (Patient not taking: Reported on 3/29/2023) 30 tablet 1    " [DISCONTINUED] amoxicillin-clavulanate 875-125mg (AUGMENTIN) 875-125 mg per tablet Take 1 tablet by mouth 2 (two) times daily. (Patient not taking: Reported on 3/29/2023) 14 tablet 0    [DISCONTINUED] benztropine (COGENTIN) 1 MG tablet Take 1 tablet (1 mg total) by mouth 2 (two) times daily. (Patient not taking: Reported on 3/29/2023) 180 tablet 2    [DISCONTINUED] ferrous gluconate (FERGON) 324 MG tablet TAKE 1 TABLET BY MOUTH TWICE DAILY (Patient not taking: Reported on 3/29/2023) 60 tablet 6    [DISCONTINUED] fluticasone propionate (FLONASE ALLERGY RELIEF) 50 mcg/actuation nasal spray 1 spray (50 mcg total) by Each Nostril route 2 (two) times daily. (Patient not taking: Reported on 5/10/2023) 16 g 0    [DISCONTINUED] furosemide (LASIX) 40 MG tablet Take 40 mg by mouth once daily.      [DISCONTINUED] LINZESS 290 mcg Cap capsule Take 1 capsule (290 mcg total) by mouth once daily. (Patient not taking: Reported on 3/29/2023) 90 capsule 1    [DISCONTINUED] meclizine (ANTIVERT) 25 mg tablet Take 1 tablet (25 mg total) by mouth daily as needed for Dizziness. (Patient not taking: Reported on 3/29/2023) 30 tablet 5    [DISCONTINUED] meclizine (ANTIVERT) 25 mg tablet Take 1 tablet (25 mg total) by mouth 3 (three) times daily as needed for Dizziness. (Patient not taking: Reported on 5/10/2023) 20 tablet 0    [DISCONTINUED] metFORMIN (GLUCOPHAGE-XR) 500 MG ER 24hr tablet Take 2 tablets (1,000 mg total) by mouth 2 (two) times daily with meals. (Patient not taking: Reported on 5/10/2023) 360 tablet 1    [DISCONTINUED] naproxen (NAPROSYN) 375 MG tablet Take 1 tablet (375 mg total) by mouth 2 (two) times daily with meals. (Patient not taking: Reported on 5/10/2023) 30 tablet 0    [DISCONTINUED] ondansetron (ZOFRAN-ODT) 4 MG TbDL Take 1 tablet (4 mg total) by mouth once daily. (Patient not taking: Reported on 5/10/2023) 30 tablet 2    [DISCONTINUED] OXcarbazepine (TRILEPTAL) 300 MG Tab Take 1 tablet (300 mg total) by mouth once  daily. (Patient not taking: Reported on 5/10/2023) 90 tablet 1    [DISCONTINUED] pantoprazole (PROTONIX) 20 MG tablet Take 1 tablet (20 mg total) by mouth once daily. (Patient not taking: Reported on 5/10/2023) 90 tablet 0    [DISCONTINUED] potassium chloride SA (K-DUR,KLOR-CON) 20 MEQ tablet Take 1 tablet (20 mEq total) by mouth once daily. (Patient not taking: Reported on 5/10/2023) 90 tablet 1    [DISCONTINUED] pravastatin (PRAVACHOL) 40 MG tablet Take 1 tablet (40 mg total) by mouth once daily. (Patient not taking: Reported on 5/10/2023) 90 tablet 1     No current facility-administered medications on file prior to visit.       Past Medical History:   Diagnosis Date    Development disorder, mixed     Diabetes mellitus type II     Mental and behavioral problems with communication (including speech)        Past Surgical History:   Procedure Laterality Date    FOOT SURGERY          Family History   Problem Relation Age of Onset    Diabetes Mother     Glaucoma Mother        Social History     Socioeconomic History    Marital status: Single   Tobacco Use    Smoking status: Former     Types: Cigarettes     Quit date: 1998     Years since quittin.0    Smokeless tobacco: Never   Substance and Sexual Activity    Alcohol use: No    Drug use: No       Review of patient's allergies indicates:   Allergen Reactions    Codeine     Morphine sulfate Other (See Comments)     Other reaction(s): Unknown        Vitals:    05/10/23 1104   BP: 126/68   Pulse: 84        Physical Exam     1. Type 2 diabetes mellitus with diabetic neuropathy, with long-term current use of insulin  Assessment & Plan:  Reportedly controlled - patient is compliant with medications but last A1c was in  - 7.6 at that time. Labs today.     Orders:  -     Hemoglobin A1C; Future  -     Microalbumin/creatinine urine ratio  -     Lipid Panel; Future; Expected date: 05/10/2023  -     insulin aspart U-100 (NOVOLOG FLEXPEN U-100 INSULIN) 100 unit/mL (3  "mL) InPn pen; Inject 8 Units into the skin 3 (three) times daily with meals. Plus correction scale, max tdd 60u  Dispense: 15 mL; Refill: 2  -     insulin (BASAGLAR KWIKPEN U-100 INSULIN) glargine 100 units/mL SubQ pen; Inject 26 Units into the skin once daily.  Dispense: 3 each; Refill: 3  -     BD ULTRA-FINE MINI PEN NEEDLE 31 gauge x 3/16" Ndle; INJECT 1 PEN NEEDLE UNDER THE SKIN FOUR TIMES DAILY  Dispense: 100 each; Refill: 1  -     blood sugar diagnostic Strp; To check BG 4 times daily, to use with insurance preferred meter  Dispense: 400 strip; Refill: 1  -     TRUEPLUS LANCETS 33 gauge Misc; 1 lancet by In Vitro route 4 (four) times daily.  Dispense: 200 each; Refill: 1    2. Stage 3b chronic kidney disease  Assessment & Plan:  Assumedly poorly controlled given recent use of Advil PM and recent increases in creatinine. He does follow outside nephrologist and is scheduled to see them soon. Labs as ordered.    Orders:  -     HIV 1/2 Ag/Ab (4th Gen); Future; Expected date: 05/10/2023    3. Psychophysiological insomnia  Assessment & Plan:  Poorly controlled. Reviewed behavioral changes - handout given. Will prescribed the antihistamine portion of Advil PM. Counseled on avoiding NSAIDs due to kidney disease.    Orders:  -     diphenhydrAMINE (BENADRYL ALLERGY) 25 mg tablet; Take 2 tablets (50 mg total) by mouth nightly as needed for Insomnia.  Dispense: 90 tablet; Refill: 3    4. Healthcare maintenance  -     Hepatitis C Antibody; Future; Expected date: 05/10/2023  -     HIV 1/2 Ag/Ab (4th Gen); Future; Expected date: 05/10/2023    Other orders  -     Discontinue: diphenhydrAMINE (BENADRYL ALLERGY) 25 mg tablet; Take 1 tablet (25 mg total) by mouth nightly as needed for Insomnia.  Dispense: 90 tablet; Refill: 3        Follow up in 2 months    Graeme Mena MD  05/10/2023       "

## 2023-05-10 NOTE — ASSESSMENT & PLAN NOTE
Reportedly controlled - patient is compliant with medications but last A1c was in 2021 - 7.6 at that time. Labs today.

## 2023-05-10 NOTE — ASSESSMENT & PLAN NOTE
Assumedly poorly controlled given recent use of Advil PM and recent increases in creatinine. He does follow outside nephrologist and is scheduled to see them soon. Labs as ordered.

## 2023-05-11 DIAGNOSIS — E11.40 TYPE 2 DIABETES MELLITUS WITH DIABETIC NEUROPATHY, WITH LONG-TERM CURRENT USE OF INSULIN: Primary | ICD-10-CM

## 2023-05-11 DIAGNOSIS — Z79.4 TYPE 2 DIABETES MELLITUS WITH DIABETIC NEUROPATHY, WITH LONG-TERM CURRENT USE OF INSULIN: Primary | ICD-10-CM

## 2023-05-11 RX ORDER — METFORMIN HYDROCHLORIDE 500 MG/1
500 TABLET, EXTENDED RELEASE ORAL 2 TIMES DAILY WITH MEALS
Qty: 180 TABLET | Refills: 3 | Status: SHIPPED | OUTPATIENT
Start: 2023-05-11 | End: 2024-05-10

## 2023-05-16 ENCOUNTER — TELEPHONE (OUTPATIENT)
Dept: FAMILY MEDICINE | Facility: CLINIC | Age: 63
End: 2023-05-16
Payer: MEDICARE

## 2023-05-16 NOTE — TELEPHONE ENCOUNTER
----- Message from Luis Manuel Pulliam sent at 5/16/2023 11:06 AM CDT -----  Regarding: results  Contact: LIANET JACKSON [[id number]]  Type: Needs Medical Advice  Who Called:  sister Jean Baptiste    Symptoms (please be specific):  na    How long has patient had these symptoms:  migue    Pharmacy name and phone #:  na    Best Call Back Number: [phone]    Additional Information: Pt needs test results and advice. Please call to advise.

## 2023-05-26 RX ORDER — INSULIN PUMP SYRINGE, 3 ML
EACH MISCELLANEOUS
Qty: 1 EACH | Refills: 0 | Status: SHIPPED | OUTPATIENT
Start: 2023-05-26 | End: 2024-05-25

## 2023-05-26 NOTE — TELEPHONE ENCOUNTER
Called patient stated he needed medication refills, patients needs a new contour meter and strips. Please advise refill pend

## 2023-05-26 NOTE — TELEPHONE ENCOUNTER
----- Message from Gamaliel Palm sent at 5/26/2023  1:20 PM CDT -----  Contact: self  Type: Needs Medical Advice  Who Called:  pt    Best Call Back Number: [phone]  Additional Information: Pt would like to speak with staff  Thank you

## 2023-05-26 NOTE — TELEPHONE ENCOUNTER
No care due was identified.  [facility] Embedded Care Due Messages. Reference number: [id number].   5/26/2023 1:48:15 PM CDT

## 2023-05-30 DIAGNOSIS — E11.40 TYPE 2 DIABETES MELLITUS WITH DIABETIC NEUROPATHY, WITH LONG-TERM CURRENT USE OF INSULIN: ICD-10-CM

## 2023-05-30 DIAGNOSIS — Z79.4 TYPE 2 DIABETES MELLITUS WITH DIABETIC NEUROPATHY, WITH LONG-TERM CURRENT USE OF INSULIN: ICD-10-CM

## 2023-05-30 NOTE — TELEPHONE ENCOUNTER
"----- Message from Jnaie Tenorio sent at 5/30/2023  2:14 PM CDT -----  Contact: patient  Type:  RX Refill Request    Who Called:  patient  Refill or New Rx:  refill  RX Name and Strength:    1.blood sugar diagnostic Strp  2.BD ULTRA-FINE MINI PEN NEEDLE 31 gauge x 3/16" Ndle  3.TRUEPLUS LANCETS 33 gauge Misc  How is the patient currently taking it? (ex. 1XDay): as directed  Is this a 30 day or 90 day RX:  90  Preferred Pharmacy with phone number:    [facility] [address] [facility] [address], [address] [facility]  [address] [facility] [address], [address] [address]  Phone: [phone] Fax: [phone]  Local or Mail Order:  local  Ordering Provider:  mil Gates Call Back Number:  [phone]  Additional Information:  patient requesting a call back asap- one touch meter - verio reflex      "

## 2023-05-30 NOTE — TELEPHONE ENCOUNTER
No care due was identified.  [facility] Embedded Care Due Messages. Reference number: [id number].   5/30/2023 2:53:14 PM CDT

## 2023-05-31 RX ORDER — LANCETS 33 GAUGE
1 EACH MISCELLANEOUS 4 TIMES DAILY
Qty: 200 EACH | Refills: 1 | Status: SHIPPED | OUTPATIENT
Start: 2023-05-31 | End: 2023-10-26

## 2023-05-31 RX ORDER — PEN NEEDLE, DIABETIC 31 GX5/16"
NEEDLE, DISPOSABLE MISCELLANEOUS
Qty: 100 EACH | Refills: 1 | Status: SHIPPED | OUTPATIENT
Start: 2023-05-31 | End: 2023-06-26

## 2023-05-31 NOTE — TELEPHONE ENCOUNTER
----- Message from Vita Mckinley sent at 5/30/2023  4:58 PM CDT -----  Type: Needs Medical Advice  Who Called:  Pt  Best Call Back Number: [phone]  Additional Information: Pt states he called earlier and has been waiting on a call bk, pl call bk to advise thanks

## 2023-06-02 DIAGNOSIS — Z79.4 TYPE 2 DIABETES MELLITUS WITH DIABETIC NEUROPATHY, WITH LONG-TERM CURRENT USE OF INSULIN: ICD-10-CM

## 2023-06-02 DIAGNOSIS — E11.40 TYPE 2 DIABETES MELLITUS WITH DIABETIC NEUROPATHY, WITH LONG-TERM CURRENT USE OF INSULIN: ICD-10-CM

## 2023-06-02 NOTE — TELEPHONE ENCOUNTER
----- Message from Casandra Gaming sent at 6/2/2023  1:17 PM CDT -----  Contact: pt  Pt is calling he is upset no one has called about his strips   Please give pt a call back [phone]

## 2023-06-02 NOTE — TELEPHONE ENCOUNTER
No care due was identified.  [facility] Embedded Care Due Messages. Reference number: [id number].   6/02/2023 1:35:41 PM CDT

## 2023-06-05 ENCOUNTER — TELEPHONE (OUTPATIENT)
Dept: HEMATOLOGY/ONCOLOGY | Facility: CLINIC | Age: 63
End: 2023-06-05

## 2023-06-05 NOTE — TELEPHONE ENCOUNTER
Talked to pt sister maria dolores regarding labs needed for upcoming appt  Verbalized understanding

## 2023-06-06 ENCOUNTER — TELEPHONE (OUTPATIENT)
Dept: FAMILY MEDICINE | Facility: CLINIC | Age: 63
End: 2023-06-06
Payer: MEDICARE

## 2023-06-06 NOTE — TELEPHONE ENCOUNTER
----- Message from Iris Avalos sent at 6/6/2023 10:06 AM CDT -----  Contact: [facility]  Type: Needs Medical Advice  Who Called: Patient   Best Call Back Number: [phone] ref# [id number]  Additional Information: Plz call to provide additional medical information. The medication name is insulin aspart U-100 (NOVOLOG FLEXPEN U-100 INSULIN) 100 unit/mL (3 mL) InPn pen.

## 2023-06-07 ENCOUNTER — TELEPHONE (OUTPATIENT)
Dept: FAMILY MEDICINE | Facility: CLINIC | Age: 63
End: 2023-06-07
Payer: MEDICARE

## 2023-06-07 NOTE — TELEPHONE ENCOUNTER
Lilliana PORTER Given the form for provider to document if there is a reason pt can not take the formulary drugs covered by his insurance.

## 2023-06-07 NOTE — TELEPHONE ENCOUNTER
----- Message from Renetta Fernandez sent at 6/7/2023  9:25 AM CDT -----  Contact: pt [phone]  Type: Needs Medical Advice  Who Called:  Danyell linda/ Kyle Rubalcava     Best Call Back Number: [phone]    Additional Information: Requesting a call back about pts Pa for insulin. They need to know other meds pt has tried in the past. Pls call back and advise

## 2023-06-11 PROBLEM — R56.9 SEIZURE: Status: ACTIVE | Noted: 2023-06-11

## 2023-06-12 PROBLEM — N18.30 ANEMIA, CHRONIC RENAL FAILURE, STAGE 3 (MODERATE): Status: ACTIVE | Noted: 2023-06-12

## 2023-06-12 PROBLEM — D64.89 ANEMIA DUE TO MULTIPLE MECHANISMS: Status: ACTIVE | Noted: 2023-06-12

## 2023-06-12 PROBLEM — D63.1 ANEMIA, CHRONIC RENAL FAILURE, STAGE 3 (MODERATE): Status: ACTIVE | Noted: 2023-06-12

## 2023-06-12 PROBLEM — D63.8 ANEMIA, CHRONIC DISEASE: Status: ACTIVE | Noted: 2023-06-12

## 2023-06-13 PROBLEM — F20.9 SCHIZOPHRENIA: Chronic | Status: ACTIVE | Noted: 2023-06-13

## 2023-06-13 PROBLEM — F44.5 PSYCHOGENIC NONEPILEPTIC SEIZURE: Status: ACTIVE | Noted: 2023-06-13

## 2023-06-14 ENCOUNTER — TELEPHONE (OUTPATIENT)
Dept: FAMILY MEDICINE | Facility: CLINIC | Age: 63
End: 2023-06-14
Payer: MEDICARE

## 2023-06-14 PROBLEM — R56.9 NEW ONSET SEIZURE: Status: RESOLVED | Noted: 2023-06-11 | Resolved: 2023-06-14

## 2023-06-14 NOTE — TELEPHONE ENCOUNTER
Spoke with Patient's sister, Ita. Scheduled Patient hospital follow up on 06/27/23 with Dr. Mena per Patient's preferences.

## 2023-06-14 NOTE — TELEPHONE ENCOUNTER
----- Message from Anna Heath sent at 6/14/2023  1:58 PM CDT -----  Type:  Sooner Appointment Request    Caller is requesting a sooner appointment.  Caller declined first available appointment listed below.  Caller will not accept being placed on the waitlist and is requesting a message be sent to doctor.    Name of Caller:  Elizabeth [place]  When is the first available appointment?  6/28  Best Call Back Number:  [phone] (pt's number)  Additional Information:  Elizabeth is calling to get a one week hospital f/u for Mr. Sommers. Please call Mr. Sommers back to advise. Thanks!

## 2023-06-24 DIAGNOSIS — E11.40 TYPE 2 DIABETES MELLITUS WITH DIABETIC NEUROPATHY, WITH LONG-TERM CURRENT USE OF INSULIN: ICD-10-CM

## 2023-06-24 DIAGNOSIS — Z79.4 TYPE 2 DIABETES MELLITUS WITH DIABETIC NEUROPATHY, WITH LONG-TERM CURRENT USE OF INSULIN: ICD-10-CM

## 2023-06-24 NOTE — TELEPHONE ENCOUNTER
No care due was identified.  [facility] Embedded Care Due Messages. Reference number: [id number].   6/24/2023 9:13:51 AM CDT

## 2023-06-26 RX ORDER — PEN NEEDLE, DIABETIC 31 GX5/16"
NEEDLE, DISPOSABLE MISCELLANEOUS
Qty: 100 EACH | Refills: 1 | Status: SHIPPED | OUTPATIENT
Start: 2023-06-26

## 2023-07-06 DIAGNOSIS — E11.40 TYPE 2 DIABETES MELLITUS WITH DIABETIC NEUROPATHY, WITH LONG-TERM CURRENT USE OF INSULIN: ICD-10-CM

## 2023-07-06 DIAGNOSIS — Z79.4 TYPE 2 DIABETES MELLITUS WITH DIABETIC NEUROPATHY, WITH LONG-TERM CURRENT USE OF INSULIN: ICD-10-CM

## 2023-07-06 RX ORDER — INSULIN ASPART 100 [IU]/ML
INJECTION, SOLUTION INTRAVENOUS; SUBCUTANEOUS
Qty: 15 ML | Refills: 2 | Status: SHIPPED | OUTPATIENT
Start: 2023-07-06 | End: 2023-10-20

## 2023-07-06 NOTE — TELEPHONE ENCOUNTER
No care due was identified.  [facility] Embedded Care Due Messages. Reference number: [id number].   7/06/2023 3:18:51 PM CDT

## 2023-07-10 ENCOUNTER — DOCUMENTATION ONLY (OUTPATIENT)
Dept: FAMILY MEDICINE | Facility: CLINIC | Age: 63
End: 2023-07-10
Payer: MEDICARE

## 2023-07-13 ENCOUNTER — DOCUMENTATION ONLY (OUTPATIENT)
Dept: FAMILY MEDICINE | Facility: CLINIC | Age: 63
End: 2023-07-13
Payer: MEDICARE

## 2023-07-13 NOTE — PROGRESS NOTES
LIANET JACKSON Key: [id number] - PA Case ID: [id number] - Rx #: [id number] help? Call us at [phone]  Outcome  Approved today  Request Reference Number: [id number]. BASAGLAR INJ 100UNIT is approved through 12/31/2023. Your patient may now fill this prescription and it will be covered.  Drug  Basaglar KwikPen 100UNIT/ML pen-injectors  Form  [facility] Medicare Part D Electronic Prior Authorization Form (2017 [employer]

## 2023-07-18 ENCOUNTER — TELEPHONE (OUTPATIENT)
Dept: FAMILY MEDICINE | Facility: CLINIC | Age: 63
End: 2023-07-18
Payer: MEDICARE

## 2023-07-18 NOTE — TELEPHONE ENCOUNTER
The nurse from [place] called stating that the patient's blood glucose level was at 475. Please advise.

## 2023-07-18 NOTE — TELEPHONE ENCOUNTER
----- Message from Heladio Granda MA sent at 7/17/2023  4:57 PM CDT -----  Contact: Adele chavarria [facility]    ----- Message -----  From: Genesis Wilkinson  Sent: 7/17/2023   4:43 PM CDT  To: Trina Bedolla Staff    Adele with [facility] is calling with pts blood sugar level, stated it is seriously high right now at 475 and she needs to let the doctor know. If we can let them know and call Ms Angulo back at [phone]. Thank You

## 2023-08-23 DIAGNOSIS — E11.9 TYPE 2 DIABETES MELLITUS WITHOUT COMPLICATION: ICD-10-CM

## 2023-10-20 DIAGNOSIS — E11.40 TYPE 2 DIABETES MELLITUS WITH DIABETIC NEUROPATHY, WITH LONG-TERM CURRENT USE OF INSULIN: ICD-10-CM

## 2023-10-20 DIAGNOSIS — Z79.4 TYPE 2 DIABETES MELLITUS WITH DIABETIC NEUROPATHY, WITH LONG-TERM CURRENT USE OF INSULIN: ICD-10-CM

## 2023-10-20 RX ORDER — INSULIN ASPART 100 [IU]/ML
INJECTION, SOLUTION INTRAVENOUS; SUBCUTANEOUS
Qty: 15 ML | Refills: 2 | Status: SHIPPED | OUTPATIENT
Start: 2023-10-20 | End: 2024-01-11

## 2023-10-20 NOTE — TELEPHONE ENCOUNTER
No care due was identified.  [facility] Embedded Care Due Messages. Reference number: [id number].   10/20/2023 2:49:49 PM CDT

## 2023-10-26 DIAGNOSIS — Z79.4 TYPE 2 DIABETES MELLITUS WITH DIABETIC NEUROPATHY, WITH LONG-TERM CURRENT USE OF INSULIN: ICD-10-CM

## 2023-10-26 DIAGNOSIS — E11.40 TYPE 2 DIABETES MELLITUS WITH DIABETIC NEUROPATHY, WITH LONG-TERM CURRENT USE OF INSULIN: ICD-10-CM

## 2023-10-26 RX ORDER — LANCETS 33 GAUGE
EACH MISCELLANEOUS
Qty: 100 EACH | Refills: 1 | Status: SHIPPED | OUTPATIENT
Start: 2023-10-26 | End: 2024-01-04

## 2023-10-26 NOTE — TELEPHONE ENCOUNTER
Care Due:                  Date            Visit Type   Department     Provider  --------------------------------------------------------------------------------                                NP -                              PRIMARY      [facility] FAMILY  Last Visit: 05-      CARE ([facility])   MEDICINE       Graeme Mena  Next Visit: None Scheduled  None         None Found                                                            Last  Test          Frequency    Reason                     Performed    Due Date  --------------------------------------------------------------------------------    HBA1C.......  6 months...  insulin, metFORMIN.......  05- 11-    [facility] Embedded Care Due Messages. Reference number: [id number].   10/26/2023 8:24:34 AM CDT

## 2023-11-08 ENCOUNTER — PATIENT MESSAGE (OUTPATIENT)
Dept: ADMINISTRATIVE | Facility: HOSPITAL | Age: 63
End: 2023-11-08
Payer: MEDICARE

## 2023-11-18 DIAGNOSIS — E11.40 TYPE 2 DIABETES MELLITUS WITH DIABETIC NEUROPATHY, WITH LONG-TERM CURRENT USE OF INSULIN: ICD-10-CM

## 2023-11-18 DIAGNOSIS — Z79.4 TYPE 2 DIABETES MELLITUS WITH DIABETIC NEUROPATHY, WITH LONG-TERM CURRENT USE OF INSULIN: ICD-10-CM

## 2023-11-18 NOTE — TELEPHONE ENCOUNTER
No care due was identified.  [facility] Embedded Care Due Messages. Reference number: [id number].   11/18/2023 9:16:37 AM CST

## 2023-11-19 NOTE — TELEPHONE ENCOUNTER
Refill Routing Note   Medication(s) are not appropriate for processing by [facility] for the following reason(s):        ED/Hospital Visit since last OV with provider    [facility] action(s):  Route               Appointments  past 12m or future 3m with PCP    Date Provider   Last Visit   5/10/2023 Graeme Mena MD   Next Visit   1/11/2024 Graeme Mena MD   ED visits in past 90 days: 1        Note composed:11:01 PM 11/18/2023

## 2023-11-20 RX ORDER — BLOOD-GLUCOSE METER
EACH MISCELLANEOUS
Qty: 400 STRIP | Refills: 3 | Status: SHIPPED | OUTPATIENT
Start: 2023-11-20

## 2023-12-20 DIAGNOSIS — E11.9 TYPE 2 DIABETES MELLITUS WITHOUT COMPLICATION, UNSPECIFIED WHETHER LONG TERM INSULIN USE: ICD-10-CM

## 2024-01-04 DIAGNOSIS — E11.40 TYPE 2 DIABETES MELLITUS WITH DIABETIC NEUROPATHY, WITH LONG-TERM CURRENT USE OF INSULIN: ICD-10-CM

## 2024-01-04 DIAGNOSIS — Z79.4 TYPE 2 DIABETES MELLITUS WITH DIABETIC NEUROPATHY, WITH LONG-TERM CURRENT USE OF INSULIN: ICD-10-CM

## 2024-01-04 RX ORDER — LANCETS 33 GAUGE
EACH MISCELLANEOUS
Qty: 100 EACH | Refills: 1 | Status: SHIPPED | OUTPATIENT
Start: 2024-01-04

## 2024-01-04 NOTE — TELEPHONE ENCOUNTER
Care Due:                  Date            Visit Type   Department     Provider  --------------------------------------------------------------------------------                                NP -                              PRIMARY      [facility] FAMILY  Last Visit: 05-      CARE ([facility])   MEDICINE       [facility] FAMILY  Next Visit: 01-      FOLLOW UP    MEDICINE       [facility]                                                            Last  Test          Frequency    Reason                     Performed    Due Date  --------------------------------------------------------------------------------    HBA1C.......  6 months...  insulin, metFORMIN.......  05- 11-    [facility] Embedded Care Due Messages. Reference number: [id number].   1/04/2024 8:28:10 AM CST

## 2024-01-10 ENCOUNTER — TELEPHONE (OUTPATIENT)
Dept: FAMILY MEDICINE | Facility: CLINIC | Age: 64
End: 2024-01-10
Payer: MEDICARE

## 2024-01-10 NOTE — TELEPHONE ENCOUNTER
Spoke to pt sister. She wanted to [place] tomorrow appt. I offered her a different day she declined and will keep tomorrow appointment.

## 2024-01-10 NOTE — TELEPHONE ENCOUNTER
----- Message from Gamaliel Palm sent at 1/10/2024  2:02 PM CST -----  Contact: SIster  Type:  Sooner Appointment Request    Caller is requesting a sooner appointment.  Caller declined first available appointment listed below.  Caller will not accept being placed on the waitlist and is requesting a message be sent to doctor.    Name of Caller: Lynette Sibling  When is the first available appointment?  N/a  Symptoms:  hosp f/u  Would the patient rather a call back or a response via [facility]? Call back  Best Call Back Number:  [phone]   Additional Information:  Sister called to r/s appt  Please advise  Thanks

## 2024-01-11 ENCOUNTER — OFFICE VISIT (OUTPATIENT)
Dept: FAMILY MEDICINE | Facility: CLINIC | Age: 64
End: 2024-01-11
Payer: MEDICARE

## 2024-01-11 ENCOUNTER — LAB VISIT (OUTPATIENT)
Dept: LAB | Facility: HOSPITAL | Age: 64
End: 2024-01-11
Attending: STUDENT IN AN ORGANIZED HEALTH CARE EDUCATION/TRAINING PROGRAM
Payer: MEDICARE

## 2024-01-11 VITALS
DIASTOLIC BLOOD PRESSURE: 78 MMHG | HEART RATE: 79 BPM | WEIGHT: 182.56 LBS | HEIGHT: 64 IN | BODY MASS INDEX: 31.17 KG/M2 | SYSTOLIC BLOOD PRESSURE: 144 MMHG | OXYGEN SATURATION: 97 %

## 2024-01-11 DIAGNOSIS — I10 PRIMARY HYPERTENSION: ICD-10-CM

## 2024-01-11 DIAGNOSIS — E11.40 TYPE 2 DIABETES MELLITUS WITH DIABETIC NEUROPATHY, WITH LONG-TERM CURRENT USE OF INSULIN: ICD-10-CM

## 2024-01-11 DIAGNOSIS — N18.32 STAGE 3B CHRONIC KIDNEY DISEASE: Primary | ICD-10-CM

## 2024-01-11 DIAGNOSIS — Z79.4 TYPE 2 DIABETES MELLITUS WITH DIABETIC NEUROPATHY, WITH LONG-TERM CURRENT USE OF INSULIN: ICD-10-CM

## 2024-01-11 DIAGNOSIS — N18.32 STAGE 3B CHRONIC KIDNEY DISEASE: ICD-10-CM

## 2024-01-11 DIAGNOSIS — R10.13 DYSPEPSIA: ICD-10-CM

## 2024-01-11 DIAGNOSIS — N32.0 BLADDER OUTLET OBSTRUCTION: ICD-10-CM

## 2024-01-11 LAB
ALBUMIN SERPL BCP-MCNC: 3.8 G/DL (ref 3.5–5.2)
ALBUMIN/CREAT UR: NORMAL UG/MG (ref 0–30)
ALP SERPL-CCNC: 103 U/L (ref 55–135)
ALT SERPL W/O P-5'-P-CCNC: 30 U/L (ref 10–44)
ANION GAP SERPL CALC-SCNC: 7 MMOL/L (ref 8–16)
AST SERPL-CCNC: 22 U/L (ref 10–40)
BILIRUB SERPL-MCNC: 0.3 MG/DL (ref 0.1–1)
BUN SERPL-MCNC: 23 MG/DL (ref 8–23)
CALCIUM SERPL-MCNC: 9 MG/DL (ref 8.7–10.5)
CHLORIDE SERPL-SCNC: 104 MMOL/L (ref 95–110)
CO2 SERPL-SCNC: 25 MMOL/L (ref 23–29)
CREAT SERPL-MCNC: 1.8 MG/DL (ref 0.5–1.4)
CREAT UR-MCNC: 51 MG/DL (ref 23–375)
EST. GFR  (NO RACE VARIABLE): 42 ML/MIN/1.73 M^2
ESTIMATED AVG GLUCOSE: 169 MG/DL (ref 68–131)
GLUCOSE SERPL-MCNC: 273 MG/DL (ref 70–110)
HBA1C MFR BLD: 7.5 % (ref 4–5.6)
MICROALBUMIN UR DL<=1MG/L-MCNC: <5 UG/ML
POTASSIUM SERPL-SCNC: 5.4 MMOL/L (ref 3.5–5.1)
PROT SERPL-MCNC: 7.3 G/DL (ref 6–8.4)
SODIUM SERPL-SCNC: 136 MMOL/L (ref 136–145)

## 2024-01-11 PROCEDURE — 99999 PR PBB SHADOW E&M-EST. PATIENT-LVL III: CPT | Mod: PBBFAC,,, | Performed by: STUDENT IN AN ORGANIZED HEALTH CARE EDUCATION/TRAINING PROGRAM

## 2024-01-11 PROCEDURE — 99214 OFFICE O/P EST MOD 30 MIN: CPT | Mod: S$GLB,,, | Performed by: STUDENT IN AN ORGANIZED HEALTH CARE EDUCATION/TRAINING PROGRAM

## 2024-01-11 PROCEDURE — 82043 UR ALBUMIN QUANTITATIVE: CPT | Performed by: STUDENT IN AN ORGANIZED HEALTH CARE EDUCATION/TRAINING PROGRAM

## 2024-01-11 PROCEDURE — 83036 HEMOGLOBIN GLYCOSYLATED A1C: CPT | Performed by: STUDENT IN AN ORGANIZED HEALTH CARE EDUCATION/TRAINING PROGRAM

## 2024-01-11 PROCEDURE — 36415 COLL VENOUS BLD VENIPUNCTURE: CPT | Mod: PO | Performed by: STUDENT IN AN ORGANIZED HEALTH CARE EDUCATION/TRAINING PROGRAM

## 2024-01-11 PROCEDURE — 80053 COMPREHEN METABOLIC PANEL: CPT | Performed by: STUDENT IN AN ORGANIZED HEALTH CARE EDUCATION/TRAINING PROGRAM

## 2024-01-11 RX ORDER — CLONIDINE HYDROCHLORIDE 0.1 MG/1
0.1 TABLET ORAL 2 TIMES DAILY
Qty: 60 TABLET | Refills: 11 | Status: SHIPPED | OUTPATIENT
Start: 2024-01-11

## 2024-01-11 RX ORDER — FUROSEMIDE 20 MG/1
20 TABLET ORAL 2 TIMES DAILY
Qty: 60 TABLET | Refills: 11 | Status: SHIPPED | OUTPATIENT
Start: 2024-01-11

## 2024-01-11 RX ORDER — BUSPIRONE HYDROCHLORIDE 10 MG/1
10 TABLET ORAL 3 TIMES DAILY
COMMUNITY

## 2024-01-11 RX ORDER — GABAPENTIN 100 MG/1
100 CAPSULE ORAL 3 TIMES DAILY
Qty: 90 CAPSULE | Refills: 11 | Status: SHIPPED | OUTPATIENT
Start: 2024-01-11

## 2024-01-11 RX ORDER — CLONIDINE HYDROCHLORIDE 0.1 MG/1
0.1 TABLET ORAL 2 TIMES DAILY
COMMUNITY
End: 2024-01-11 | Stop reason: SDUPTHER

## 2024-01-11 RX ORDER — INSULIN ASPART 100 [IU]/ML
INJECTION, SOLUTION INTRAVENOUS; SUBCUTANEOUS
Qty: 15 ML | Refills: 2 | Status: SHIPPED | OUTPATIENT
Start: 2024-01-11

## 2024-01-11 RX ORDER — FUROSEMIDE 20 MG/1
20 TABLET ORAL 2 TIMES DAILY
COMMUNITY
End: 2024-01-11 | Stop reason: SDUPTHER

## 2024-01-11 RX ORDER — PANTOPRAZOLE SODIUM 40 MG/1
40 TABLET, DELAYED RELEASE ORAL DAILY
Qty: 14 TABLET | Refills: 0 | Status: SHIPPED | OUTPATIENT
Start: 2024-01-11 | End: 2024-01-25

## 2024-01-11 RX ORDER — FLUOXETINE HYDROCHLORIDE 40 MG/1
40 CAPSULE ORAL DAILY
COMMUNITY

## 2024-01-11 RX ORDER — TRAZODONE HYDROCHLORIDE 300 MG/1
300 TABLET ORAL NIGHTLY
COMMUNITY

## 2024-01-11 NOTE — ASSESSMENT & PLAN NOTE
Patient states that he had switched his sliding scale insulin after his most recent hospitalization. He says his home sugars are overall better. However, he also started taking evening short-acting insulin and has woken up with low sugars, one as low as 50. Continue current sliding scale but stop the evening dose of short-acting insulin.

## 2024-01-11 NOTE — PROGRESS NOTES
Name: Matthieu Jovel  MRN: [id number]  : 1960  PCP: Graeme Mena MD    Subjective   Patient presents for hospital follow up. I do not have access to records and he did not present paperwork. He says he was hospitalized for UTI requiring antibiotics. Has a history of urinary retention requiring self-cath. No issues regarding such at this time.     Review of Systems   Cardiovascular:  Negative for palpitations and leg swelling.   Gastrointestinal:  Positive for abdominal pain (epigastric) and nausea.   Neurological:  Negative for dizziness.       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 mechanisms    Anemia, chronic disease    Anemia, chronic renal failure, stage 3 (moderate)    Schizophrenia    Psychogenic nonepileptic seizure    Bladder outlet obstruction       Health Maintenance Due   Topic Date Due    Low Dose Statin  Never done    Eye Exam  2018    Shingles Vaccine (2 of 2) 2019    RSV Vaccine (Age 60+ and Pregnant patients) (1 - 1-dose 60+ series) Never done    Pneumococcal Vaccines (Age 0-64) (2 - PCV) 2022    Diabetes Urine Screening  06/15/2022    Hemoglobin A1c  08/10/2023    Influenza Vaccine (1) 2023    COVID-19 Vaccine (4 - - season) 2023    Foot Exam  2023       Objective   Vitals:    24 1029   BP: (!) 144/78   Pulse: 79       Physical Exam  Constitutional:       General: He is not in acute distress.     Appearance: Normal appearance. He is well-developed.   HENT:      Head: Normocephalic and atraumatic.      Right Ear: External ear normal.      Left Ear: External ear normal.   Eyes:      Conjunctiva/sclera: Conjunctivae normal.   Pulmonary:      Effort:  "Pulmonary effort is normal. No respiratory distress.   Abdominal:      General: Abdomen is flat. There is no distension.   Musculoskeletal:         General: No swelling or deformity.      Right lower leg: No edema.      Left lower leg: No edema.   Skin:     General: Skin is warm and dry.      Coloration: Skin is not jaundiced.   Neurological:      Mental Status: He is alert and oriented to person, place, and time. Mental status is at baseline.   Psychiatric:         Attention and Perception: Attention and perception normal.         Mood and Affect: Mood normal.         Speech: Speech normal.         Behavior: Behavior normal. Behavior is cooperative.         Thought Content: Thought content normal.         Cognition and Memory: Cognition normal.         Judgment: Judgment normal.         Assessment & Plan   1. Stage 3b chronic kidney disease  -     Comprehensive Metabolic Panel; Future; Expected date: 01/11/2024    2. Type 2 diabetes mellitus with diabetic neuropathy, with long-term current use of insulin  Assessment & Plan:  Patient states that he had switched his sliding scale insulin after his most recent hospitalization. He says his home sugars are overall better. However, he also started taking evening short-acting insulin and has woken up with low sugars, one as low as 50. Continue current sliding scale but stop the evening dose of short-acting insulin.    Orders:  -     gabapentin (NEURONTIN) 100 MG capsule; Take 1 capsule (100 mg total) by mouth 3 (three) times daily.  Dispense: 90 capsule; Refill: 11  -     Hemoglobin A1C; Future  -     Microalbumin/Creatinine Ratio, Urine  -     Comprehensive Metabolic Panel; Future; Expected date: 01/11/2024    3. Bladder outlet obstruction  Assessment & Plan:  Patient seems to have recovered from UTI. However, he states he no longer does the self-cath because he's "allergic" to it. Hospital provided him with furosemide prescription to "help me pee." We can continue this " to help with blood pressure management. I will need hospital records at some point to figure out what happened with his self-cath and why.      4. Primary hypertension  -     furosemide (LASIX) 20 MG tablet; Take 1 tablet (20 mg total) by mouth 2 (two) times daily.  Dispense: 60 tablet; Refill: 11  -     cloNIDine (CATAPRES) 0.1 MG tablet; Take 1 tablet (0.1 mg total) by mouth 2 (two) times daily.  Dispense: 60 tablet; Refill: 11    5. Dyspepsia  -     pantoprazole (PROTONIX) 40 MG tablet; Take 1 tablet (40 mg total) by mouth once daily. for 14 days  Dispense: 14 tablet; Refill: 0        Follow up in 4 months. I spent 31 minutes pre-charting, interviewing patient, performing exam, formulating and discussing plan, placing orders, and documenting.     Graeme Mena MD  01/11/2024

## 2024-01-11 NOTE — TELEPHONE ENCOUNTER
No care due was identified.  [facility] Embedded Care Due Messages. Reference number: [id number].   1/11/2024 8:47:36 AM CST

## 2024-01-11 NOTE — ASSESSMENT & PLAN NOTE
"Patient seems to have recovered from UTI. However, he states he no longer does the self-cath because he's "allergic" to it. Hospital provided him with furosemide prescription to "help me pee." We can continue this to help with blood pressure management. I will need hospital records at some point to figure out what happened with his self-cath and why.  "

## 2024-03-20 DIAGNOSIS — Z79.4 TYPE 2 DIABETES MELLITUS WITH DIABETIC NEUROPATHY, WITH LONG-TERM CURRENT USE OF INSULIN: ICD-10-CM

## 2024-03-20 DIAGNOSIS — R10.13 DYSPEPSIA: ICD-10-CM

## 2024-03-20 DIAGNOSIS — E11.40 TYPE 2 DIABETES MELLITUS WITH DIABETIC NEUROPATHY, WITH LONG-TERM CURRENT USE OF INSULIN: ICD-10-CM

## 2024-03-20 RX ORDER — LANCETS 33 GAUGE
EACH MISCELLANEOUS
Qty: 400 EACH | Refills: 3 | Status: SHIPPED | OUTPATIENT
Start: 2024-03-20

## 2024-03-20 NOTE — TELEPHONE ENCOUNTER
No care due was identified.  Health [facility] Embedded Care Due Messages. Reference number: [id number].   3/20/2024 4:02:31 PM CDT

## 2024-03-20 NOTE — TELEPHONE ENCOUNTER
Refill Routing Note   Medication(s) are not appropriate for processing by [facility] for the following reason(s):        New or recently adjusted medication    [facility] action(s):  Defer  Approve               Appointments  past 12m or future 3m with PCP    Date Provider   Last Visit   1/11/2024 Graeme Mena MD   Next Visit   5/10/2024 Graeme Mena MD   ED visits in past 90 days: 0        Note composed:4:22 PM 03/20/2024

## 2024-03-20 NOTE — TELEPHONE ENCOUNTER
----- Message from Mell Chaudhary sent at 3/20/2024  3:58 PM CDT -----  Contact: Pete from [facility]  Type:  Pharmacy Calling to Clarify an RX    Name of Caller: Pete from [facility]    Pharmacy Name:      [facility] - [place] - [address] [address], [address]  [address] [address], [address]  [facility] [address]  Phone: [phone] Fax: [phone]      Prescription Name:   Insulin medications    What do they need to clarify?:  Which medications and needs prescriptions    Best Call Back Number:   [phone] - Pete    Additional Information:   States she needs to speak with someone to verify which insulin medications and needs prescriptions - please call - thank you

## 2024-03-21 RX ORDER — PANTOPRAZOLE SODIUM 40 MG/1
40 TABLET, DELAYED RELEASE ORAL DAILY
Qty: 90 TABLET | Refills: 3 | Status: SHIPPED | OUTPATIENT
Start: 2024-03-21

## 2024-04-01 NOTE — TELEPHONE ENCOUNTER
[facility] OFFICE VISIT    CHIEF COMPLAINT  Cough and Sinus Problem      Subjective     Have you been in close contact with a person known to have a positive test for COVID-19? No    Have you or a household member tested positive for COVID-19 or are waiting for COVID-19 test results?  No    Have you traveled outside of the state in the last 14 days?  No    HISTORY OF PRESENT ILLNESS  Jed Gonzales is a 56 year old male who presented  requesting evaluation for cough and sinus pain/pressure. Symptoms started 4 nights ago with cough and sinus congestion. Has progressed to minimally productive cough with chest congestion and post-nasal drip, bilateral frontal sinus pressure, some nausea with one emesis, increased body aches including scalp pain with bouts of coughing, and fatigue. Has felt lightheaded a couple times after long periods of coughing but has used his albuterol with some improvement of those symptoms. Has not noticed any wheezing recently or feeling short of breath. Reports history of \"frequent\" sinus issues, Denies Seasonal and/or Environmental Allergies, and Denies history of asthma. Denies: chest pain, palpitations, numbness/ tingling in extremities, or dyspnea. For symptom relief has tried OTC (over-the-counter): Cough drops,  as needed which has provided little to no relief. Jed also is taking Airborne. Is keeping well hydrated. No other concerns today.     Current Patient Symptoms:    Symptom Response   Fever ?   No   Cough?   Yes   Shortness of breath?  No   Sore Throat?  No   Body aches?  Yes   Chills?  Yes   Headache?  Yes   Loss of smell or taste?  No   Nausea?   No   Vomiting?  No   Diarrhea?  No   Runny nose?  No   Nasal congestion?  Yes   Sinus pressure?  Yes   Post nasal drainage?   Yes   Ear problem?  No     MEDICATIONS  Current Outpatient Medications   Medication Sig    aspirin (ECOTRIN) 81 MG EC tablet Take 162 mg by mouth daily.    fluticasone (FLONASE) 50 MCG/ACT nasal spray Two  Encounter opened to request a pa in epic.    sprays each nostril daily for seven days, then one spray each nostril daily.    albuterol 108 (90 Base) MCG/ACT inhaler Inhale 2 puffs into the lungs every 4 hours as needed for Wheezing.     No current facility-administered medications for this visit.        ALLERGIES  ALLERGIES:   Allergen Reactions    Dust Mite Extract PRURITUS and Runny Nose    Pollen PRURITUS and Runny Nose    Pollen Extract PRURITUS and Runny Nose       PAST MEDICAL, FAMILY AND SOCIAL HISTORY  Past Medical History:  Past Medical History:   Diagnosis Date    Anesthesia complication     Prolonged awakening    BPH (benign prostatic hyperplasia)     Closed displaced fracture of fifth metatarsal bone of right foot 04/18/2016    Dyspnea     Hip pain     Hypertension 04/03/2018    Left knee DJD     Multiple fractures of foot, right, with routine healing, subsequent encounter 04/18/2016    Osteoarthritis of left knee 08/21/2017    Other sinusitis, unspecified chronicity     Unspecified asthma, uncomplicated     Work related injury 05/11/2016       Surgeries:  Past Surgical History:   Procedure Laterality Date    Orif foot fracture Right 04/12/2016    MID FOOT FRACTURE & 5TH METATARSAL FRACTURE- Liban Lord DPM; Location: [facility]    Partial knee arthroplasty Left 10/31/2017    UNICONDYLAR ARTHROPLASTY- Ritesh Sal MD; Location: [facility] OR       Family History:  Family History   Problem Relation Age of Onset    Diabetes Mother     Alcohol Abuse Father     Osteoarthritis Father     Osteoarthritis Sister        Social History:  Social History     Tobacco Use    Smoking status: Never     Passive exposure: Never    Smokeless tobacco: Never   Vaping Use    Vaping Use: never used         REVIEW OF SYSTEMS  A review of systems was negative except for those mentioned in the history of present illness.     Objective   Visit Vitals  BP (!) 144/86 (BP Location: RUE - Right upper extremity, Patient Position: Sitting, Cuff Size: Large Adult)   Pulse (!) 110    Temp 98.2 °F (36.8 °C) (Skin)   Resp 18   SpO2 96%     Vitals:    04/01/24 0831   PainSc:  0      PHYSICAL EXAM  General: Jed appears stated age, is in no apparent distress, is well developed and well nourished, and alert, cooperative, normal affect, and no apparent pain  Lymphatic:  Submandibular lymphadenopathy  Head: Normocephalic-atraumatic with facial symmetry.   Eyes:  Conjunctivae/sclerae normal. No erythema, edema or exudate. PERRL   EOMI    Nose:  nares congested and nares with clear discharge  The nasal sinuses are normal.  The nasal septum is midline.   Mouth/ Throat:   The soft palate is symmetrical without lesion.  The oral mucosa and gingiva are normal.   The tongue is midline and appears normal.  The uvula is midline without edema or erythema.Positive findings: mild oropharyngeal erythema, tonsillar hypertrophy 1+, posterior pharyngeal drainage.    Ears: The bilateral external auditory canal(s) is/are normal. External ears normal to inspection & palpation. Turgor normal. Canals clear. Tympanic membranes clear with all landmarks noted.   Heart: regular rate and rhythm and normal S1 and S2  Lungs:  Bilaterally clear to auscultation   Non-labored respirations.  Normal respiratory effort.   Neuro:  Alert, oriented x4.  Speech intact.  No dysphasia or dysarthria.  Symmetrical facial structures.  Strength 5/5 all extremities.  Sensation intact to light touch.   CN 2-12 are grossly intact.  Gait: normal    Results for orders placed or performed in visit on 04/01/24   POCT SARS-COV-2 ANTIGEN/FLU ANTIGEN PANEL   Result Value    POCT SARS-COV-2 ANTIGEN Not Detected    Rapid Influenza A Ag Negative    Rapid Influenza B Ag Positive (A)       Assessment & Plan   Jed was seen today for cough and sinus problem.    Diagnoses and all orders for this visit:    Influenza B  -     benzonatate (TESSALON PERLES) 200 MG capsule; Take 1 capsule by mouth 3 times daily as needed for Cough.    Suspected COVID-19 virus  infection  -     POCT SARS-COV-2 ANTIGEN/FLU ANTIGEN PANEL    Influenza-like symptoms  -     POCT SARS-COV-2 ANTIGEN/FLU ANTIGEN PANEL    Elevated blood-pressure reading without diagnosis of hypertension        Discussed concerns about COVID-19 and Influenza, in-office testing completed. Patient is: negative for COVID-19 and Influenza A, but positive for Influenza B.  Start/ continue multivitamin containing vitamin D, vitamin C and Zinc to help boost the immune system. Start benzonatate up to 3 times daily for cough, especially use at night to aid with sleep. Start cetirizine (Zyrtec) or loratadine (Claritin) 1 tablet daily. Start Mucinex Maximum Strength (guaifenesin ER) twice daily with at least 64 ounces of non-carbonated, decaffeinated, non-alcoholic fluids. Take in morning with breakfast and with dinner but at least 3-4 hours prior to bedtime to prevent keeping you up at night. Do not cut, crush or chew maximum strength tablets. New medication instructions and potential adverse reactions were discussed. Reviewed home care for upper respiratory tract infection symptoms such as vaporizer at bedside, Tylenol and/or Ibuprofen for fevers and/or pain, and plenty of rest and fluids. Reviewed need to remain home and away from others until at least 24 hours after both patient's symptoms are getting better overall, and patient has not had a fever (and are not using fever-reducing medication). Reviewed elevated blood pressure at last 2 visits; however both ill visits. Encouraged to watch diet for salt intake, increase activity level when feeling better, and return for repeat blood pressure check when feeling better. All questions and concerns were answered. Patient given education materials. If symptoms worsen or fail to improve follow up with this clinic, Urgent Care/ ER or primary care provider. Advised follow up in about 1 month for blood pressure. The patient indicated understanding of the diagnosis and agreed with  the plan of care.    I spent a total of 30 minutes on the day of the visit.  This includes chart review and documenting.    LÁZARO Holt    The 21st Century Cures Act makes medical notes like these available to patients in the interest of transparency. However, be advised this is a medical document. It is intended as peer to peer communication. It is written in medical language and may contain abbreviations or verbiage that are unfamiliar. It may appear blunt or direct. Medical documents are intended to carry relevant information, facts as evident, and the clinical opinion of the practitioner.

## 2024-04-02 ENCOUNTER — TELEPHONE (OUTPATIENT)
Dept: FAMILY MEDICINE | Facility: CLINIC | Age: 64
End: 2024-04-02

## 2024-04-02 NOTE — TELEPHONE ENCOUNTER
Spoke to Giovanna STANLEY. His Blood Sugar was 535. Pt is asymptomatic. He took his insulin and she is calling him in a little bit to follow up on his glucose reading. Giovanna stated pt had a very sugary cereal and pt told her he will eat better and drink water.

## 2024-04-02 NOTE — TELEPHONE ENCOUNTER
----- Message from Des Vanessa sent at 4/2/2024  2:24 PM CDT -----  Regarding: Pt Advice/High Blood Sugar  Contact: [facility]  Needs Medical Advice      Who Called:  Giovanna         Would the patient rather a call back or a response via [facility]? Call back    Best Call Back Number: [phone]    Additional Information: Sts the pts blood sugar a few minutes ago was 535 and he is taking his insulin as told by office. Sts is wanting to report it to the office and receive a call back to see what to do.   Please Advise - Thank you

## 2024-04-25 ENCOUNTER — PATIENT OUTREACH (OUTPATIENT)
Dept: ADMINISTRATIVE | Facility: HOSPITAL | Age: 64
End: 2024-04-25
Payer: MEDICARE

## 2024-04-25 NOTE — PROGRESS NOTES
Population Health Chart Review & Patient Outreach Details      Additional [facility] Health Notes:               Updates Requested / Reviewed:               Health Maintenance Topics Overdue:      VBHM Score: 3     Eye Exam  Foot Exam  Uncontrolled BP    Influenza Vaccine  Pneumonia Vaccine  Shingles/Zoster Vaccine  RSV Vaccine                  Health Maintenance Topic(s) Outreach Outcomes & Actions Taken:    Colorectal Cancer Screening - Outreach Outcomes & Actions Taken  : Reminded Patient to Complete Already Received Home Test

## 2024-04-25 NOTE — PROGRESS NOTES
Population Health Chart Review & Patient Outreach Details      Additional [facility] Health Notes:               Updates Requested / Reviewed:      Immunizations Reconciliation Completed or Queried: [place]         Health Maintenance Topics Overdue:      [facility] Score: 3     Eye Exam  Foot Exam  Uncontrolled BP    Influenza Vaccine  Pneumonia Vaccine  Shingles/Zoster Vaccine  RSV Vaccine                  Health Maintenance Topic(s) Outreach Outcomes & Actions Taken:    Eye Exam - Outreach Outcomes & Actions Taken  : OUTREACHED TO SCHEDULE

## 2024-05-07 DIAGNOSIS — E11.40 TYPE 2 DIABETES MELLITUS WITH DIABETIC NEUROPATHY, WITH LONG-TERM CURRENT USE OF INSULIN: ICD-10-CM

## 2024-05-07 DIAGNOSIS — Z79.4 TYPE 2 DIABETES MELLITUS WITH DIABETIC NEUROPATHY, WITH LONG-TERM CURRENT USE OF INSULIN: ICD-10-CM

## 2024-05-07 RX ORDER — INSULIN ASPART 100 [IU]/ML
INJECTION, SOLUTION INTRAVENOUS; SUBCUTANEOUS
Qty: 15 ML | Refills: 2 | Status: SHIPPED | OUTPATIENT
Start: 2024-05-07 | End: 2024-06-13

## 2024-05-07 NOTE — TELEPHONE ENCOUNTER
Refill Routing Note   Medication(s) are not appropriate for processing by [facility] for the following reason(s):        Outside of protocol: sliding scale insulin    [facility] action(s):  Route     Requires labs : Yes    Medication Therapy Plan:  Lab (a1c) due 7.10.2024      Appointments  past 12m or future 3m with PCP    Date Provider   Last Visit   1/11/2024 Graeme Mena MD   Next Visit   5/10/2024 Graeme Mena MD   ED visits in past 90 days: 0        Note composed:12:06 PM 05/07/2024

## 2024-05-15 DIAGNOSIS — E11.9 TYPE 2 DIABETES MELLITUS WITHOUT COMPLICATION: ICD-10-CM

## 2024-06-10 ENCOUNTER — PATIENT MESSAGE (OUTPATIENT)
Dept: ADMINISTRATIVE | Facility: HOSPITAL | Age: 64
End: 2024-06-10
Payer: MEDICARE

## 2024-06-13 ENCOUNTER — TELEPHONE (OUTPATIENT)
Dept: FAMILY MEDICINE | Facility: CLINIC | Age: 64
End: 2024-06-13
Payer: MEDICARE

## 2024-06-13 DIAGNOSIS — Z79.4 TYPE 2 DIABETES MELLITUS WITH DIABETIC NEUROPATHY, WITH LONG-TERM CURRENT USE OF INSULIN: Primary | ICD-10-CM

## 2024-06-13 DIAGNOSIS — E11.40 TYPE 2 DIABETES MELLITUS WITH DIABETIC NEUROPATHY, WITH LONG-TERM CURRENT USE OF INSULIN: Primary | ICD-10-CM

## 2024-06-13 RX ORDER — INSULIN LISPRO 100 [IU]/ML
INJECTION, SOLUTION INTRAVENOUS; SUBCUTANEOUS
Qty: 15 ML | Refills: 11 | Status: SHIPPED | OUTPATIENT
Start: 2024-06-13

## 2024-06-13 NOTE — TELEPHONE ENCOUNTER
PA initiated for Novolog. Insurer is inquiring if patient can take one of their formulary meds and if not to justify why the patient can not take the formulary medication.     Form from the insurer with the listed formulary drugs in provider's review basket. If patient can not take the formulary drugs please write a justification why patient has to have the Novolog instead of the formulary meds on the form provider. Thank you.

## 2024-06-13 NOTE — TELEPHONE ENCOUNTER
----- Message from Lilliana Woo MA sent at 6/13/2024 10:20 AM CDT -----  Contact: Peet  Can you please assist?  ----- Message -----  From: Luis Eduardo Gomes  Sent: 6/13/2024   9:54 AM CDT  To: Trina Bedolla Staff    Type:  Pharmacy Calling to Clarify an RX    Name of Caller:  Pete  Pharmacy Name:  [facility]  Prescription Name:  insulin aspart U-100 (NOVOLOG) 100 unit/mL (3 mL) InPn pen  What do they need to clarify?:  Needs prior authorization  Best Call Back Number:  [phone]

## 2024-06-19 ENCOUNTER — DOCUMENTATION ONLY (OUTPATIENT)
Dept: FAMILY MEDICINE | Facility: CLINIC | Age: 64
End: 2024-06-19
Payer: MEDICARE

## 2024-06-27 DIAGNOSIS — Z79.4 TYPE 2 DIABETES MELLITUS WITH DIABETIC NEUROPATHY, WITH LONG-TERM CURRENT USE OF INSULIN: ICD-10-CM

## 2024-06-27 DIAGNOSIS — E11.40 TYPE 2 DIABETES MELLITUS WITH DIABETIC NEUROPATHY, WITH LONG-TERM CURRENT USE OF INSULIN: ICD-10-CM

## 2024-06-27 RX ORDER — BLOOD-GLUCOSE METER
EACH MISCELLANEOUS
Qty: 400 STRIP | Refills: 3 | Status: SHIPPED | OUTPATIENT
Start: 2024-06-27

## 2024-06-27 NOTE — TELEPHONE ENCOUNTER
Refill Decision Note   Matthieu Jovel  is requesting a refill authorization.  Brief Assessment and Rationale for Refill:  Approve     Medication Therapy Plan:         Comments:     Note composed:9:10 AM 06/27/2024

## 2024-06-27 NOTE — TELEPHONE ENCOUNTER
No care due was identified.  [facility] Embedded Care Due Messages. Reference number: [id number].   6/27/2024 9:08:01 AM CDT

## 2024-06-28 ENCOUNTER — PATIENT MESSAGE (OUTPATIENT)
Dept: ADMINISTRATIVE | Facility: HOSPITAL | Age: 64
End: 2024-06-28
Payer: MEDICARE

## 2024-07-01 RX ORDER — LANCETS 30 GAUGE
EACH MISCELLANEOUS
Qty: 1 EACH | Refills: 0 | Status: SHIPPED | OUTPATIENT
Start: 2024-07-01

## 2024-07-01 NOTE — TELEPHONE ENCOUNTER
Refill Routing Note   Medication(s) are not appropriate for processing by [facility] for the following reason(s):        No active prescription written by provider    [facility] action(s):  Defer             Appointments  past 12m or future 3m with PCP    Date Provider   Last Visit   1/11/2024 Graeme Mena MD   Next Visit   Visit date not found Graeme Mena MD   ED visits in past 90 days: 0        Note composed:2:42 PM 07/01/2024

## 2024-07-01 NOTE — TELEPHONE ENCOUNTER
No care due was identified.  [facility] Embedded Care Due Messages. Reference number: [id number].   7/01/2024 1:58:34 PM CDT

## 2024-07-22 DIAGNOSIS — Z79.4 TYPE 2 DIABETES MELLITUS WITH DIABETIC NEUROPATHY, WITH LONG-TERM CURRENT USE OF INSULIN: Primary | ICD-10-CM

## 2024-07-22 DIAGNOSIS — E11.40 TYPE 2 DIABETES MELLITUS WITH DIABETIC NEUROPATHY, WITH LONG-TERM CURRENT USE OF INSULIN: Primary | ICD-10-CM

## 2024-07-22 RX ORDER — LANCETS 30 GAUGE
EACH MISCELLANEOUS
Qty: 1 EACH | Refills: 0 | Status: SHIPPED | OUTPATIENT
Start: 2024-07-22

## 2024-07-22 NOTE — TELEPHONE ENCOUNTER
Care Due:                  Date            Visit Type   Department     Provider  --------------------------------------------------------------------------------                                [facility]ospitals [facility]  Last Visit: 01-      FOLLOW UP    MEDICINE       Graeme Mena  Next Visit: None Scheduled  None         None Found                                                            Last  Test          Frequency    Reason                     Performed    Due Date  --------------------------------------------------------------------------------    HBA1C.......  6 months...  insulin, metFORMIN.......  01-   07-    [facility] Embedded Care Due Messages. Reference number: [id number].   7/22/2024 9:58:31 AM CDT

## 2024-07-23 NOTE — TELEPHONE ENCOUNTER
Refill Decision Note   Matthieu Jovel  is requesting a refill authorization.  Brief Assessment and Rationale for Refill:  Approve     Medication Therapy Plan:         Comments:     Note composed:7:16 PM 07/22/2024

## 2024-08-14 ENCOUNTER — PATIENT MESSAGE (OUTPATIENT)
Dept: ADMINISTRATIVE | Facility: HOSPITAL | Age: 64
End: 2024-08-14
Payer: MEDICARE

## 2024-08-27 ENCOUNTER — PATIENT MESSAGE (OUTPATIENT)
Dept: FAMILY MEDICINE | Facility: CLINIC | Age: 64
End: 2024-08-27
Payer: MEDICARE

## 2024-09-10 ENCOUNTER — PATIENT MESSAGE (OUTPATIENT)
Dept: ADMINISTRATIVE | Facility: HOSPITAL | Age: 64
End: 2024-09-10
Payer: MEDICARE

## 2024-09-19 ENCOUNTER — PATIENT OUTREACH (OUTPATIENT)
Dept: ADMINISTRATIVE | Facility: HOSPITAL | Age: 64
End: 2024-09-19
Payer: MEDICARE

## 2024-09-25 ENCOUNTER — LAB VISIT (OUTPATIENT)
Dept: LAB | Facility: HOSPITAL | Age: 64
End: 2024-09-25
Attending: NURSE PRACTITIONER
Payer: MEDICARE

## 2024-09-25 DIAGNOSIS — F31.9 BIPOLAR DISORDER: ICD-10-CM

## 2024-09-25 DIAGNOSIS — Z79.899 ENCOUNTER FOR LONG-TERM (CURRENT) USE OF MEDICATIONS: ICD-10-CM

## 2024-09-25 LAB — VALPROATE SERPL-MCNC: 96.6 UG/ML (ref 50–100)

## 2024-09-25 PROCEDURE — 80164 ASSAY DIPROPYLACETIC ACD TOT: CPT | Performed by: NURSE PRACTITIONER

## 2024-09-25 PROCEDURE — 36415 COLL VENOUS BLD VENIPUNCTURE: CPT | Mod: PO | Performed by: NURSE PRACTITIONER

## 2024-10-09 ENCOUNTER — PATIENT MESSAGE (OUTPATIENT)
Dept: ADMINISTRATIVE | Facility: HOSPITAL | Age: 64
End: 2024-10-09
Payer: MEDICARE

## 2024-10-25 ENCOUNTER — PATIENT MESSAGE (OUTPATIENT)
Dept: ADMINISTRATIVE | Facility: HOSPITAL | Age: 64
End: 2024-10-25
Payer: MEDICARE

## 2024-11-19 DIAGNOSIS — I10 PRIMARY HYPERTENSION: ICD-10-CM

## 2024-11-19 RX ORDER — CLONIDINE HYDROCHLORIDE 0.1 MG/1
0.1 TABLET ORAL 2 TIMES DAILY
Qty: 60 TABLET | Refills: 0 | Status: SHIPPED | OUTPATIENT
Start: 2024-11-19

## 2024-11-19 RX ORDER — FUROSEMIDE 20 MG/1
20 TABLET ORAL 2 TIMES DAILY
Qty: 180 TABLET | Refills: 0 | Status: SHIPPED | OUTPATIENT
Start: 2024-11-19

## 2024-11-19 NOTE — TELEPHONE ENCOUNTER
Care Due:                  Date            Visit Type   Department     Provider  --------------------------------------------------------------------------------                                [facility] FAMILY  Last Visit: 01-      FOLLOW UP    MEDICINE       Graeme Mena  Next Visit: None Scheduled  None         None Found                                                            Last  Test          Frequency    Reason                     Performed    Due Date  --------------------------------------------------------------------------------    CMP.........  12 months..  furosemide, insulin,       01- 01-                             metFORMIN................    HBA1C.......  6 months...  insulin, metFORMIN.......  01-   07-    [facility] Embedded Care Due Messages. Reference number: [id number].   11/19/2024 9:28:41 AM CST

## 2024-11-19 NOTE — TELEPHONE ENCOUNTER
Refill Routing Note   Medication(s) are not appropriate for processing by [facility] for the following reason(s):        Required labs outdated  Required vitals abnormal  Outside of protocol    [facility] action(s):  Defer  Route               Appointments  past 12m or future 3m with PCP    Date Provider   Last Visit   1/11/2024 Graeme Mena MD   Next Visit   Visit date not found Graeme Mena MD   ED visits in past 90 days: 0        Note composed:1:15 PM 11/19/2024

## 2024-11-25 ENCOUNTER — PATIENT MESSAGE (OUTPATIENT)
Dept: ADMINISTRATIVE | Facility: HOSPITAL | Age: 64
End: 2024-11-25
Payer: MEDICARE

## 2024-12-12 DIAGNOSIS — E11.40 TYPE 2 DIABETES MELLITUS WITH DIABETIC NEUROPATHY, WITH LONG-TERM CURRENT USE OF INSULIN: ICD-10-CM

## 2024-12-12 DIAGNOSIS — Z79.4 TYPE 2 DIABETES MELLITUS WITH DIABETIC NEUROPATHY, WITH LONG-TERM CURRENT USE OF INSULIN: ICD-10-CM

## 2024-12-12 RX ORDER — BLOOD-GLUCOSE METER
EACH MISCELLANEOUS
Qty: 400 STRIP | Refills: 3 | Status: SHIPPED | OUTPATIENT
Start: 2024-12-12

## 2024-12-12 NOTE — TELEPHONE ENCOUNTER
No care due was identified.  [facility] Embedded Care Due Messages. Reference number: [id number].   12/12/2024 8:32:52 AM CST

## 2024-12-12 NOTE — TELEPHONE ENCOUNTER
Refill Decision Note   Matthieu Jovel  is requesting a refill authorization.  Brief Assessment and Rationale for Refill:  Approve     Medication Therapy Plan:         Comments:     Note composed:1:00 PM 12/12/2024

## 2025-01-08 ENCOUNTER — PATIENT MESSAGE (OUTPATIENT)
Dept: ADMINISTRATIVE | Facility: HOSPITAL | Age: 65
End: 2025-01-08
Payer: MEDICARE

## 2025-01-10 ENCOUNTER — TELEPHONE (OUTPATIENT)
Dept: FAMILY MEDICINE | Facility: CLINIC | Age: 65
End: 2025-01-10
Payer: MEDICARE

## 2025-01-10 ENCOUNTER — TELEPHONE (OUTPATIENT)
Dept: ENDOCRINOLOGY | Facility: CLINIC | Age: 65
End: 2025-01-10
Payer: MEDICARE

## 2025-01-10 NOTE — TELEPHONE ENCOUNTER
S/w sister and she wanted Danyell to order an A1C for his upcoming eye surgery. Notified sister pt has not seen Danyell in 2 years and in order for her to order one he needs an appt. Advised her to call his PCP(according to sister diabetes is being manage by PCP)and he can order one. Sister v/a

## 2025-01-10 NOTE — TELEPHONE ENCOUNTER
----- Message from Isaak sent at 1/10/2025  2:25 PM CST -----  Regarding: Sooner Appt  Type:  Sooner Appointment Request    Caller is requesting a sooner appointment.  Caller declined first available appointment listed below.  Caller will not accept being placed on the waitlist and is requesting a message be sent to doctor.    Name of Caller:isabel maria dolores    When is the first available appointment?1/15    Symptoms:checkup before sx    Would the patient rather a call back or a response via [facility]? Call back    Best Call Back Number:[phone]      Additional Information: Sts they have to get him in on the 14th.   Please advise -- Thank you

## 2025-01-10 NOTE — TELEPHONE ENCOUNTER
----- Message from Kya sent at 1/10/2025 11:12 AM CST -----  Contact: self  Type:  Sooner Appointment Request    Caller is requesting a sooner appointment.  Caller declined first available appointment listed below.  Caller will not accept being placed on the waitlist and is requesting a message be sent to doctor.    Name of Caller:  pt sister brooks  When is the first available appointment?  N/a  Symptoms:  needs medical clearance for eye surgery and needs A1c checked  Would the patient rather a call back or a response via [facility]? call  Best Call Back Number:  [phone]   Additional Information:  please call

## 2025-01-14 DIAGNOSIS — I10 PRIMARY HYPERTENSION: ICD-10-CM

## 2025-01-14 RX ORDER — CLONIDINE HYDROCHLORIDE 0.1 MG/1
0.1 TABLET ORAL 2 TIMES DAILY
Qty: 60 TABLET | Refills: 0 | Status: SHIPPED | OUTPATIENT
Start: 2025-01-14

## 2025-01-14 NOTE — TELEPHONE ENCOUNTER
Care Due:                  Date            Visit Type   Department     Provider  --------------------------------------------------------------------------------                                [facility]  Last Visit: 01-      FOLLOW UP    MEDICINE       Graeme Mena  Next Visit: None Scheduled  None         None Found                                                            Last  Test          Frequency    Reason                     Performed    Due Date  --------------------------------------------------------------------------------    Office Visit  15 months..  furosemide, insulin,       01- 04-                             metFORMIN, pantoprazole..    Health [facility] Embedded Care Due Messages. Reference number: [id number].   1/14/2025 8:35:17 AM CST

## 2025-01-15 DIAGNOSIS — E11.9 TYPE 2 DIABETES MELLITUS WITHOUT COMPLICATION: ICD-10-CM

## 2025-01-29 DIAGNOSIS — E11.9 TYPE 2 DIABETES MELLITUS WITHOUT COMPLICATION: ICD-10-CM

## 2025-01-30 ENCOUNTER — TELEPHONE (OUTPATIENT)
Dept: FAMILY MEDICINE | Facility: CLINIC | Age: 65
End: 2025-01-30
Payer: MEDICARE

## 2025-01-30 NOTE — TELEPHONE ENCOUNTER
----- Message from Priscila sent at 1/30/2025 12:20 PM CST -----  Contact: Adele  Type: Needs Medical Advice    Who Called: Adele/ Perry   Best Call Back Number: [phone]  Additional  Information: Calling  to report  patient having elevated  blood sugar readings 406. Did take sliding scale Insulin as provider has it ordered.  Please Advise- Thank you

## 2025-01-30 NOTE — TELEPHONE ENCOUNTER
Returned call to JADEN Heredia nurse. Advised per Dr. Mena if patient has had insulin and BS doesn't go under 400 within 30 minutes to go to the ED. Verbalized understanding.

## 2025-02-13 ENCOUNTER — TELEPHONE (OUTPATIENT)
Dept: FAMILY MEDICINE | Facility: CLINIC | Age: 65
End: 2025-02-13
Payer: MEDICARE

## 2025-02-13 DIAGNOSIS — Z79.4 TYPE 2 DIABETES MELLITUS WITH DIABETIC NEUROPATHY, WITH LONG-TERM CURRENT USE OF INSULIN: ICD-10-CM

## 2025-02-13 DIAGNOSIS — E11.40 TYPE 2 DIABETES MELLITUS WITH DIABETIC NEUROPATHY, WITH LONG-TERM CURRENT USE OF INSULIN: ICD-10-CM

## 2025-02-13 RX ORDER — GABAPENTIN 100 MG/1
100 CAPSULE ORAL 3 TIMES DAILY
Qty: 90 CAPSULE | Refills: 11 | OUTPATIENT
Start: 2025-02-13

## 2025-02-13 NOTE — TELEPHONE ENCOUNTER
----- Message from Nicholas sent at 2/13/2025  3:48 PM CST -----  Contact: Ita [phone]  Type:  Patient Returning Call    Who Called: Ita pt's caretaker  Who Left Message for Patient:Kallie Aidarrendavid  Does the patient know what this is regarding?:Yes  Would the patient rather a call back or a response via [facility]? Call  Best Call Back Number:[phone]   Additional Information: Pt is returning call. Thank you.

## 2025-02-13 NOTE — TELEPHONE ENCOUNTER
----- Message from Kya sent at 2/13/2025 12:18 PM CST -----  Contact: [facility]  Type:  RX Refill Request    Who Called:  [facility]  Refill or New Rx:  refill  RX Name and Strength:  gabapentin (NEURONTIN) 100 MG capsule  How is the patient currently taking it? (ex. 1XDay):  as directed  Is this a 30 day or 90 day RX:  90  Preferred Pharmacy with phone number:    [facility] [address], [place] [address]  [address] [facility] [address] [address]  Phone: [phone] Fax: [phone]     Local or Mail Order:  local  Ordering Provider:  mil Gates Call Back Number:[phone]  Additional Information:  please call

## 2025-02-13 NOTE — TELEPHONE ENCOUNTER
No care due was identified.  [facility] Embedded Care Due Messages. Reference number: [id number].   2/13/2025 1:30:19 PM CST

## 2025-02-18 ENCOUNTER — TELEPHONE (OUTPATIENT)
Dept: OTOLARYNGOLOGY | Facility: CLINIC | Age: 65
End: 2025-02-18
Payer: MEDICARE

## 2025-02-18 NOTE — TELEPHONE ENCOUNTER
Returned call to pt, his sister requesting a call back later. I advised her that she can call back at her convenience and any  can schedule appts for pt.

## 2025-02-18 NOTE — TELEPHONE ENCOUNTER
----- Message from Sami sent at 2/18/2025 11:07 AM CST -----  Contact: Pt  .Type:  Sooner Apoointment RequestCaller is requesting a sooner appointment.  Caller declined first available appointment listed below.  Caller will not accept being placed on the waitlist and is requesting a message be sent to doctor.Name of Caller:ptWhen is the first available appointment?AprilSymptoms: hearing loss and painWould the patient rather a call back or a response via [facility]?  Call [facility] Call Back Number: [phone] Information:

## 2025-02-20 DIAGNOSIS — E11.40 TYPE 2 DIABETES MELLITUS WITH DIABETIC NEUROPATHY, WITH LONG-TERM CURRENT USE OF INSULIN: ICD-10-CM

## 2025-02-20 DIAGNOSIS — Z79.4 TYPE 2 DIABETES MELLITUS WITH DIABETIC NEUROPATHY, WITH LONG-TERM CURRENT USE OF INSULIN: ICD-10-CM

## 2025-02-20 NOTE — TELEPHONE ENCOUNTER
No care due was identified.  Health [facility] Embedded Care Due Messages. Reference number: [id number].   2/20/2025 5:41:11 PM CST

## 2025-02-20 NOTE — TELEPHONE ENCOUNTER
Pt.'s sister states they were late due to her having to drive 2 hours and then getting to get to appt. With PAULO Gayle.   Informed her that provider was unable to see pt. Today but that they could request refills for new glucometer test strips via portal.  She vu.

## 2025-02-27 RX ORDER — GABAPENTIN 100 MG/1
CAPSULE ORAL
Qty: 42 CAPSULE | Refills: 0 | Status: SHIPPED | OUTPATIENT
Start: 2025-02-27 | End: 2025-03-27

## 2025-02-28 ENCOUNTER — TELEPHONE (OUTPATIENT)
Dept: FAMILY MEDICINE | Facility: CLINIC | Age: 65
End: 2025-02-28
Payer: MEDICARE

## 2025-02-28 NOTE — TELEPHONE ENCOUNTER
----- Message from Nicholas sent at 2/28/2025 12:14 PM CST -----  Contact: Giovanna [phone]  Type:  Needs Medical AdviceWho Called: Giovanna linda/ [facility] the patient rather a call back or a response [facility]? No call back necessaryBest Call Back Number: [phone] Information: Pt has completed HH episode and has been discharged from .

## 2025-03-10 ENCOUNTER — PATIENT MESSAGE (OUTPATIENT)
Dept: ADMINISTRATIVE | Facility: HOSPITAL | Age: 65
End: 2025-03-10
Payer: MEDICARE

## 2025-03-12 ENCOUNTER — OFFICE VISIT (OUTPATIENT)
Dept: OTOLARYNGOLOGY | Facility: CLINIC | Age: 65
End: 2025-03-12
Payer: MEDICARE

## 2025-03-12 VITALS — DIASTOLIC BLOOD PRESSURE: 72 MMHG | SYSTOLIC BLOOD PRESSURE: 160 MMHG | HEART RATE: 83 BPM

## 2025-03-12 DIAGNOSIS — H93.12 TINNITUS, LEFT: ICD-10-CM

## 2025-03-12 DIAGNOSIS — H91.90 SUBJECTIVE HEARING LOSS: ICD-10-CM

## 2025-03-12 DIAGNOSIS — H60.319 ACUTE DIFFUSE OTITIS EXTERNA, UNSPECIFIED LATERALITY: ICD-10-CM

## 2025-03-12 DIAGNOSIS — T16.1XXA EAR FOREIGN BODY, RIGHT, INITIAL ENCOUNTER: Primary | ICD-10-CM

## 2025-03-12 PROCEDURE — 99999 PR PBB SHADOW E&M-EST. PATIENT-LVL III: CPT | Mod: PBBFAC,,, | Performed by: STUDENT IN AN ORGANIZED HEALTH CARE EDUCATION/TRAINING PROGRAM

## 2025-03-12 RX ORDER — FLUTICASONE PROPIONATE 50 MCG
2 SPRAY, SUSPENSION (ML) NASAL DAILY PRN
COMMUNITY
Start: 2025-02-04

## 2025-03-12 RX ORDER — PREDNISONE 20 MG/1
20 TABLET ORAL
COMMUNITY
Start: 2025-02-04

## 2025-03-12 RX ORDER — CYCLOBENZAPRINE HCL 10 MG
1 TABLET ORAL NIGHTLY
COMMUNITY

## 2025-03-12 RX ORDER — CIPROFLOXACIN AND DEXAMETHASONE 3; 1 MG/ML; MG/ML
4 SUSPENSION/ DROPS AURICULAR (OTIC) 2 TIMES DAILY
Qty: 7.5 ML | Refills: 3 | Status: SHIPPED | OUTPATIENT
Start: 2025-03-12 | End: 2025-03-19

## 2025-03-12 RX ORDER — DOCUSATE SODIUM 100 MG/1
1 CAPSULE, LIQUID FILLED ORAL 2 TIMES DAILY
COMMUNITY

## 2025-03-12 RX ORDER — LORATADINE 10 MG/1
10 TABLET ORAL
COMMUNITY
Start: 2025-02-12

## 2025-03-12 RX ORDER — ALBUTEROL SULFATE 90 UG/1
2 INHALANT RESPIRATORY (INHALATION) EVERY 6 HOURS PRN
COMMUNITY
Start: 2025-01-29

## 2025-03-12 NOTE — PROGRESS NOTES
Otolaryngology Clinic Note    Subjective:       Patient ID: Matthieu Jovel is a 65 y.o. male.    Chief Complaint: Ear Fullness, Dizziness, and Tinnitus (Left ear)      History of Present Illness: Matthieu Jovel is a 65 y.o. male presenting with ear pain and fullness.   Had PNA end of Jan, complained about ear, was told he needed to go to ear doctor. The pain is gone, is stopped up, throbbing, hears loud noise in ocean sound. Affecting hearing. Reports got abx, something to open ear and debrox. He can pop right but not left.     He does report he has been using earplugs due to snoring of room mates.       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 6/12/2023    Anemia, chronic disease 6/12/2023    Anemia, chronic renal failure, stage 3 (moderate) 6/12/2023    Development disorder, mixed     Diabetes mellitus type II     Mental and behavioral problems with communication (including speech)      Social Drivers of Health     Tobacco Use: Medium Risk (1/11/2024)    Patient History     Smoking Tobacco Use: Former     Smokeless Tobacco Use: Never     Passive Exposure: Not on file   Alcohol Use: Not At Risk (3/11/2025)    AUDIT-C     Frequency of Alcohol Consumption: Never     Average Number of Drinks: Patient does not drink     Frequency of Binge Drinking: Never   Financial Resource Strain: Low Risk  (3/11/2025)    Overall Financial Resource Strain (CARDIA)     Difficulty of Paying Living Expenses: Not very hard   Food Insecurity: No Food Insecurity (3/11/2025)    Hunger Vital Sign     Worried About Running Out of Food in the Last Year: Never true     Ran Out of Food in the Last Year: Never true   Transportation Needs: No Transportation Needs (3/11/2025)    PRAPARE - Transportation     Lack of Transportation (Medical): No     Lack of Transportation (Non-Medical): No   Physical Activity: Insufficiently Active  "(3/11/2025)    Exercise Vital Sign     Days of Exercise per Week: 3 days     Minutes of Exercise per Session: 10 min   Stress: Stress Concern Present (3/11/2025)    [employer] - Occupational Stress Questionnaire     Feeling of Stress : Very much   Housing Stability: Low Risk  (3/11/2025)    Housing Stability Vital Sign     Unable to Pay for Housing in the Last Year: No     Number of Times Moved in the Last Year: 0     Homeless in the Last Year: No   Depression: Not at risk (2/12/2025)    [facility]    PHQ-2     PHQ-2 Score: 0   Utilities: Not At Risk (3/11/2025)    [facility] Utilities     Threatened with loss of utilities: No   Health Literacy: Inadequate Health Literacy (3/11/2025)     Health Literacy     Frequency of need for help with medical instructions: Often   Social Isolation: Not on file     Review of patient's allergies indicates:   Allergen Reactions    Codeine     Morphine sulfate Other (See Comments)     Other reaction(s): Unknown     Current Outpatient Medications   Medication Instructions    albuterol (PROVENTIL/VENTOLIN HFA) 90 mcg/actuation inhaler 2 puffs, Every 6 hours PRN    busPIRone (BUSPAR) 10 mg, 3 times daily    ciprofloxacin-dexAMETHasone 0.3-0.1% (CIPRODEX) 0.3-0.1 % DrpS 4 drops, Left Ear, 2 times daily, As needed for drainage    cloNIDine (CATAPRES) 0.1 mg, Oral, 2 times daily    COMFORT EZ PEN NEEDLES 31 gauge x 3/16" Ndle INJECT 1 pen needle UNDER THE SKIN FOUR TIMES DAILY    cyclobenzaprine (FLEXERIL) 10 MG tablet 1 tablet, Oral, Nightly    divalproex (DEPAKOTE) 125 mg, Oral, Nightly    docusate sodium (COLACE) 100 MG capsule 1 capsule, Oral, 2 times daily    fluconazole (DIFLUCAN) 200 mg, Daily    FLUoxetine 40 mg, Daily    fluticasone propionate (FLONASE) 50 mcg/actuation nasal spray 2 sprays, Daily PRN    furosemide (LASIX) 20 mg, Oral, 2 times daily    gabapentin (NEURONTIN) 100 MG capsule Take 1 capsule (100 mg total) by " mouth 2 (two) times daily for 14 days, THEN 1 capsule (100 mg total) once daily for 14 days. Medication taper - will need appointment for more refills.    hydrOXYzine pamoate (VISTARIL) 25 mg, 3 times daily PRN    insulin glargine U-100 (Lantus) (BASAGLAR KWIKPEN U-100 INSULIN) 26 Units, Subcutaneous, Daily    insulin lispro (HUMALOG KWIKPEN INSULIN) 100 unit/mL pen INJECT EIGHT UNITS INTO THE SKIN THREE TIMES DAILY WITH MEALS. PLUS CORRECTION SCALE, MAX TOTAL DAILY DOSE 60 UNITS    loratadine (CLARITIN) 10 mg    metFORMIN (GLUCOPHAGE-XR) 500 mg, Oral, 2 times daily with meals    mirtazapine (REMERON) 30 mg, Nightly    ONETOUCH VERIO FLEX METER Misc USE AS DIRECTED    ONETOUCH VERIO TEST STRIPS Strp USE TO CHECK BLOOD GLUCOSE FOUR TIMES DAILY, TO USE WITH METER    pantoprazole (PROTONIX) 40 mg, Oral, Daily    predniSONE (DELTASONE) 20 mg    QUEtiapine (SEROQUEL) 50 mg, Daily    traZODone (DESYREL) 300 mg, Nightly    TRUEPLUS LANCETS 33 gauge Misc USE 1 lancet TO check blood sugar FOUR TIMES DAILY         ENT ROS negative except as stated above.     Patient answers are not available for this visit.            Objective:      Vitals:    03/12/25 1339   BP: (!) 160/72   Pulse: 83       General: NAD, well appearing, syndromic  Eyes: Normal conjunctiva and lids  Face: symmetric, nerve intact  Nose: The nose is without any evidence of any deformity. The nasal mucosa is moist. The septum is midline. There is no evidence of septal hematoma. The turbinates are without abnormality.   Ears: The ears are with normal-appearing pinna. Debris overlying foreign body on left. Canal edematous and wet underlying. TM normal. Hearing is grossly intact. Right ear normal.   Mouth: No obvious abnormalities to the lips. The teeth are unremarkable. The gingivae are without any obvious evidence of infection or lesion. The oral mucosa is moist and pink. There are no obvious masses to the hard or soft palate.   Oropharynx: The uvula is midline.   The tongue is midline. The posterior pharynx is without erythema or exudate. The tonsils are normal appearing.  Salivary glands: The salivary glands are symmetric and not enlarged, no masses  Neck: No lymphadenopathy, trachea midline, thryoid not enlarged.  Psych: Normal mood and affect.   Neuro: Grossly intact  Speech: fluent    Procedure:   Ear foreign body removal  Indications: foreign body left ear  Verbal consent obtained.   Under microscope, wax, debris, and waxy foreign body plugging deep ear canal was removed from left ear using combination of suction, hook, curette instrumentation.   Patient tolerated procedure well.       Test Review: I personally reviewed CT  EXAMINATION:  CT HEAD WITHOUT CONTRAST     CLINICAL HISTORY:  Seizure     TECHNIQUE:  Axial CT images were obtained through the head without contrast.  Coronal and sagittal reformations were also performed.  Total .  Automated exposure control utilized.     COMPARISON:  06/11/2023     FINDINGS:  No hemorrhage, mass effect or CT evidence of acute cortical infarction.  White-matter hypodensities are nonspecific but likely related to small vessel ischemic disease.  Ventricles and sulci are proportionate.     No abnormal extra-axial fluid collections.     No hyperdense artery or vein.  Intracranial arteries are partially calcified.     No skull fracture or aggressive osseous process.  Mastoids are clear.  Mild mucosal thickening is in the left maxillary sinus.     Impression:     No acute intracranial findings.     Findings are compatible with the preliminary report.        Electronically signed by:Lawrence Simon MD  Date:                                            06/16/2023     Assessment and Plan:       1. Ear foreign body, right, initial encounter    2. Tinnitus, left    3. Subjective hearing loss    4. Acute diffuse otitis externa, unspecified laterality        FB removed on left.  Ear canal inflamed and wet. Will tx with ciprodex 1 week.  Some tinnitus/pain should resolve with this.     RTC: PRN    Plan of care was discussed in detail with the patient, who agreed with the plan as above. All questions were answered in detail.     Rosa Patrick MD  Otolaryngology

## 2025-03-18 DIAGNOSIS — E11.40 TYPE 2 DIABETES MELLITUS WITH DIABETIC NEUROPATHY, WITH LONG-TERM CURRENT USE OF INSULIN: ICD-10-CM

## 2025-03-18 DIAGNOSIS — Z79.4 TYPE 2 DIABETES MELLITUS WITH DIABETIC NEUROPATHY, WITH LONG-TERM CURRENT USE OF INSULIN: ICD-10-CM

## 2025-03-18 NOTE — TELEPHONE ENCOUNTER
Care Due:                  Date            Visit Type   Department     Provider  --------------------------------------------------------------------------------                                [facility]ILY  Last Visit: 01-      FOLLOW UP    MEDICINE       Graeme Mena                               -                              [facility]  Next Visit: 06-      CARE ([facility])   MEDICINE       Graeme Mena                                                            Last  Test          Frequency    Reason                     Performed    Due Date  --------------------------------------------------------------------------------    Office Visit  15 months..  furosemide, insulin,       01- 04-                             metFORMIN, pantoprazole..    [facility] Embedded Care Due Messages. Reference number: [id number].   3/18/2025 1:14:00 PM CDT

## 2025-03-18 NOTE — TELEPHONE ENCOUNTER
No care due was identified.  [facility] Embedded Care Due Messages. Reference number: [id number].   3/18/2025 1:31:03 PM CDT

## 2025-03-18 NOTE — TELEPHONE ENCOUNTER
----- Message from Genesis sent at 3/18/2025 12:10 PM CDT -----  Type:  Needs Medical AdviceWho Called: pt care takerPharmacy name and phone #:  [facility] - [place], [place] - [address] [facility] [id number] [facility] [address] [address] [address]Rsgeo: [phone] Fax: [phone] the patient rather a call back or a response via [facility]? Please call no [facility] Call Back Number: [phone] Information: pt has appt on 06/26 @ 130 but needs test strips filled before this appt.l  Please call into pharmacy   Please call back to advise. Thanks!

## 2025-03-19 RX ORDER — INSULIN LISPRO 100 [IU]/ML
INJECTION, SOLUTION INTRAVENOUS; SUBCUTANEOUS
Qty: 15 ML | Refills: 11 | OUTPATIENT
Start: 2025-03-19

## 2025-03-19 NOTE — TELEPHONE ENCOUNTER
Refill Routing Note   Medication(s) are not appropriate for processing by [facility] for the following reason(s):        Outside of protocol    [facility] action(s):  Route        Medication Therapy Plan: Sliding scale outside of protocol for [facility]      Appointments  past 12m or future 3m with PCP    Date Provider   Last Visit   1/11/2024 Graeme Mena MD   Next Visit   6/26/2025 Graeme Mena MD   ED visits in past 90 days: 0        Note composed:11:30 PM 03/18/2025

## 2025-03-20 NOTE — TELEPHONE ENCOUNTER
Provider Staff:  Please note Refusal of medication.     Action required for this patient.      Requested Prescriptions     Refused Prescriptions Disp Refills    insulin lispro 100 unit/mL pen [Pharmacy Med Name: insulin lispro (U-100) 100 unit/mL subcutaneous pen] 15 mL 11     Sig: INJECT EIGHT UNITS INTO THE SKIN THREE TIMES DAILY WITH MEALS. PLUS CORRECTION SCALE, MAX TOTAL DAILY DOSE 60 UNITS     Refused By: ALEX AUGUSTIN     Reason for Refusal: Patient needs an appointment      Thanks!  [facility]   Note composed: 03/19/2025 11:57 PM

## 2025-08-04 NOTE — PATIENT INSTRUCTIONS
Script fell off. Patient has not been seen since 6/9/2023  Task sent to ARUNA Yeboah to call patient and schedule apt and let patient know short script will be sent till patient seen again.    You must understand that you've received an Urgent Care treatment only and that you may be released before all your medical problems are known or treated. You, the patient, will arrange for follow up care as instructed.  Follow up with your PCP or specialty clinic as directed in the next 1-2 weeks if not improved or as needed.  You can call [phone] to schedule an appointment with the appropriate provider.  If your condition worsens we recommend that you receive another evaluation at the emergency room immediately or contact your primary medical clinics after hours call service to discuss your concerns.  Please return here or go to the Emergency Department for any concerns or worsening of condition.

## 2025-08-19 ENCOUNTER — PATIENT MESSAGE (OUTPATIENT)
Dept: ADMINISTRATIVE | Facility: HOSPITAL | Age: 65
End: 2025-08-19
Payer: MEDICARE